# Patient Record
Sex: FEMALE | Race: WHITE | Employment: OTHER | URBAN - METROPOLITAN AREA
[De-identification: names, ages, dates, MRNs, and addresses within clinical notes are randomized per-mention and may not be internally consistent; named-entity substitution may affect disease eponyms.]

---

## 2018-03-05 PROBLEM — R42 VERTIGO: Status: ACTIVE | Noted: 2018-03-05

## 2018-03-05 PROBLEM — H91.93 BILATERAL HEARING LOSS: Status: ACTIVE | Noted: 2018-03-05

## 2018-03-05 PROBLEM — I10 ESSENTIAL HYPERTENSION: Status: ACTIVE | Noted: 2018-03-05

## 2018-03-05 PROBLEM — E11.9 TYPE 2 DIABETES MELLITUS WITHOUT COMPLICATION, WITHOUT LONG-TERM CURRENT USE OF INSULIN (HCC): Status: ACTIVE | Noted: 2018-03-05

## 2018-04-23 ENCOUNTER — HOSPITAL ENCOUNTER (OUTPATIENT)
Dept: CT IMAGING | Age: 83
Discharge: HOME | End: 2018-04-23
Attending: INTERNAL MEDICINE
Payer: MEDICARE

## 2018-04-23 DIAGNOSIS — G44.89 OTHER HEADACHE SYNDROME: ICD-10-CM

## 2018-04-23 DIAGNOSIS — Z91.81 HISTORY OF FALL: ICD-10-CM

## 2018-04-23 DIAGNOSIS — I10 ESSENTIAL HYPERTENSION: ICD-10-CM

## 2018-04-23 PROCEDURE — 70450 CT HEAD/BRAIN W/O DYE: CPT

## 2018-07-09 ENCOUNTER — HOSPITAL ENCOUNTER (OUTPATIENT)
Dept: PHYSICAL THERAPY | Age: 83
Discharge: HOME | End: 2018-07-09
Attending: INTERNAL MEDICINE
Payer: MEDICARE

## 2018-07-09 DIAGNOSIS — R42 VERTIGO: ICD-10-CM

## 2018-07-09 PROCEDURE — 97163 PT EVAL HIGH COMPLEX 45 MIN: CPT

## 2018-07-09 PROCEDURE — G8979 MOBILITY GOAL STATUS: HCPCS

## 2018-07-09 PROCEDURE — 97112 NEUROMUSCULAR REEDUCATION: CPT

## 2018-07-09 PROCEDURE — G8978 MOBILITY CURRENT STATUS: HCPCS

## 2018-07-09 NOTE — THERAPY EVALUATION
Chace Zhao  : 1935  [place]  Secondary: [place] [employer] [address] at [place] [id number] [facility], [address] [place] [address],[address], [facility] [address].  [phone]   [phone]        OUTPATIENT PHYSICAL THERAPY:Initial Assessment 2018   ICD-10: Treatment Diagnosis: Difficulty in walking, not elsewhere classified (R26.2)  Precautions/Allergies:   Review of patient's allergies indicates no known allergies. Fall Risk Score: 2 (? 5 = High Risk)  MD Orders: Evaluate and treat  MEDICAL/REFERRING DIAGNOSIS:  Vertigo [R42]   DATE OF ONSET: May 2017   REFERRING PHYSICIAN: Maxwell Eubanks MD  RETURN PHYSICIAN APPOINTMENT: Unknown      INITIAL ASSESSMENT:  Ms. Bell presents with vertigo, imbalance, and difficulty walking. Patient is at higher fall risk based on scores received on Loyd Balance Scale and Dynamic Gait Index. Unable to determine exact origins of vertigo. Patient is a poor historian. Patient could be using the term vertigo to describe her imbalance. Will continue to assess as treatment progresses. Patient would benefit from skilled physical therapy to address problems and goals. Thank you for this referral.      PROBLEM LIST (Impacting functional limitations):  1. Decreased ADL/Functional Activities  2. Decreased Ambulation Ability/Technique  3. Decreased Balance  4. Decreased Activity Tolerance  5. Decreased Cognition  6. Increased vertigo INTERVENTIONS PLANNED:  1. Balance Exercise  2. Gait Training  3. Home Exercise Program (HEP)  4. Therapeutic Activites  5. Habituation exercises   TREATMENT PLAN:  Effective Dates: 2018 TO 10/7/2018 (90 days). Frequency/Duration: 1 time a week for 90 Days  GOALS: (Goals have been discussed and agreed upon with patient.)  Short-Term Functional Goals: Time Frame: 30 days   1. Patient will be independent with home exercise program to improve balance and decrease fall risk with daily activities. 2. Patient will increase score on Loyd Balance Scale to greater than or equal to 33/56 demonstrating improved balance and decreased fall risk with daily activities. Discharge Goals: Time Frame: 90 days   1. Patient will increase score on Loyd Balance Scale to greater than or equal to 40/56 demonstrating improved balance and decreased fall risk with daily activities. 2. Patient will increase score on Dynamic Gait Index to greater than or equal to 10/24 demonstrating improved balance and decreased fall risk with daily activities. 3. Patient will report improved stability with daily activities. Rehabilitation Potential For Stated Goals: Good  Regarding Lida Zhao's therapy, I certify that the treatment plan above will be carried out by a therapist or under their direction. Thank you for this referral,  Loi Stone PT     Referring Physician Signature: Gissell Viera MD              Date                    The information in this section was collected on 7/9/2018 (except where otherwise noted). HISTORY:   History of Present Injury/Illness (Reason for Referral):  Patient reports vertigo began after she had her cochlear implants in May 2017. Patient unable to describe vertigo. Patient reports vertigo is constant. Patient reports her head feels heavy. Patient rates current dizziness as 0/10; and neck pain as 5/10. Patient uses a rollator walker for ambulation. Patient has had no falls over the past couple of months. Patient did have a fall in January 2018. Patient reports it was raining and she slipped outside and hit her head. Patient reports she cannot walk in the dark due to imbalance. Patient has a history of inner ear infections. Past Medical History/Comorbidities:   Ms. Becky Farrar  has a past medical history of Broken bones; Diabetes ([address] Ear problems; Hearing problem; History of mammogram (2015); History of Papanicolaou smear of cervix (>5 years ago); and Hypertension.   Ms. Becky Farrar has a past surgical history that includes hx orthopaedic (2008); hx colonoscopy (2008); hx cochlear implant (02/21/2017); and hx tubal ligation. Social History/Living Environment:     Lives with her daughter in a two story home. Patient lives on the first floor. Prior Level of Function/Work/Activity:  Patient needs family for transportation. Retired. Dominant Side:         RIGHT  Personal Factors:          Sex:  female        Age:  80 y.o. Current Medications:       Current Outpatient Prescriptions:     amLODIPine (NORVASC) 5 mg tablet, Take 2 Tabs by mouth daily for 90 days. Take 1 tablet by mouth once in the morning and once at night., Disp: 180 Tab, Rfl: 1    lisinopril (PRINIVIL, ZESTRIL) 5 mg tablet, Take 1 Tab by mouth daily for 90 days. , Disp: 90 Tab, Rfl: 1    aspirin delayed-release 81 mg tablet, Take  by mouth daily. , Disp: , Rfl:     multivitamin (ONE A DAY) tablet, Take 1 Tab by mouth daily. , Disp: , Rfl:    Date Last Reviewed:  7/9/2018   Number of Personal Factors/Comorbidities that affect the Plan of Care: 3+: HIGH COMPLEXITY   EXAMINATION:   Observation/Orthostatic Postural Assessment: Forward head/rounded shoulders posture   Functional Mobility:         Gait/Ambulation:  Patient ambulates without assistive device demonstrating decreased gait speed with wider base of support to improve stability. Sensation:         Decreased bilateral feet. Postural Control & Balance:  · Loyd Balance Scale:  29/56.   (A score less than 45/56 indicates high risk of falls)     · Dynamic Gait Index:  6/24.   (A score less than or equal to19/24 is abnormal and predictive of falls)     · Contentful Sensory Organization Test:  Not tested.    Oculomotor Exam:  · Eye Range of Motion:  full range of motion  · Cervical Range of Motion:   full range of motion  · Spontaneous nystagmus:  NO  · Gaze holding nystagmus:  NO  · Smooth Pursuit:      []Smooth Eye Movements    []Delayed Eye Movements [x]Within Normal Limits     []Other(comment): · Voluntary Saccades:      []Smooth Eye Movements    []Delayed Eye Movements     [x]Within Normal Limits     []Other(comment):   Vestibular Ocular Reflex Testing:  · Dynamic Visual Acuity Test:  Not tested. · Head Thrust Test: Attempted to test, but patient unable to keep her eyes open for the test.  Position Tests:  · Hallpike-Crowley testing presented as  indicating that Benign Paroxysmal Positional Vertigo (BPPV) is on the . (NOT TESTED. WILL TEST AT LATER TIME)       Body Structures Involved:  1. Nerves  2. Eyes and Ears  3. Muscles Body Functions Affected:  1. Neuromusculoskeletal Activities and Participation Affected:  1. General Tasks and Demands  2. Communication  3. Interpersonal Interactions and Relationships  4. Community, Social and [place]   Number of elements (examined above) that affect the Plan of Care: 4+: HIGH COMPLEXITY   CLINICAL PRESENTATION:   Presentation: Evolving clinical presentation with unstable and unpredictable characteristics: HIGH COMPLEXITY   CLINICAL DECISION MAKING:   Outcome Measure: Tool Used: Loyd Balance Scale  Score:  Initial: 29/56 Most Recent: X/56 (Date: -- )   Interpretation of Score: Each section is scored on a 0-4 scale, 0 representing the patients inability to perform the task and 4 representing independence. The scores of each section are added together for a total score of 56. The higher the patients score, the more independent the patient is. Any score below 45 indicates increased risk for falls. Score 56 55-45 44-34 33-23 22-12 11-1 0   Modifier CH CI CJ CK CL CM CN     ?  Mobility - Walking and Moving Around:     - CURRENT STATUS: CK - 40%-59% impaired, limited or restricted    - GOAL STATUS: CJ - 20%-39% impaired, limited or restricted    - D/C STATUS:  ---------------To be determined---------------    Medical Necessity:   · Patient is expected to demonstrate progress in balance to improve safety during activities of daily living. Reason for Services/Other Comments:  · Patient continues to require skilled intervention due to higher fall risk with daily activities. Use of outcome tool(s) and clinical judgement create a POC that gives a: Difficult prediction of patient's progress: HIGH COMPLEXITY            TREATMENT:   (In addition to Assessment/Re-Assessment sessions the following treatments were rendered)  Pre-treatment Symptoms/Complaints:  Imbalance, vertigo, and difficulty walking  Pain: Initial:   Pain Intensity 1: 5  Pain Location 1: Neck  Post Session:  5     Initial Evaluation: 45 minutes   NEUROMUSCULAR RE-EDUCATION: (15 minutes):  Exercise/activities per grid below to improve balance. Required moderate visual and verbal cues to promote dynamic balance in standing. Date:  7/9/2018 Date:   Date:     Activity/Exercise Parameters Parameters Parameters   Marching standing in place X 10B     Single side steps X 10B     Feet together Eyes open     Feet apart Eyes closed                       [employer] Portal  Treatment/Session Assessment:    · Response to Treatment:  Tolerated without complaints. · Compliance with Program/Exercises: Will assess as treatment progresses. · Recommendations/Intent for next treatment session: \"Next visit will focus on advancements to more challenging activities\". Will check for BPPV.    Total Treatment Duration:  PT Patient Time In/Time Out  Time In: 0900  Time Out: [address], PT

## 2018-07-09 NOTE — PROGRESS NOTES
Ambulatory/Rehab Services H2 Model Falls Risk Assessment    Risk Factor Pts. ·   Confusion/Disorientation/Impulsivity  []    4 ·   Symptomatic Depression  []   2 ·   Altered Elimination  []   1 ·   Dizziness/Vertigo  [x]   1 ·   Gender (Male)  []   1 ·   Any administered antiepileptics (anticonvulsants):  []   2 ·   Any administered benzodiazepines:  []   1 ·   Visual Impairment (specify):  []   1 ·   Portable Oxygen Use  []   1 ·   Orthostatic ? BP  []   1 ·   History of Recent Falls (within 3 mos.)  []   5     Ability to Rise from Chair (choose one) Pts. ·   Ability to rise in a single movement  []   0 ·   Pushes up, successful in one attempt  [x]   1 ·   Multiple attempts, but successful  []   3 ·   Unable to rise without assistance  []   4   Total: (5 or greater = High Risk) 2     Falls Prevention Plan:   []                Physical Limitations to Exercise (specify):   []                Mobility Assistance Device (type):   []                Exercise/Equipment Adaptation (specify):    ©2010 [facility] [address] Patent #[id number].  Federal Law prohibits the replication, distribution or use without written permission from [facility] [employer]

## 2018-07-16 ENCOUNTER — HOSPITAL ENCOUNTER (OUTPATIENT)
Dept: PHYSICAL THERAPY | Age: 83
Discharge: HOME | End: 2018-07-16
Attending: INTERNAL MEDICINE
Payer: MEDICARE

## 2018-07-16 PROCEDURE — 97112 NEUROMUSCULAR REEDUCATION: CPT

## 2018-07-16 NOTE — PROGRESS NOTES
Manuelito Zhao  : 1935  [place]  Secondary: [place] [employer] [address] at [facility]  [address] [facility], [address] [place] [address] [address], [address] [facility]  [phone]   [phone]        OUTPATIENT PHYSICAL THERAPY:Daily Note 2018   ICD-10: Treatment Diagnosis: Difficulty in walking, not elsewhere classified (R26.2)  Precautions/Allergies:   Review of patient's allergies indicates no known allergies. Fall Risk Score: 2 (? 5 = High Risk)  MD Orders: Evaluate and treat  MEDICAL/REFERRING DIAGNOSIS:  Dizziness and giddiness [R42]   DATE OF ONSET: May 2017   REFERRING PHYSICIAN: Nita Doyle MD  RETURN PHYSICIAN APPOINTMENT: Unknown      INITIAL ASSESSMENT:  Ms. Jake Black presents with vertigo, imbalance, and difficulty walking. Patient is at higher fall risk based on scores received on Loyd Balance Scale and Dynamic Gait Index. Unable to determine exact origins of vertigo. Patient is a poor historian. Patient could be using the term vertigo to describe her imbalance. Will continue to assess as treatment progresses. Patient would benefit from skilled physical therapy to address problems and goals. Thank you for this referral.      PROBLEM LIST (Impacting functional limitations):  1. Decreased ADL/Functional Activities  2. Decreased Ambulation Ability/Technique  3. Decreased Balance  4. Decreased Activity Tolerance  5. Decreased Cognition  6. Increased vertigo INTERVENTIONS PLANNED:  1. Balance Exercise  2. Gait Training  3. Home Exercise Program (HEP)  4. Therapeutic Activites  5. Habituation exercises   TREATMENT PLAN:  Effective Dates: 2018 TO 10/7/2018 (90 days). Frequency/Duration: 1 time a week for 90 Days  GOALS: (Goals have been discussed and agreed upon with patient.)  Short-Term Functional Goals: Time Frame: 30 days   1.  Patient will be independent with home exercise program to improve balance and decrease fall risk with daily activities. 2. Patient will increase score on Loyd Balance Scale to greater than or equal to 33/56 demonstrating improved balance and decreased fall risk with daily activities. Discharge Goals: Time Frame: 90 days   1. Patient will increase score on Loyd Balance Scale to greater than or equal to 40/56 demonstrating improved balance and decreased fall risk with daily activities. 2. Patient will increase score on Dynamic Gait Index to greater than or equal to 10/24 demonstrating improved balance and decreased fall risk with daily activities. 3. Patient will report improved stability with daily activities. Rehabilitation Potential For Stated Goals: Good  Regarding Ingrid Zhao's therapy, I certify that the treatment plan above will be carried out by a therapist or under their direction. Thank you for this referral,  Arthur Mendez PT     Referring Physician Signature: Cleveland Keane MD              Date                    The information in this section was collected on 7/9/2018 (except where otherwise noted). HISTORY:   History of Present Injury/Illness (Reason for Referral):  Patient reports vertigo began after she had her cochlear implants in May 2017. Patient unable to describe vertigo. Patient reports vertigo is constant. Patient reports her head feels heavy. Patient rates current dizziness as 0/10; and neck pain as 5/10. Patient uses a rollator walker for ambulation. Patient has had no falls over the past couple of months. Patient did have a fall in January 2018. Patient reports it was raining and she slipped outside and hit her head. Patient reports she cannot walk in the dark due to imbalance. Patient has a history of inner ear infections. Past Medical History/Comorbidities:   Ms. Constantin Clay  has a past medical history of Broken bones; Diabetes ([address] Ear problems; Hearing problem; History of mammogram (2015); History of Papanicolaou smear of cervix (>5 years ago); and Hypertension. Ms. Little Verdugo  has a past surgical history that includes hx orthopaedic (2008); hx colonoscopy (2008); hx cochlear implant (02/21/2017); and hx tubal ligation. Social History/Living Environment:     Lives with her daughter in a two story home. Patient lives on the first floor. Prior Level of Function/Work/Activity:  Patient needs family for transportation. Retired. Dominant Side:         RIGHT  Personal Factors:          Sex:  female        Age:  80 y.o. Current Medications:       Current Outpatient Prescriptions:     amLODIPine (NORVASC) 5 mg tablet, Take 2 Tabs by mouth daily for 90 days. Take 1 tablet by mouth once in the morning and once at night., Disp: 180 Tab, Rfl: 1    lisinopril (PRINIVIL, ZESTRIL) 5 mg tablet, Take 1 Tab by mouth daily for 90 days. , Disp: 90 Tab, Rfl: 1    aspirin delayed-release 81 mg tablet, Take  by mouth daily. , Disp: , Rfl:     multivitamin (ONE A DAY) tablet, Take 1 Tab by mouth daily. , Disp: , Rfl:    Date Last Reviewed:  7/16/2018   Number of Personal Factors/Comorbidities that affect the Plan of Care: 3+: HIGH COMPLEXITY   EXAMINATION:   Observation/Orthostatic Postural Assessment: Forward head/rounded shoulders posture   Functional Mobility:         Gait/Ambulation:  Patient ambulates without assistive device demonstrating decreased gait speed with wider base of support to improve stability. Sensation:         Decreased bilateral feet. Postural Control & Balance:  · Loyd Balance Scale:  29/56.   (A score less than 45/56 indicates high risk of falls)     · Dynamic Gait Index:  6/24.   (A score less than or equal to19/24 is abnormal and predictive of falls)     · [employer]pt Sensory Organization Test:  Not tested.    Oculomotor Exam:  · Eye Range of Motion:  full range of motion  · Cervical Range of Motion:   full range of motion  · Spontaneous nystagmus:  NO  · Gaze holding nystagmus:  NO  · Smooth Pursuit:      []Smooth Eye Movements    []Delayed Eye Movements     [x]Within Normal Limits     []Other(comment): · Voluntary Saccades:      []Smooth Eye Movements    []Delayed Eye Movements     [x]Within Normal Limits     []Other(comment):   Vestibular Ocular Reflex Testing:  · Dynamic Visual Acuity Test:  Not tested. · Head Thrust Test: Attempted to test, but patient unable to keep her eyes open for the test.  Position Tests:  · Hallpike-Trail testing presented as  indicating that Benign Paroxysmal Positional Vertigo (BPPV) is on the . (NOT TESTED. WILL TEST AT LATER TIME)       Body Structures Involved:  1. Nerves  2. Eyes and Ears  3. Muscles Body Functions Affected:  1. Neuromusculoskeletal Activities and Participation Affected:  1. General Tasks and Demands  2. Communication  3. Interpersonal Interactions and Relationships  4. Community, Social and [place]   Number of elements (examined above) that affect the Plan of Care: 4+: HIGH COMPLEXITY   CLINICAL PRESENTATION:   Presentation: Evolving clinical presentation with unstable and unpredictable characteristics: HIGH COMPLEXITY   CLINICAL DECISION MAKING:   Outcome Measure: Tool Used: Loyd Balance Scale  Score:  Initial: 29/56 Most Recent: X/56 (Date: -- )   Interpretation of Score: Each section is scored on a 0-4 scale, 0 representing the patients inability to perform the task and 4 representing independence. The scores of each section are added together for a total score of 56. The higher the patients score, the more independent the patient is. Any score below 45 indicates increased risk for falls. Score 56 55-45 44-34 33-23 22-12 11-1 0   Modifier CH CI CJ CK CL CM CN     ?  Mobility - Walking and Moving Around:     - CURRENT STATUS: CK - 40%-59% impaired, limited or restricted    - GOAL STATUS: CJ - 20%-39% impaired, limited or restricted    - D/C STATUS:  ---------------To be determined---------------    Medical Necessity:   · Patient is expected to demonstrate progress in balance to improve safety during activities of daily living. Reason for Services/Other Comments:  · Patient continues to require skilled intervention due to higher fall risk with daily activities. Use of outcome tool(s) and clinical judgement create a POC that gives a: Difficult prediction of patient's progress: HIGH COMPLEXITY            TREATMENT:   (In addition to Assessment/Re-Assessment sessions the following treatments were rendered)  Pre-treatment Symptoms/Complaints: \"Always dizzy. The dizziness does not change. No falls\". Pain: Initial:   Pain Intensity 1: 0  Post Session:  0     NEUROMUSCULAR RE-EDUCATION: (45 minutes):  Exercise/activities per grid below to improve balance. Required moderate visual and verbal cues to promote dynamic balance in standing. Balance/Vestibular Treatment:   Activity   Date  7/16/18 Date Date Date Date Date   Activity/Exercise   Sets/reps/equipment Sets/reps/  equipment Sets/reps/  equipment Sets/reps/  equipment Sets/reps/  equipment Sets/reps/  equipment   Walking with head turns     4 laps        Walking with head up & down     4 laps        Step ups     6 inch  2x10        Step taps     6 inch  2x10B        Marching   4 laps        Sidestepping           Crossovers           [place]           Walking  backwards     2 laps        Tandem walking           Weaving in/out of cones     4 laps        Picking up cones             Stepping over half foam     2x10        Ball toss           Kick ball           Figure 8s            Circles right/left           Walking with 360 degree turns           Spirals           Weight shifting:    Left & Right             Weight shifting:   Forward & Backward              Static Standing Balance             Standing with feet apart             Standing with feet together             Standing with feet semitandem           Standing with feet tandem           Single leg stance           X1/X2 Viewing exercises             Hallpike-Quentin testing for BPPV (Benign Paroxysmal Positional Vertigo)     Negative bilateral        Hassan-Daroff exercises           Canalith Repositioning treatment/Epley Maneuver  for BPPV (Benign Paroxysmal Positional Vertigo)           Smart Equitest Training: See scanned report. [place]  Treatment/Session Assessment:    · Response to Treatment:  No change in dizziness during treatment. Patient negative for BPPV. Continue to progress to tolerance. · Compliance with Program/Exercises: Will assess as treatment progresses. · Recommendations/Intent for next treatment session: \"Next visit will focus on advancements to more challenging activities\".    Total Treatment Duration:  PT Patient Time In/Time Out  Time In: 0930  Time Out: [address], PT

## 2018-07-23 ENCOUNTER — HOSPITAL ENCOUNTER (OUTPATIENT)
Dept: PHYSICAL THERAPY | Age: 83
Discharge: HOME | End: 2018-07-23
Attending: INTERNAL MEDICINE
Payer: MEDICARE

## 2018-07-23 PROCEDURE — 97112 NEUROMUSCULAR REEDUCATION: CPT

## 2018-07-23 NOTE — PROGRESS NOTES
Dimitri Zhao  : 1935  [place]  Secondary: [place] [employer] [address] at [facility]  [address] [facility], [address] [place] [address],[address], [address] [facility]  [phone]   [phone]        OUTPATIENT PHYSICAL THERAPY:Daily Note 2018   ICD-10: Treatment Diagnosis: Difficulty in walking, not elsewhere classified (R26.2)  Precautions/Allergies:   Review of patient's allergies indicates no known allergies. Fall Risk Score: 2 (? 5 = High Risk)  MD Orders: Evaluate and treat  MEDICAL/REFERRING DIAGNOSIS:  Dizziness and giddiness [R42]   DATE OF ONSET: May 2017   REFERRING PHYSICIAN: Angle Del Real MD  RETURN PHYSICIAN APPOINTMENT: Unknown      INITIAL ASSESSMENT:  Ms. Parks Severance presents with vertigo, imbalance, and difficulty walking. Patient is at higher fall risk based on scores received on Loyd Balance Scale and Dynamic Gait Index. Unable to determine exact origins of vertigo. Patient is a poor historian. Patient could be using the term vertigo to describe her imbalance. Will continue to assess as treatment progresses. Patient would benefit from skilled physical therapy to address problems and goals. Thank you for this referral.      PROBLEM LIST (Impacting functional limitations):  1. Decreased ADL/Functional Activities  2. Decreased Ambulation Ability/Technique  3. Decreased Balance  4. Decreased Activity Tolerance  5. Decreased Cognition  6. Increased vertigo INTERVENTIONS PLANNED:  1. Balance Exercise  2. Gait Training  3. Home Exercise Program (HEP)  4. Therapeutic Activites  5. Habituation exercises   TREATMENT PLAN:  Effective Dates: 2018 TO 10/7/2018 (90 days). Frequency/Duration: 1 time a week for 90 Days  GOALS: (Goals have been discussed and agreed upon with patient.)  Short-Term Functional Goals: Time Frame: 30 days   1.  Patient will be independent with home exercise program to improve balance and decrease fall risk with daily activities. 2. Patient will increase score on Loyd Balance Scale to greater than or equal to 33/56 demonstrating improved balance and decreased fall risk with daily activities. Discharge Goals: Time Frame: 90 days   1. Patient will increase score on Loyd Balance Scale to greater than or equal to 40/56 demonstrating improved balance and decreased fall risk with daily activities. 2. Patient will increase score on Dynamic Gait Index to greater than or equal to 10/24 demonstrating improved balance and decreased fall risk with daily activities. 3. Patient will report improved stability with daily activities. Rehabilitation Potential For Stated Goals: Good  Regarding Samy Zhao's therapy, I certify that the treatment plan above will be carried out by a therapist or under their direction. Thank you for this referral,  Angus Alford PT     Referring Physician Signature: Demetrius Caballero MD              Date                    The information in this section was collected on 7/9/2018 (except where otherwise noted). HISTORY:   History of Present Injury/Illness (Reason for Referral):  Patient reports vertigo began after she had her cochlear implants in May 2017. Patient unable to describe vertigo. Patient reports vertigo is constant. Patient reports her head feels heavy. Patient rates current dizziness as 0/10; and neck pain as 5/10. Patient uses a rollator walker for ambulation. Patient has had no falls over the past couple of months. Patient did have a fall in January 2018. Patient reports it was raining and she slipped outside and hit her head. Patient reports she cannot walk in the dark due to imbalance. Patient has a history of inner ear infections. Past Medical History/Comorbidities:   Ms. Dominick Valdes  has a past medical history of Broken bones; Diabetes ([address] Ear problems; Hearing problem; History of mammogram (2015); History of Papanicolaou smear of cervix (>5 years ago); and Hypertension. Ms. Jayson Yañez  has a past surgical history that includes hx orthopaedic (2008); hx colonoscopy (2008); hx cochlear implant (02/21/2017); and hx tubal ligation. Social History/Living Environment:     Lives with her daughter in a two story home. Patient lives on the first floor. Prior Level of Function/Work/Activity:  Patient needs family for transportation. Retired. Dominant Side:         RIGHT  Personal Factors:          Sex:  female        Age:  80 y.o. Current Medications:       Current Outpatient Prescriptions:     amLODIPine (NORVASC) 5 mg tablet, Take 2 Tabs by mouth daily for 90 days. Take 1 tablet by mouth once in the morning and once at night., Disp: 180 Tab, Rfl: 1    lisinopril (PRINIVIL, ZESTRIL) 5 mg tablet, Take 1 Tab by mouth daily for 90 days. , Disp: 90 Tab, Rfl: 1    aspirin delayed-release 81 mg tablet, Take  by mouth daily. , Disp: , Rfl:     multivitamin (ONE A DAY) tablet, Take 1 Tab by mouth daily. , Disp: , Rfl:    Date Last Reviewed:  7/23/2018   Number of Personal Factors/Comorbidities that affect the Plan of Care: 3+: HIGH COMPLEXITY   EXAMINATION:   Observation/Orthostatic Postural Assessment: Forward head/rounded shoulders posture   Functional Mobility:         Gait/Ambulation:  Patient ambulates without assistive device demonstrating decreased gait speed with wider base of support to improve stability. Sensation:         Decreased bilateral feet. Postural Control & Balance:  · Loyd Balance Scale:  29/56.   (A score less than 45/56 indicates high risk of falls)     · Dynamic Gait Index:  6/24.   (A score less than or equal to19/24 is abnormal and predictive of falls)     · [facility] Sensory Organization Test:  Not tested.    Oculomotor Exam:  · Eye Range of Motion:  full range of motion  · Cervical Range of Motion:   full range of motion  · Spontaneous nystagmus:  NO  · Gaze holding nystagmus:  NO  · Smooth Pursuit:      []Smooth Eye Movements    []Delayed Eye Movements     [x]Within Normal Limits     []Other(comment): · Voluntary Saccades:      []Smooth Eye Movements    []Delayed Eye Movements     [x]Within Normal Limits     []Other(comment):   Vestibular Ocular Reflex Testing:  · Dynamic Visual Acuity Test:  Not tested. · Head Thrust Test: Attempted to test, but patient unable to keep her eyes open for the test.  Position Tests:  · Hallpike-San[place] testing presented as  indicating that Benign Paroxysmal Positional Vertigo (BPPV) is on the . (NOT TESTED. WILL TEST AT LATER TIME)       Body Structures Involved:  1. Nerves  2. Eyes and Ears  3. Muscles Body Functions Affected:  1. Neuromusculoskeletal Activities and Participation Affected:  1. General Tasks and Demands  2. Communication  3. Interpersonal Interactions and Relationships  4. Community, Social and [place]   Number of elements (examined above) that affect the Plan of Care: 4+: HIGH COMPLEXITY   CLINICAL PRESENTATION:   Presentation: Evolving clinical presentation with unstable and unpredictable characteristics: HIGH COMPLEXITY   CLINICAL DECISION MAKING:   Outcome Measure: Tool Used: Loyd Balance Scale  Score:  Initial: 29/56 Most Recent: X/56 (Date: -- )   Interpretation of Score: Each section is scored on a 0-4 scale, 0 representing the patients inability to perform the task and 4 representing independence. The scores of each section are added together for a total score of 56. The higher the patients score, the more independent the patient is. Any score below 45 indicates increased risk for falls. Score 56 55-45 44-34 33-23 22-12 11-1 0   Modifier CH CI CJ CK CL CM CN     ?  Mobility - Walking and Moving Around:     - CURRENT STATUS: CK - 40%-59% impaired, limited or restricted    - GOAL STATUS: CJ - 20%-39% impaired, limited or restricted    - D/C STATUS:  ---------------To be determined---------------    Medical Necessity:   · Patient is expected to demonstrate progress in balance to improve safety during activities of daily living. Reason for Services/Other Comments:  · Patient continues to require skilled intervention due to higher fall risk with daily activities. Use of outcome tool(s) and clinical judgement create a POC that gives a: Difficult prediction of patient's progress: HIGH COMPLEXITY            TREATMENT:   (In addition to Assessment/Re-Assessment sessions the following treatments were rendered)  Pre-treatment Symptoms/Complaints:  \"Doing okay. No falls\". Pain: Initial:   Pain Intensity 1: 0  Post Session:  0     NEUROMUSCULAR RE-EDUCATION: (45 minutes):  Exercise/activities per grid below to improve balance. Required moderate visual and verbal cues to promote dynamic balance in standing. Balance/Vestibular Treatment:   Activity   Date  7/16/18 Date  7/23/18 Date Date Date Date   Activity/Exercise   Sets/reps/equipment Sets/reps/  equipment Sets/reps/  equipment Sets/reps/  equipment Sets/reps/  equipment Sets/reps/  equipment   Walking with head turns     4 laps 4 laps       Walking with head up & down     4 laps 4 laps       Step ups     6 inch  2x10 6 inch  2x10       Step taps     6 inch  2x10B 6 inch  2x10B       Marching   4 laps 4 laps       Sidestepping    4 laps       Crossovers           [place]           Walking  backwards     2 laps 2 laps       Tandem walking           Weaving in/out of cones     4 laps 4 laps       Picking up cones             Stepping over half foam     2x10 2x10       Ball toss           Kick ball           Figure 8s            Circles right/left           Walking with 360 degree turns           Spirals           Weight shifting:    Left & Right             Weight shifting:   Forward & Backward              Static Standing Balance      Sandduneyes open/ eyes closed       Standing with feet apart             Standing with feet together             Standing with feet semitandem           Standing with feet tandem           Single leg stance           X1/X2 Viewing exercises             Hallpike-Quentin testing for BPPV (Benign Paroxysmal Positional Vertigo)     Negative bilateral        Hasasn-Daroff exercises           Canalith Repositioning treatment/Epley Maneuver  for BPPV (Benign Paroxysmal Positional Vertigo)           Smart [employer] Training: See scanned report. [employer] Portal  Treatment/Session Assessment:    · Response to Treatment:  Patient needed several rest breaks due to shortness of breath. Patient demonstrates imbalance walking with head turns. Continue to progress to tolerance. · Compliance with Program/Exercises: Will assess as treatment progresses. · Recommendations/Intent for next treatment session: \"Next visit will focus on advancements to more challenging activities\".    Total Treatment Duration:  PT Patient Time In/Time Out  Time In: 0940  Time Out: [address] , PT

## 2018-07-30 ENCOUNTER — HOSPITAL ENCOUNTER (OUTPATIENT)
Dept: PHYSICAL THERAPY | Age: 83
Discharge: HOME | End: 2018-07-30
Attending: INTERNAL MEDICINE
Payer: MEDICARE

## 2018-07-30 PROCEDURE — G8980 MOBILITY D/C STATUS: HCPCS

## 2018-07-30 PROCEDURE — G8979 MOBILITY GOAL STATUS: HCPCS

## 2018-07-30 PROCEDURE — 97112 NEUROMUSCULAR REEDUCATION: CPT

## 2018-07-30 NOTE — THERAPY DISCHARGE
Devi Zhao  : 1935  [place]  Secondary: [place] [employer] [address] at [facility]  27[address] [facility], [address] [place] [address],[address], [address] [facility]  [phone]   [phone]        OUTPATIENT PHYSICAL [address] Note and Discharge 2018   ICD-10: Treatment Diagnosis: Difficulty in walking, not elsewhere classified (R26.2)  Precautions/Allergies:   Review of patient's allergies indicates no known allergies. Fall Risk Score: 2 (? 5 = High Risk)  MD Orders: Evaluate and treat  MEDICAL/REFERRING DIAGNOSIS:  Dizziness and giddiness [R42]   DATE OF ONSET: May 2017   REFERRING PHYSICIAN: Linda Faust MD  RETURN PHYSICIAN APPOINTMENT: Unknown      INITIAL ASSESSMENT:  Ms. Deshaun Hendrickson presents with vertigo, imbalance, and difficulty walking. Patient is at higher fall risk based on scores received on Loyd Balance Scale and Dynamic Gait Index. Unable to determine exact origins of vertigo. Patient is a poor historian. Patient could be using the term vertigo to describe her imbalance. Will continue to assess as treatment progresses. Patient would benefit from skilled physical therapy to address problems and goals. Thank you for this referral.     Discharge note on 2018:  Patient has attended four scheduled physical therapy appointments from 2018 to 2018. Patient reports vertigo and balance seems to have remained the same since beginning therapy. Patient discharged from physical therapy due to her wishes. Thank you for this referral.    PROBLEM LIST (Impacting functional limitations):  1. Decreased ADL/Functional Activities  2. Decreased Ambulation Ability/Technique  3. Decreased Balance  4. Decreased Activity Tolerance  5. Decreased Cognition  6. Increased vertigo INTERVENTIONS PLANNED:  1. Balance Exercise  2. Gait Training  3. Home Exercise Program (HEP)  4. Therapeutic Activites  5.  Habituation exercises   TREATMENT PLAN:  Effective Dates: 7/9/2018 TO 10/7/2018 (90 days). Frequency/Duration: 1 time a week for 90 Days. Patient discharged from physical therapy due to her wishes. GOALS: (Goals have been discussed and agreed upon with patient.)  Short-Term Functional Goals: Time Frame: 30 days   1. Patient will be independent with home exercise program to improve balance and decrease fall risk with daily activities. Goal met. 2. Patient will increase score on Loyd Balance Scale to greater than or equal to 33/56 demonstrating improved balance and decreased fall risk with daily activities. Goal met. Discharge Goals: Time Frame: 90 days   1. Patient will increase score on Loyd Balance Scale to greater than or equal to 40/56 demonstrating improved balance and decreased fall risk with daily activities. Goal not met. 2. Patient will increase score on Dynamic Gait Index to greater than or equal to 10/24 demonstrating improved balance and decreased fall risk with daily activities. Goal not met. 3. Patient will report improved stability with daily activities. Goal not met. Rehabilitation Potential For Stated Goals: Good              The information in this section was collected on 7/9/2018 (except where otherwise noted). HISTORY:   History of Present Injury/Illness (Reason for Referral):  Patient reports vertigo began after she had her cochlear implants in May 2017. Patient unable to describe vertigo. Patient reports vertigo is constant. Patient reports her head feels heavy. Patient rates current dizziness as 0/10; and neck pain as 5/10. Patient uses a rollator walker for ambulation. Patient has had no falls over the past couple of months. Patient did have a fall in January 2018. Patient reports it was raining and she slipped outside and hit her head. Patient reports she cannot walk in the dark due to imbalance. Patient has a history of inner ear infections.     Past Medical History/Comorbidities:   Ms. Ani Oliver has a past medical history of Broken bones; Diabetes ([address] Ear problems; Hearing problem; History of mammogram (2015); History of Papanicolaou smear of cervix (>5 years ago); and Hypertension. Ms. Murphy   has a past surgical history that includes hx orthopaedic (2008); hx colonoscopy (2008); hx cochlear implant (02/21/2017); and hx tubal ligation. Social History/Living Environment:     Lives with her daughter in a two story home. Patient lives on the first floor. Prior Level of Function/Work/Activity:  Patient needs family for transportation. Retired. Dominant Side:         RIGHT  Personal Factors:          Sex:  female        Age:  80 y.o. Current Medications:       Current Outpatient Prescriptions:     amLODIPine (NORVASC) 5 mg tablet, Take 2 Tabs by mouth daily for 90 days. Take 1 tablet by mouth once in the morning and once at night., Disp: 180 Tab, Rfl: 1    lisinopril (PRINIVIL, ZESTRIL) 5 mg tablet, Take 1 Tab by mouth daily for 90 days. , Disp: 90 Tab, Rfl: 1    aspirin delayed-release 81 mg tablet, Take  by mouth daily. , Disp: , Rfl:     multivitamin (ONE A DAY) tablet, Take 1 Tab by mouth daily. , Disp: , Rfl:    Date Last Reviewed:  7/30/2018   Number of Personal Factors/Comorbidities that affect the Plan of Care: 3+: HIGH COMPLEXITY   EXAMINATION:   Observation/Orthostatic Postural Assessment: Forward head/rounded shoulders posture   Functional Mobility:         Gait/Ambulation:  Patient ambulates without assistive device demonstrating decreased gait speed with wider base of support to improve stability. Sensation:         Decreased bilateral feet. Postural Control & Balance:  · Loyd Balance Scale:  34/56.   (A score less than 45/56 indicates high risk of falls)   Score improved from 29/56 on initial evaluation. · Dynamic Gait Index:  6/24.   (A score less than or equal to19/24 is abnormal and predictive of falls)     · [place] Sensory Organization Test:  Not tested. Oculomotor Exam:  · Eye Range of Motion:  full range of motion  · Cervical Range of Motion:   full range of motion  · Spontaneous nystagmus:  NO  · Gaze holding nystagmus:  NO  · Smooth Pursuit:      []Smooth Eye Movements    []Delayed Eye Movements     [x]Within Normal Limits     []Other(comment): · Voluntary Saccades:      []Smooth Eye Movements    []Delayed Eye Movements     [x]Within Normal Limits     []Other(comment):   Vestibular Ocular Reflex Testing:  · Dynamic Visual Acuity Test:  Not tested. · Head Thrust Test: Attempted to test, but patient unable to keep her eyes open for the test.  Position Tests:  · Hallpike-[place] testing presented as  indicating that Benign Paroxysmal Positional Vertigo (BPPV) is on the . Body Structures Involved:  1. Nerves  2. Eyes and Ears  3. Muscles Body Functions Affected:  1. Neuromusculoskeletal Activities and Participation Affected:  1. General Tasks and Demands  2. Communication  3. Interpersonal Interactions and Relationships  4. Community, Social and [place]   Number of elements (examined above) that affect the Plan of Care: 4+: HIGH COMPLEXITY   CLINICAL PRESENTATION:   Presentation: Evolving clinical presentation with unstable and unpredictable characteristics: HIGH COMPLEXITY   CLINICAL DECISION MAKING:   Outcome Measure: Tool Used: Loyd Balance Scale  Score:  Initial: 29/56 Most Recent: 34/56 (Date: 7- )   Interpretation of Score: Each section is scored on a 0-4 scale, 0 representing the patients inability to perform the task and 4 representing independence. The scores of each section are added together for a total score of 56. The higher the patients score, the more independent the patient is. Any score below 45 indicates increased risk for falls. Score 56 55-45 44-34 33-23 22-12 11-1 0   Modifier CH CI CJ CK CL CM CN     ?  Mobility - Walking and Moving Around:     - CURRENT STATUS: CJ - 20%-39% impaired, limited or restricted    - GOAL STATUS: CJ - 20%-39% impaired, limited or restricted    - D/C STATUS:  CJ - 20%-39% impaired, limited or restricted       Use of outcome tool(s) and clinical judgement create a POC that gives a: Difficult prediction of patient's progress: HIGH COMPLEXITY            TREATMENT:   (In addition to Assessment/Re-Assessment sessions the following treatments were rendered)  Pre-treatment Symptoms/Complaints: \"No complaints. No falls\". Pain: Initial:   Pain Intensity 1: 0  Post Session:  0     NEUROMUSCULAR RE-EDUCATION: (45 minutes):  Exercise/activities per grid below to improve balance. Required moderate visual and verbal cues to promote dynamic balance in standing. Balance/Vestibular Treatment:   Activity   Date  7/16/18 Date  7/23/18 Date  7/30/18 Date Date Date   Activity/Exercise   Sets/reps/equipment Sets/reps/  equipment Sets/reps/  equipment Sets/reps/  equipment Sets/reps/  equipment Sets/reps/  equipment   Walking with head turns     4 laps 4 laps 4 laps      Walking with head up & down     4 laps 4 laps 4 laps      Step ups     6 inch  2x10 6 inch  2x10 6 inch  2x10      Step taps     6 inch  2x10B 6 inch  2x10B 6 inch  2x10B      Marching   4 laps 4 laps 4 laps      Sidestepping    4 laps 4 laps      Crossovers           [place]           Walking  backwards     2 laps 2 laps 4 laps      Tandem walking           Weaving in/out of cones     4 laps 4 laps 4 laps      Picking up cones             Stepping over half foam     2x10 2x10 2x10      Ball toss           Kick ball           Figure 8s            Circles right/left           Walking with 360 degree turns           Spirals           Weight shifting:    Left & Right       Tiltboard      Weight shifting:   Forward & Backward        Tiltboard      Static Standing Balance      Sanddune eyes open/ eyes closed [place] eyes open/ eyes closed      Standing with feet apart             Standing with feet together Standing with feet semitandem           Standing with feet tandem           Single leg stance           X1/X2 Viewing exercises             Hallpike-Quentin testing for BPPV (Benign Paroxysmal Positional Vertigo)     Negative bilateral        Hassan-Daroff exercises           Canalith Repositioning treatment/Epley Maneuver  for BPPV (Benign Paroxysmal Positional Vertigo)           Smart [employer] Training: See scanned report. [employer] Portal  Treatment/Session Assessment:    · Response to Treatment:  Patient tolerated exercises without complaints. · Compliance with Program/Exercises: Questionable. · Recommendations/Intent for next treatment session: Patient discharged from physical therapy.    Total Treatment Duration:  PT Patient Time In/Time Out  Time In: 1030  Time Out: 1400 [facility],

## 2020-09-21 PROBLEM — J30.1 SEASONAL ALLERGIC RHINITIS DUE TO POLLEN: Status: ACTIVE | Noted: 2020-09-21

## 2020-11-02 PROBLEM — R41.3 MEMORY DIFFICULTIES: Status: ACTIVE | Noted: 2020-11-02

## 2020-12-07 PROBLEM — F41.9 ANXIETY: Status: ACTIVE | Noted: 2020-12-07

## 2021-03-29 ENCOUNTER — HOSPITAL ENCOUNTER (OUTPATIENT)
Dept: PHYSICAL THERAPY | Age: 86
Discharge: HOME | End: 2021-03-29
Payer: MEDICARE

## 2021-03-29 DIAGNOSIS — G89.29 CHRONIC RIGHT SHOULDER PAIN: ICD-10-CM

## 2021-03-29 DIAGNOSIS — S42.201S CLOSED FRACTURE OF PROXIMAL END OF RIGHT HUMERUS, UNSPECIFIED FRACTURE MORPHOLOGY, SEQUELA: ICD-10-CM

## 2021-03-29 DIAGNOSIS — M25.511 CHRONIC RIGHT SHOULDER PAIN: ICD-10-CM

## 2021-03-29 PROCEDURE — 97161 PT EVAL LOW COMPLEX 20 MIN: CPT

## 2021-03-29 NOTE — THERAPY EVALUATION
Ama Ribeiro Brace  : 1935  Payor: Jacquie Mauro / Plan: [address] HMO / Product Type: Managed Care Medicare /  [facility] [place]  at [facility] Therapy  [address], [facility], [address]  [phone]   [phone]         OUTPATIENT PHYSICAL THERAPY:Initial Assessment 3/29/2021   ICD-10: Treatment Diagnosis: Chronic right shoulder pain [M25.511, G89.29]  Closed fracture of proximal end of right humerus, unspecified fracture morphology, sequela [S42.201S]  Precautions/Allergies:   Aricept [donepezil]   TREATMENT PLAN:  Effective Dates: 3/29/2021 TO 2021 (90 days). Frequency/Duration: 1 time a week for 90 Day(s) and upon reassessment, will adjust frequency and duration as progress indicates. MEDICAL/REFERRING DIAGNOSIS:  Chronic right shoulder pain [M25.511, G89.29]  Closed fracture of proximal end of right humerus, unspecified fracture morphology, sequela [S42.201S]   DATE OF ONSET: fall/ztpoctif55/12/2020  REFERRING PHYSICIAN: Frida Guan MD MD Orders: Eval and treat  Return MD Appointment:  As needed     INITIAL ASSESSMENT:  Ms. Lisseth Wills presents with decreased ROM and strength in R UE due to fall leading to proximal humerus fracture. She is accompanied to therapy by her daughter who is her primary caregiver. Pt has dementia and has increased difficulty remembering things. She can not recall her fall. She has mild pain with available endrange of motion. She will benefit from therapy to address her ROM and strength losses in her dominant  UE. PROBLEM LIST (Impacting functional limitations):  1. Decreased Strength  2. Decreased ADL/Functional Activities  3. Increased Pain  4. Decreased Activity Tolerance INTERVENTIONS PLANNED: (Treatment may consist of any combination of the following)  1. Home Exercise Program (HEP)  2. Manual Therapy  3. Range of Motion (ROM)  4.  Therapeutic Exercise/Strengthening     GOALS: (Goals have been discussed and agreed upon with patient.)  Short-Term Functional Goals: Time Frame: 6 weeks  1. Establish independent HEP with no cuing. 2. Pt will be able to gain 5-10 degrees of shoulder motion for overhead reaching. 3. Pt will be able to increase strength by 1/2 grade for lifting purposes and sustained overhead tasks like with bathing and grooming. Discharge Goals: Time Frame: 12 weeks (90 days)  1. Pt will be able to improve score on Hitchcock index by at least 5 points for advanced ADL's.  2. Pt will be able to perform her normal ADL's without assistance. 3. Pt will be able to lift 2-5 pound objects without dfficulty. OUTCOME MEASURE:    OUTCOME: ANDREW Shoulder Score  Initial Score: 49/93 Most Recent: X/100 (Date: XX/XX)   Interpretation of Score: 20 questions each scored on a 3 point scale with 0 representing \"extreme difficulty or unable to perform\" and 3 representing \"no difficulty\", 3 questions each scored from 0-10 about pain, and 1 question scored 0-10 about function of the shoulder  The lower the score, the greater the functional disability. 100/100 represents no disability, pain or loss of function. Minimal clinically important difference is 11.4. Minimal detectable change is 12.1. MEDICAL NECESSITY:   · Patient is expected to demonstrate progress in strength and range of motion to increase independence with ADL's.  REASON FOR SERVICES/OTHER COMMENTS:  · Patient continues to require skilled intervention due to lack of full shoulder motion and strength. Total Duration:  PT Patient Time In/Time Out  Time In: 1100  Time Out: 1145    Rehabilitation Potential For Stated Goals: Jeremiah Zhao's therapy, I certify that the treatment plan above will be carried out by a therapist or under their direction.   Thank you for this referral,  Dipti Roldan, PT     Referring Physician Signature: Patito Woodson MD _______________________________ Date _____________                        PAIN/SUBJECTIVE:   Initial: Pain Intensity 1: 3  Post Session:  3/10   HISTORY:   History of Injury/Illness (Reason for Referral):  Pt fell in her room at her daughter's house on 12/12/2020 and suffered right proximal humerus fracture. She has had increased pain in the shoulder with loss of ROM and strength. Daughter assists her at times. Pt does have dementia and can not remember the fall. She has pain at the available endrange of her motion. Past Medical History/Comorbidities:   Ms. Cesar Shin  has a past medical history of Broken bones, Diabetes ([address] Ear problems, Hearing problem, History of mammogram (2015), History of Papanicolaou smear of cervix (>5 years ago), and Hypertension. Ms. Cesar Shin  has a past surgical history that includes hx orthopaedic (2008); hx colonoscopy (2008); hx cochlear implant (02/21/2017); and hx tubal ligation. Social History/Living Environment:     pt lives with daughter and family  Prior Level of Function/Work/Activity:  Pt is not working  Dominant Side:         RIGHT   Ambulatory/Rehab Services H2 Model Falls Risk Assessment   Risk Factors:       (4)  Confusion/Disorientation/Impulsivity       (5)  History of Recent Falls [w/in 3 months] Ability to Rise from Chair:       (1)  Pushes up, successful in one attempt   Falls Prevention Plan: Mobility Assistance Device (specify):  continue use of rollator   Total: (5 or greater = High Risk): 10   ©2010 [facility] [address] [place] [address] States Patent #[id number]. Federal Law prohibits the replication, distribution or use without written permission from [facility] [employer]   Current Medications:       Current Outpatient Medications:     memantine (Namenda) 5 mg tablet, Take 1 Tab by mouth daily for 30 days. , Disp: 30 Tab, Rfl: 5    hydrOXYzine HCL (ATARAX) 50 mg tablet, Take 1 Tab by mouth three (3) times daily as needed for Itching or Anxiety for up to 10 days. , Disp: 30 Tab, Rfl: 2    triamcinolone acetonide (KENALOG) 0.1 % topical cream, Apply  to affected area two (2) times a day. use thin layer, Disp: 80 g, Rfl: 5    lancets (One Touch Delica) 33 gauge misc, One Touch Ultra 2 lancets, check fs every day, DM2, E11.9, Disp: 100 Lancet, Rfl: 1   Date Last Reviewed:  3/29/2021   Number of Personal Factors/Comorbidities that affect the Plan of Care: 1-2: MODERATE COMPLEXITY        EXAMINATION:   Palpation:          No obvious/palpable edema  ROM:        AROM:  Flex 101 °  abd 95 °  ER 30 °  IR 65 °                      L UE   Flex 129 °  abd 131 °  ER/IR -did not assess, but WFL with gross observation        Strength:     R UE flex 3/5, abd 3-/5, ER 3-/5, IR 3+/5, biceps 3/5         L UE flex 4-/5, abd 3+/5, ER 3+/5, IR 4/5, biceps 3+/5            Special Tests: na  Neurological Screen: na  Balance:  poor -uses rollator and daughter assists her up the stairs to bathe      Body Structures Involved:  1. Bones  2. Joints  3. Muscles  4. Ligaments Body Functions Affected:  1. Sensory/Pain  2. Neuromusculoskeletal  3. Movement Related Activities and Participation Affected:  1. General Tasks and Demands  2. Self Care  3. Domestic Life  4.  Interpersonal Interactions and Relationships   Number of elements (examined above) that affect the Plan of Care: 4+: HIGH COMPLEXITY   CLINICAL PRESENTATION:   Presentation: Stable and uncomplicated: LOW COMPLEXITY   CLINICAL DECISION MAKING:   Use of outcome tool(s) and clinical judgement create a POC that gives a: Clear prediction of patient's progress: LOW COMPLEXITY

## 2021-03-29 NOTE — PROGRESS NOTES
Ronen Flight Brace  : 1935  Payor: Sreekanth Yudy / Plan: [address] HMO / Product Type: Managed Care Medicare /  [facility] [place]  at [facility] Therapy  [address] [facility], [facility], [address]  [phone]   [phone]      OUTPATIENT PHYSICAL THERAPY: Daily Treatment Note 3/29/2021  Visit Count:  1    ICD-10: Treatment Diagnosis: Chronic right shoulder pain [M25.511, G89.29]  Closed fracture of proximal end of right humerus, unspecified fracture morphology, sequela [S42.201S]  Precautions/Allergies:   Aricept [donepezil]   TREATMENT PLAN:  Effective Dates: 3/29/2021 TO 2021 (90 days). Frequency/Duration: 1 time a week for 90 Day(s) MEDICAL/REFERRING DIAGNOSIS:  Chronic right shoulder pain [M25.511, G89.29]  Closed fracture of proximal end of right humerus, unspecified fracture morphology, sequela [S42.201S]   DATE OF ONSET: 2020  REFERRING PHYSICIAN: Roberta Chavira MD MD Orders: Eval and treat  Return MD Appointment: as needed     Pre-treatment Symptoms/Complaints:  Pt reports feeling okay. Pain: Initial: Pain Intensity 1: 3  Post Session:  3/10   Medications Last Reviewed:  3/29/2021  Updated Objective Findings:  See evaluation note from today   TREATMENT:   THERAPEUTIC EXERCISE: (20 minutes):  Exercises per grid below to improve mobility and strength. Required minimal verbal cues to promote proper body mechanics. Progressed resistance, range and repetitions as indicated. Pulleys x 15  Supine t-bar flexion x 15  Supine t-bar presses x 15  Biceps curls 2# x 12  Rows red TB x 12  PROM x 10  Manual Therapy (  na    ): na    Therapeutic Modalities (  na   ):na    Evaluation (x)    HEP: As above; handouts given to patient for all exercises. Treatment/Session Summary:    · Response to Treatment:  Pt having some pain with shoulder passive motion and with pulleys.   Seems okay and comfortable at rest.  Daughter instructed in HEP as well.  · Communication/Consultation:  None today  · Equipment provided today:  pulleys, red TB  · Recommendations/Intent for next treatment session: Next visit will focus on ROM, strengthening.   Total Treatment Billable Duration:  eval  PT Patient Time In/Time Out  Time In: 1100  Time Out: Rah 35, PT    Future Appointments   Date Time Provider Diane Calderon   4/5/2021 10:15 AM Prasanth Phillips PT [facility]   4/12/2021  3:15 PM Lieutenant Yanez [facility]   5/17/2021 10:20 AM MAT LAB [facility]   5/21/2021  3:00 PM Víctor Simmons MD [facility]

## 2021-04-05 ENCOUNTER — HOSPITAL ENCOUNTER (OUTPATIENT)
Dept: PHYSICAL THERAPY | Age: 86
Discharge: HOME | End: 2021-04-05
Payer: MEDICARE

## 2021-04-05 PROCEDURE — 97140 MANUAL THERAPY 1/> REGIONS: CPT

## 2021-04-05 PROCEDURE — 97110 THERAPEUTIC EXERCISES: CPT

## 2021-04-05 NOTE — PROGRESS NOTES
Randy Zhao  : 1935  Payor: Rosi Baeza / Plan: [address] HMO / Product Type: Managed Care Medicare /  [address] [place] [address]  at [place] [address] Therapy  [address], [address]  [phone]   [phone]      OUTPATIENT PHYSICAL THERAPY: Daily Treatment Note 2021  Visit Count:  2    ICD-10: Treatment Diagnosis: Chronic right shoulder pain [M25.511, G89.29]  Closed fracture of proximal end of right humerus, unspecified fracture morphology, sequela [S42.201S]  Precautions/Allergies:   Aricept [donepezil]   TREATMENT PLAN:  Effective Dates: 3/29/2021 TO 2021 (90 days). Frequency/Duration: 1 time a week for 90 Day(s) MEDICAL/REFERRING DIAGNOSIS:  Pain in right shoulder [M25.511]  Other chronic pain [G89.29]   DATE OF ONSET: 2020  REFERRING PHYSICIAN: Blanche Mcmahon MD MD Orders: Eval and treat  Return MD Appointment: as needed     Pre-treatment Symptoms/Complaints:  Pt reports feeling okay. Pain: Initial: Pain Intensity 1: 3  Post Session:  3/10   Medications Last Reviewed:  2021  Updated Objective Findings:  None Today   TREATMENT:   THERAPEUTIC EXERCISE: (25 minutes):  Exercises per grid below to improve mobility and strength. Required minimal verbal cues to promote proper body mechanics. Progressed resistance, range and repetitions as indicated. Pulleys x 15  Supine t-bar flexion x 15  Supine t-bar presses x 15  Biceps curls yellow TB x 12  Rows yellow  TB x 12  PROM x 10  Shoulder flex (supine) with yellow TB  Shoulder ext (supine ) with yellow TB  Manual Therapy ( 20:min  PROM stretching and soft tissue massage to help decrease tightness in bicep and pec area. Therapeutic Modalities (  na   ):na        HEP: As directed    Treatment/Session Summary:    · Response to Treatment:  Patient tolerated treatment well. Very complaint with all exercises.    · Communication/Consultation:  None today  · Equipment provided today:  pulleys, red TB  · Recommendations/Intent for next treatment session: Next visit will focus on ROM, strengthening.   Total Treatment Billable Duration:    PT Patient Time In/Time Out  Time In: 1015  Time Out: 3351 Hubert Mcconnell    Future Appointments   Date Time Provider Diane Calderon   4/12/2021  3:15 PM [facility]   5/17/2021 10:20 AM MAT LAB [facility]   5/21/2021  3:00 PM Kristal Martinez MD [facility]

## 2021-04-12 ENCOUNTER — HOSPITAL ENCOUNTER (OUTPATIENT)
Dept: PHYSICAL THERAPY | Age: 86
Discharge: HOME | End: 2021-04-12
Payer: MEDICARE

## 2021-04-12 PROCEDURE — 97110 THERAPEUTIC EXERCISES: CPT

## 2021-04-12 PROCEDURE — 97140 MANUAL THERAPY 1/> REGIONS: CPT

## 2021-04-12 NOTE — PROGRESS NOTES
Shanda Zhao  : 1935  Payor: Dave Gamez / Plan: [address] HMO / Product Type: Managed Care Medicare /  [address] [place] [address]  at [facility] Therapy  [address], [facility], [address]  [phone]   [phone]      OUTPATIENT PHYSICAL THERAPY: Daily Treatment Note 2021  Visit Count:  3    ICD-10: Treatment Diagnosis: Chronic right shoulder pain [M25.511, G89.29]  Closed fracture of proximal end of right humerus, unspecified fracture morphology, sequela [S42.201S]  Precautions/Allergies:   Aricept [donepezil]   TREATMENT PLAN:  Effective Dates: 3/29/2021 TO 2021 (90 days). Frequency/Duration: 1 time a week for 90 Day(s) MEDICAL/REFERRING DIAGNOSIS:  Pain in right shoulder [M25.511]  Other chronic pain [G89.29]   DATE OF ONSET: 2020  REFERRING PHYSICIAN: Lolita Fernandes MD MD Orders: Eval and treat  Return MD Appointment: as needed     Pre-treatment Symptoms/Complaints:  Pt reports feeling okay. Pain: Initial: Pain Intensity 1: 1  Post Session:  1/10   Medications Last Reviewed:  2021  Updated Objective Findings:  None Today   TREATMENT:   THERAPEUTIC EXERCISE: (35 minutes):  Exercises per grid below to improve mobility and strength. Required minimal verbal cues to promote proper body mechanics. Progressed resistance, range and repetitions as indicated. Pulleys x 15  Supine t-bar flexion x 15  Supine t-bar presses x 15  Biceps curls yellow TB x 12  Rows yellow  TB x 12  PROM x 10  Shoulder flex (supine) with yellow TB  Shoulder ext (supine ) with yellow TB  Punches in supine with yellow TB  IR/ER exercises in supine with yellow TB  UBE x 5 min  Manual Therapy ( 10:min  PROM stretching and soft tissue massage to help decrease tightness in bicep and pec area. Therapeutic Modalities (  na   ):na        HEP: As directed    Treatment/Session Summary:    · Response to Treatment:  Patient tolerated treatment well.   Good improvement in ROM, but still needs to work on strength. · Communication/Consultation:  None today  · Equipment provided today:  pulleys, red TB  · Recommendations/Intent for next treatment session: Next visit will focus on ROM, strengthening.   Total Treatment Billable Duration:    PT Patient Time In/Time Out  Time In: 0315  Time Out: 200 Mark Starkey    Future Appointments   Date Time Provider Diane Calderon   4/19/2021 10:15 AM Travon [facility]   5/17/2021 10:20 AM MAT LAB [facility] MAT [facility]   5/21/2021  3:00 PM Clair Holm MD [facility] MAT [facility]

## 2021-04-19 ENCOUNTER — HOSPITAL ENCOUNTER (OUTPATIENT)
Dept: PHYSICAL THERAPY | Age: 86
Discharge: HOME | End: 2021-04-19
Payer: MEDICARE

## 2021-04-19 PROCEDURE — 97110 THERAPEUTIC EXERCISES: CPT

## 2021-04-19 PROCEDURE — 97140 MANUAL THERAPY 1/> REGIONS: CPT

## 2021-04-19 NOTE — PROGRESS NOTES
Magdalena Zhao  : 1935  Payor: Haley Bach / Plan: [address] HMO / Product Type: Managed Care Medicare /  [facility] [place]  at [facility] Therapy  [address] [facility], [facility], [address]  [phone]   [phone]      OUTPATIENT PHYSICAL THERAPY: Daily Treatment Note 2021  Visit Count:  4    ICD-10: Treatment Diagnosis: Chronic right shoulder pain [M25.511, G89.29]  Closed fracture of proximal end of right humerus, unspecified fracture morphology, sequela [S42.201S]  Precautions/Allergies:   Aricept [donepezil]   TREATMENT PLAN:  Effective Dates: 3/29/2021 TO 2021 (90 days). Frequency/Duration: 1 time a week for 90 Day(s) MEDICAL/REFERRING DIAGNOSIS:  Pain in right shoulder [M25.511]  Other chronic pain [G89.29]   DATE OF ONSET: 2020  REFERRING PHYSICIAN: Soy Cooper MD MD Orders: Eval and treat  Return MD Appointment: as needed     Pre-treatment Symptoms/Complaints:  Pt reports arm hurts today. Pain: Initial: Pain Intensity 1: 4  Post Session:  2/10   Medications Last Reviewed:  2021  Updated Objective Findings:  None Today   TREATMENT:   THERAPEUTIC EXERCISE: (35 minutes):  Exercises per grid below to improve mobility and strength. Required minimal verbal cues to promote proper body mechanics. Progressed resistance, range and repetitions as indicated. Pulleys x 15  Supine t-bar flexion x 15  Supine t-bar presses x 15  Biceps curls yellow TB x 12  Rows yellow  TB x 12  PROM x 10  Shoulder flex (supine) with yellow TB  Shoulder ext (supine ) with yellow TB  Punches in supine with yellow TB  IR/ER exercises in supine with yellow TB  UBE x 5 min  Manual Therapy ( 10:min  PROM stretching and soft tissue massage to help decrease tightness in bicep and pec area. Therapeutic Modalities (  na   ):na        HEP: As directed    Treatment/Session Summary:    · Response to Treatment:  Patient tolerated treatment with mild discomfort.  Less discomfort following treatment session. · Communication/Consultation:  None today  · Equipment provided today:  pulleys, red TB  · Recommendations/Intent for next treatment session: Next visit will focus on ROM, strengthening.   Total Treatment Billable Duration:    PT Patient Time In/Time Out  Time In: 1015  Time Out: [address] [place], [place]    Future Appointments   Date Time Provider Diane Calderon   4/26/2021  2:30 PM [facility]   5/17/2021 10:20 AM MAT LAB [facility] MAT [facility]   5/21/2021  3:00 PM Celine Cohen MD [facility]

## 2021-04-26 ENCOUNTER — HOSPITAL ENCOUNTER (OUTPATIENT)
Dept: PHYSICAL THERAPY | Age: 86
Discharge: HOME | End: 2021-04-26
Payer: MEDICARE

## 2021-04-26 PROCEDURE — 97110 THERAPEUTIC EXERCISES: CPT

## 2021-04-26 PROCEDURE — 97140 MANUAL THERAPY 1/> REGIONS: CPT

## 2021-04-26 NOTE — PROGRESS NOTES
Dalila Zhao  : 1935  Payor: Stuart Pizano / Plan: [address] HMO / Product Type: Managed Care Medicare /  [facility] [place]  at [facility] Therapy  [address], [facility], [address]  [phone]   [phone]      OUTPATIENT PHYSICAL THERAPY: Daily Treatment Note 2021  Visit Count:  5    ICD-10: Treatment Diagnosis: Chronic right shoulder pain [M25.511, G89.29]  Closed fracture of proximal end of right humerus, unspecified fracture morphology, sequela [S42.201S]  Precautions/Allergies:   Aricept [donepezil]   TREATMENT PLAN:  Effective Dates: 3/29/2021 TO 2021 (90 days). Frequency/Duration: 1 time a week for 90 Day(s) MEDICAL/REFERRING DIAGNOSIS:  Pain in right shoulder [M25.511]  Other chronic pain [G89.29]   DATE OF ONSET: 2020  REFERRING PHYSICIAN: Jacquie Plaza MD MD Orders: Eval and treat  Return MD Appointment: as needed     Pre-treatment Symptoms/Complaints:  Pt reports arm feels pretty good. Pain: Initial: Pain Intensity 1: 2  Post Session:  2/10   Medications Last Reviewed:  2021  Updated Objective Findings:  None Today   TREATMENT:   THERAPEUTIC EXERCISE: (35 minutes):  Exercises per grid below to improve mobility and strength. Required minimal verbal cues to promote proper body mechanics. Progressed resistance, range and repetitions as indicated. Nu step x 10 min level 1  Seated bicep curls # 2 3 x 10  Seated shoulder press with # 1 3 x 10  Y's seated red TB 3 x 10  Triceps ext with red TB seated 3 x 10  Standing shoulder ext with red TB 2 x 10  Standing rows with red TB 2 x 10   Seated punches with red TB x 20  Manual Therapy ( 10:min  PROM stretching and soft tissue massage to help decrease tightness in bicep and pec area. Therapeutic Modalities (  na   ):na        HEP: As directed    Treatment/Session Summary:    · Response to Treatment:  Patient tolerated treatment well today.  Good improvement in ROM.  · Communication/Consultation:  None today  · Equipment provided today:  pulleys, red TB  · Recommendations/Intent for next treatment session: Next visit will focus on ROM, strengthening.   Total Treatment Billable Duration:    PT Patient Time In/Time Out  Time In: 0230  Time Out: 1306 Nicky Oviedo E, PTA    Future Appointments   Date Time Provider Diane Calderon   5/17/2021 10:20 AM MAT LAB [facility]   5/21/2021  3:00 PM Gerhard Duarte MD [facility]

## 2021-04-27 ENCOUNTER — APPOINTMENT (OUTPATIENT)
Dept: PHYSICAL THERAPY | Age: 86
End: 2021-04-27
Payer: MEDICARE

## 2021-05-03 ENCOUNTER — HOSPITAL ENCOUNTER (OUTPATIENT)
Dept: PHYSICAL THERAPY | Age: 86
Discharge: HOME | End: 2021-05-03
Payer: MEDICARE

## 2021-05-03 PROCEDURE — 97110 THERAPEUTIC EXERCISES: CPT

## 2021-05-03 NOTE — PROGRESS NOTES
Ronel Draper Brace  : 1935  Payor: Yury Rivera / Plan: [address] HMO / Product Type: Managed Care Medicare /  [facility] [place]  at [facility] Therapy  [address], [facility], [address]  [phone]   [phone]      OUTPATIENT PHYSICAL THERAPY: Daily Treatment Note 5/3/2021  Visit Count:  6    ICD-10: Treatment Diagnosis: Chronic right shoulder pain [M25.511, G89.29]  Closed fracture of proximal end of right humerus, unspecified fracture morphology, sequela [S42.201S]  Precautions/Allergies:   Aricept [donepezil]   TREATMENT PLAN:  Effective Dates: 3/29/2021 TO 2021 (90 days). Frequency/Duration: 1 time a week for 90 Day(s) MEDICAL/REFERRING DIAGNOSIS:  Pain in right shoulder [M25.511]  Other chronic pain [G89.29]   DATE OF ONSET: 2020  REFERRING PHYSICIAN: Clair Holm MD MD Orders: Eval and treat  Return MD Appointment: as needed     Pre-treatment Symptoms/Complaints:  Pt reports arm feels fine today  Pain: Initial:    Post Session:  2/10   Medications Last Reviewed:  5/3/2021  Updated Objective Findings:  None Today   TREATMENT:   THERAPEUTIC EXERCISE: (20 minutes):  Exercises per grid below to improve mobility and strength. Required minimal verbal cues to promote proper body mechanics. Progressed resistance, range and repetitions as indicated. Nu step x 10 min level 1  Seated bicep curls # 3 3 x 10  Seated shoulder press with # 1 3 x 10  Y's seated red TB 3 x 10-- not done  Triceps ext with red TB seated 3 x 10  Standing shoulder ext with red TB 2 x 10  Standing rows with red TB 2 x 10   Seated punches with red TB x 20-- not done  Manual Therapy ( 10:min  PROM stretching and soft tissue massage to help decrease tightness in bicep and pec area. Therapeutic Modalities (  na   ):na        HEP: As directed    Treatment/Session Summary:    · Response to Treatment:  Patient tolerated treatment well today.  Good improvement in ROM.  · Communication/Consultation:  None today  · Equipment provided today:  pulleys, red TB  · Recommendations/Intent for next treatment session: Next visit will focus on ROM, strengthening.   Total Treatment Billable Duration:    PT Patient Time In/Time Out  Time In: 1440  Time Out: 3291 [facility], PT,   Only 20min supervised, only billing 1 TE    Future Appointments   Date Time Provider Diane Yumiko   5/17/2021 10:20 AM MAT LAB [facility]   5/21/2021  3:00 PM Blanche Mcmahon MD [facility]

## 2021-10-01 ENCOUNTER — APPOINTMENT (OUTPATIENT)
Dept: GENERAL RADIOLOGY | Age: 86
End: 2021-10-01
Attending: EMERGENCY MEDICINE
Payer: MEDICARE

## 2021-10-01 ENCOUNTER — HOSPITAL ENCOUNTER (EMERGENCY)
Age: 86
Discharge: HOME | End: 2021-10-01
Attending: EMERGENCY MEDICINE
Payer: MEDICARE

## 2021-10-01 ENCOUNTER — APPOINTMENT (OUTPATIENT)
Dept: CT IMAGING | Age: 86
End: 2021-10-01
Attending: PHYSICIAN ASSISTANT
Payer: MEDICARE

## 2021-10-01 VITALS
WEIGHT: 140 LBS | HEIGHT: 65 IN | SYSTOLIC BLOOD PRESSURE: 147 MMHG | HEART RATE: 81 BPM | RESPIRATION RATE: 15 BRPM | DIASTOLIC BLOOD PRESSURE: 66 MMHG | TEMPERATURE: 98.2 F | BODY MASS INDEX: 23.32 KG/M2 | OXYGEN SATURATION: 95 %

## 2021-10-01 DIAGNOSIS — T14.8XXA OPEN WOUND OF SKIN: ICD-10-CM

## 2021-10-01 DIAGNOSIS — S42.295A OTHER CLOSED NONDISPLACED FRACTURE OF PROXIMAL END OF LEFT HUMERUS, INITIAL ENCOUNTER: ICD-10-CM
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
## 2021-10-01 LAB
ALBUMIN SERPL-MCNC: 3.2 G/DL (ref 3.2–4.6)
ALBUMIN/GLOB SERPL: 0.7 {RATIO} (ref 1.2–3.5)
ALP SERPL-CCNC: 92 U/L (ref 50–130)
ALT SERPL-CCNC: 17 U/L (ref 12–65)
ANION GAP SERPL CALC-SCNC: 7 MMOL/L (ref 7–16)
AST SERPL-CCNC: 16 U/L (ref 15–37)
ATRIAL RATE: 75 BPM
BASOPHILS # BLD: 0 K/UL (ref 0–0.2)
BASOPHILS NFR BLD: 0 % (ref 0–2)
BILIRUB SERPL-MCNC: 0.3 MG/DL (ref 0.2–1.1)
BUN SERPL-MCNC: 14 MG/DL (ref 8–23)
CALCIUM SERPL-MCNC: 9.3 MG/DL (ref 8.3–10.4)
CALCULATED P AXIS, ECG09: 74 DEGREES
CALCULATED R AXIS, ECG10: 49 DEGREES
CALCULATED T AXIS, ECG11: 71 DEGREES
CHLORIDE SERPL-SCNC: 107 MMOL/L (ref 98–107)
CO2 SERPL-SCNC: 29 MMOL/L (ref 21–32)
CREAT SERPL-MCNC: 0.73 MG/DL (ref 0.6–1)
DIAGNOSIS, 93000: NORMAL
DIFFERENTIAL METHOD BLD: ABNORMAL
EOSINOPHIL # BLD: 0 K/UL (ref 0–0.8)
EOSINOPHIL NFR BLD: 0 % (ref 0.5–7.8)
ERYTHROCYTE [DISTWIDTH] IN BLOOD BY AUTOMATED COUNT: 13.2 % (ref 11.9–14.6)
GLOBULIN SER CALC-MCNC: 4.6 G/DL (ref 2.3–3.5)
GLUCOSE SERPL-MCNC: 173 MG/DL (ref 65–100)
HCT VFR BLD AUTO: 36.9 % (ref 35.8–46.3)
HGB BLD-MCNC: 12.1 G/DL (ref 11.7–15.4)
IMM GRANULOCYTES # BLD AUTO: 0.1 K/UL (ref 0–0.5)
IMM GRANULOCYTES NFR BLD AUTO: 1 % (ref 0–5)
LYMPHOCYTES # BLD: 0.7 K/UL (ref 0.5–4.6)
LYMPHOCYTES NFR BLD: 7 % (ref 13–44)
MAGNESIUM SERPL-MCNC: 2.4 MG/DL (ref 1.8–2.4)
MCH RBC QN AUTO: 30.2 PG (ref 26.1–32.9)
MCHC RBC AUTO-ENTMCNC: 32.8 G/DL (ref 31.4–35)
MCV RBC AUTO: 92 FL (ref 79.6–97.8)
MONOCYTES # BLD: 0.7 K/UL (ref 0.1–1.3)
MONOCYTES NFR BLD: 7 % (ref 4–12)
NEUTS SEG # BLD: 8.3 K/UL (ref 1.7–8.2)
NEUTS SEG NFR BLD: 85 % (ref 43–78)
NRBC # BLD: 0 K/UL (ref 0–0.2)
P-R INTERVAL, ECG05: 178 MS
PLATELET # BLD AUTO: 340 K/UL (ref 150–450)
PMV BLD AUTO: 9 FL (ref 9.4–12.3)
POTASSIUM SERPL-SCNC: 3.1 MMOL/L (ref 3.5–5.1)
PROT SERPL-MCNC: 7.8 G/DL (ref 6.3–8.2)
Q-T INTERVAL, ECG07: 396 MS
QRS DURATION, ECG06: 74 MS
QTC CALCULATION (BEZET), ECG08: 442 MS
RBC # BLD AUTO: 4.01 M/UL (ref 4.05–5.2)
SODIUM SERPL-SCNC: 143 MMOL/L (ref 136–145)
TROPONIN-HIGH SENSITIVITY: 12 PG/ML (ref 0–14)
TROPONIN-HIGH SENSITIVITY: 13.3 PG/ML (ref 0–14)
VENTRICULAR RATE, ECG03: 75 BPM
WBC # BLD AUTO: 9.8 K/UL (ref 4.3–11.1)

## 2021-10-01 PROCEDURE — 74011250636 HC RX REV CODE- 250/636: Performed by: PHYSICIAN ASSISTANT

## 2021-10-01 PROCEDURE — 71046 X-RAY EXAM CHEST 2 VIEWS: CPT

## 2021-10-01 PROCEDURE — 93005 ELECTROCARDIOGRAM TRACING: CPT | Performed by: PHYSICIAN ASSISTANT

## 2021-10-01 PROCEDURE — 85025 COMPLETE CBC W/AUTO DIFF WBC: CPT

## 2021-10-01 PROCEDURE — 70450 CT HEAD/BRAIN W/O DYE: CPT

## 2021-10-01 PROCEDURE — 96374 THER/PROPH/DIAG INJ IV PUSH: CPT

## 2021-10-01 PROCEDURE — 96375 TX/PRO/DX INJ NEW DRUG ADDON: CPT

## 2021-10-01 PROCEDURE — 83735 ASSAY OF MAGNESIUM: CPT

## 2021-10-01 PROCEDURE — 73110 X-RAY EXAM OF WRIST: CPT

## 2021-10-01 PROCEDURE — 84484 ASSAY OF TROPONIN QUANT: CPT

## 2021-10-01 PROCEDURE — 73060 X-RAY EXAM OF HUMERUS: CPT

## 2021-10-01 PROCEDURE — 80053 COMPREHEN METABOLIC PANEL: CPT

## 2021-10-01 PROCEDURE — 99285 EMERGENCY DEPT VISIT HI MDM: CPT

## 2021-10-01 RX ORDER — CEPHALEXIN 500 MG/1
500 CAPSULE ORAL 4 TIMES DAILY
Qty: 28 CAPSULE | Refills: 0 | Status: SHIPPED | OUTPATIENT
Start: 2021-10-01 | End: 2021-10-08

## 2021-10-01 RX ORDER — ONDANSETRON 2 MG/ML
4 INJECTION INTRAMUSCULAR; INTRAVENOUS
Status: COMPLETED | OUTPATIENT
Start: 2021-10-01 | End: 2021-10-01

## 2021-10-01 RX ORDER — HYDROMORPHONE HYDROCHLORIDE 1 MG/ML
0.2 INJECTION, SOLUTION INTRAMUSCULAR; INTRAVENOUS; SUBCUTANEOUS
Status: COMPLETED | OUTPATIENT
Start: 2021-10-01 | End: 2021-10-01

## 2021-10-01 RX ORDER — HYDROCODONE BITARTRATE AND ACETAMINOPHEN 5; 325 MG/1; MG/1
1 TABLET ORAL
Qty: 18 TABLET | Refills: 0 | Status: SHIPPED | OUTPATIENT
Start: 2021-10-01 | End: 2021-10-04

## 2021-10-01 RX ADMIN — ONDANSETRON 4 MG: 2 INJECTION INTRAMUSCULAR; INTRAVENOUS at 10:38

## 2021-10-01 RX ADMIN — HYDROMORPHONE HYDROCHLORIDE 0.2 MG: 1 INJECTION, SOLUTION INTRAMUSCULAR; INTRAVENOUS; SUBCUTANEOUS at 10:37

## 2021-10-01 NOTE — ED TRIAGE NOTES
Per EMS patient fel from bed at facility and landed on her left arm. Pt has 10/10 pain on left arm and also hit her head on the left side. Pt has knot and controlled bleeding on forehead.

## 2021-10-01 NOTE — ED PROVIDER NOTES
Patient states that she fell out of bed this morning subsequently landing on her left arm and forehead. She did not have any loss of consciousness. She states she does not remember getting caught up in any blankets, her legs were free but she literally just fell out of bed and does not know why. She has no visual changes, nausea, vomiting, chest pain, shortness of breath, open wounds, abdominal pain, swelling/tingling or weakness to her arms or legs or other new symptoms. According to her chart, she does have a history of dementia. The history is provided by the patient. Fall  The accident occurred 1 to 2 hours ago. She fell from a height of 3 - 5 ft. The pain is present in the left shoulder and head. The pain is at a severity of 8/10. The pain is moderate. She was ambulatory at the scene. There was no entrapment after the fall. There was no drug use involved in the accident. There was no alcohol use involved in the accident. Pertinent negatives include no visual change, no fever, no numbness, no abdominal pain, no bowel incontinence, no nausea, no vomiting, no hematuria, no headaches, no extremity weakness, no hearing loss, no loss of consciousness, no tingling and no laceration. The risk factors include dementia and being elderly. The symptoms are aggravated by activity and standing. She has tried nothing for the symptoms. Past Medical History:   Diagnosis Date    Broken bones     Diabetes ([address]     Ear problems     Hearing problem     History of mammogram 2015    History of Papanicolaou smear of cervix >5 years ago    Hypertension        Past Surgical History:   Procedure Laterality Date    [id number] [facility] COCHLEAR IMPLANT  02/21/2017    [place], [place] HX COLONOSCOPY  2008   Marleni Kenney ORTHOPAEDIC  2008    lumbar laminectomy;  [place], [place]    HX TUBAL LIGATION           Family History:   Problem Relation Age of Onset    Stroke Mother     Colon Cancer Sister     Breast Cancer Sister    Devorah Morales Heart Disease Maternal Grandmother        Social History     Socioeconomic History    Marital status:      Spouse name: Not on file    Number of children: 11    Years of education: Not on file    Highest education level: Not on file   Occupational History    Occupation: retired, manufacturing, fork    Tobacco Use    Smoking status: Never Smoker    Smokeless tobacco: Never Used   Vaping Use    Vaping Use: Never used   Substance and Sexual Activity    Alcohol use: No    Drug use: No    Sexual activity: Not on file   Other Topics Concern    Not on file   Social History Narrative    Not on file     Social Determinants of Health     Financial Resource Strain:     Difficulty of Paying Living Expenses:    Food Insecurity:     Worried About [employer] in the Last Year:     [employer] in the Last Year:    Transportation Needs:     Lack of Transportation (Medical):  Lack of Transportation (Non-Medical):    Physical Activity:     Days of Exercise per Week:     Minutes of Exercise per Session:    Stress:     Feeling of Stress :    Social Connections:     Frequency of Communication with Friends and Family:     Frequency of Social Gatherings with Friends and Family:     Attends Spiritism Services:     Active Member of Clubs or Organizations:     Attends Club or Organization Meetings:     Marital Status:    Intimate Partner Violence:     Fear of Current or Ex-Partner:     Emotionally Abused:     Physically Abused:     Sexually Abused: ALLERGIES: Aricept [donepezil]    Review of Systems   Constitutional: Negative. Negative for fever. HENT: Negative. Eyes: Negative. Respiratory: Negative. Cardiovascular: Negative. Gastrointestinal: Negative. Negative for abdominal pain, bowel incontinence, nausea and vomiting. Genitourinary: Negative. Negative for hematuria. Musculoskeletal: Negative. Negative for extremity weakness.         Left shoulder pain   Skin: Positive for wound. Neurological: Negative. Negative for tingling, loss of consciousness, numbness and headaches. Psychiatric/Behavioral: Negative. All other systems reviewed and are negative. Vitals:    10/01/21 0853 10/01/21 0854 10/01/21 0900 10/01/21 0924   BP: (!) 133/58 (!) 133/58     Pulse:       Resp:       Temp:       SpO2: 100%  98% 98%   Weight:       Height:                Physical Exam  Vitals and nursing note reviewed. Constitutional:       Appearance: She is well-developed. HENT:      Head: Normocephalic and atraumatic. Jaw: There is normal jaw occlusion. Right Ear: External ear normal.      Left Ear: External ear normal.      Nose: Nose normal.      Mouth/Throat:      Mouth: Mucous membranes are moist.   Eyes:      Conjunctiva/sclera: Conjunctivae normal.      Pupils: Pupils are equal, round, and reactive to light. Cardiovascular:      Rate and Rhythm: Normal rate and regular rhythm. Heart sounds: Normal heart sounds. Pulmonary:      Effort: Pulmonary effort is normal.      Breath sounds: Normal breath sounds. Abdominal:      General: Bowel sounds are normal.      Palpations: Abdomen is soft. Musculoskeletal:         General: Tenderness present. Left shoulder: Tenderness present. Decreased range of motion. Arms:       Cervical back: Normal range of motion and neck supple. Skin:     General: Skin is warm and dry. Capillary Refill: Capillary refill takes less than 2 seconds. Findings: No laceration. Neurological:      General: No focal deficit present. Mental Status: She is alert and oriented to person, place, and time. GCS: GCS eye subscore is 4. GCS verbal subscore is 5. GCS motor subscore is 6. Cranial Nerves: Cranial nerves are intact. Sensory: Sensation is intact. Motor: Motor function is intact. Coordination: Coordination is intact. Gait: Gait is intact.       Deep Tendon Reflexes: Reflexes are normal and symmetric. Reflex Scores:       Tricep reflexes are 2+ on the right side and 2+ on the left side. Bicep reflexes are 2+ on the right side and 2+ on the left side. Brachioradialis reflexes are 2+ on the right side and 2+ on the left side. Patellar reflexes are 2+ on the right side and 2+ on the left side. Achilles reflexes are 2+ on the right side and 2+ on the left side. Psychiatric:         Behavior: Behavior normal.         Thought Content: Thought content normal.         Judgment: Judgment normal.          MDM  Number of Diagnoses or Management Options     Amount and/or Complexity of Data Reviewed  Clinical lab tests: ordered  Tests in the radiology section of CPT®: ordered  Discuss the patient with other providers: yes (Dr. Watson Pinto)    Risk of Complications, Morbidity, and/or Mortality  Presenting problems: moderate  Diagnostic procedures: moderate  Management options: moderate           Procedures          9:40 AM Spoke with Dr. Watson Pinto regarding patient. We discussed the history, physical exam and work up. 10:42 AM Spoke again with Dr. Watson Pinto regarding patient. He reviewed the EKG. He states no prior EKG needed. Patient is moving neck just fine, no CT neck needed per Dr. Watson Pinto. 12:34 PM Perfect served Jenniffer LEAHY with Ortho regarding patient. We discussed the history, physical exam, work-up and disposition. Patient is distally neurovascularly intact. He states she can go home and follow-up with Dr. Natalia Santiago in the office. Spoke also with patient's daughter regarding patient's care upon discharge. She is in assisted living and they have care that can be provided around-the-clock now that she is in a sling. Her daughter states this happened in December on the other arm. Patient does have dementia. She should rest, ice, elevate and avoid painful activities.   I have sent pain medicine as well as some Keflex for the skin tear on her forehead which was cleaned and Steri-Strips placed by the nurse. Nothing to close at this time. Patient is stable for discharge and able to transfer to the wheelchair. Return to the ED if worsening in any way. The patient was observed in the ED. Results Reviewed:      Recent Results (from the past 24 hour(s))   EKG, 12 LEAD, INITIAL    Collection Time: 10/01/21 10:35 AM   Result Value Ref Range    Ventricular Rate 75 BPM    Atrial Rate 75 BPM    P-R Interval 178 ms    QRS Duration 74 ms    Q-T Interval 396 ms    QTC Calculation (Bezet) 442 ms    Calculated P Axis 74 degrees    Calculated R Axis 49 degrees    Calculated T Axis 71 degrees    Diagnosis       Normal sinus rhythm  Nonspecific ST abnormality  Abnormal ECG  No previous ECGs available  Confirmed by Susanna Dykes MD (), ZAFAR ([id number]) on 10/1/2021 1:03:49 PM     CBC WITH AUTOMATED DIFF    Collection Time: 10/01/21 10:39 AM   Result Value Ref Range    WBC 9.8 4.3 - 11.1 K/uL    RBC 4.01 (L) 4.05 - 5.2 M/uL    HGB 12.1 11.7 - 15.4 g/dL    HCT 36.9 35.8 - 46.3 %    MCV 92.0 79.6 - 97.8 FL    MCH 30.2 26.1 - 32.9 PG    MCHC 32.8 31.4 - 35.0 g/dL    RDW 13.2 11.9 - 14.6 %    PLATELET 831 517 - 155 K/uL    MPV 9.0 (L) 9.4 - 12.3 FL    ABSOLUTE NRBC 0.00 0.0 - 0.2 K/uL    DF AUTOMATED      NEUTROPHILS 85 (H) 43 - 78 %    LYMPHOCYTES 7 (L) 13 - 44 %    MONOCYTES 7 4.0 - 12.0 %    EOSINOPHILS 0 (L) 0.5 - 7.8 %    BASOPHILS 0 0.0 - 2.0 %    IMMATURE GRANULOCYTES 1 0.0 - 5.0 %    ABS. NEUTROPHILS 8.3 (H) 1.7 - 8.2 K/UL    ABS. LYMPHOCYTES 0.7 0.5 - 4.6 K/UL    ABS. MONOCYTES 0.7 0.1 - 1.3 K/UL    ABS. EOSINOPHILS 0.0 0.0 - 0.8 K/UL    ABS. BASOPHILS 0.0 0.0 - 0.2 K/UL    ABS. IMM.  GRANS. 0.1 0.0 - 0.5 K/UL   TROPONIN-HIGH SENSITIVITY    Collection Time: 10/01/21 10:39 AM   Result Value Ref Range    Troponin-High Sensitivity 13.3 0 - 14 pg/mL   MAGNESIUM    Collection Time: 10/01/21 10:39 AM   Result Value Ref Range    Magnesium 2.4 1.8 - 2.4 mg/dL   METABOLIC PANEL, COMPREHENSIVE    Collection Time: 10/01/21 10:39 AM   Result Value Ref Range    Sodium 143 136 - 145 mmol/L    Potassium 3.1 (L) 3.5 - 5.1 mmol/L    Chloride 107 98 - 107 mmol/L    CO2 29 21 - 32 mmol/L    Anion gap 7 7 - 16 mmol/L    Glucose 173 (H) 65 - 100 mg/dL    BUN 14 8 - 23 MG/DL    Creatinine 0.73 0.6 - 1.0 MG/DL    GFR est AA >60 >60 ml/min/1.73m2    GFR est non-AA >60 >60 ml/min/1.73m2    Calcium 9.3 8.3 - 10.4 MG/DL    Bilirubin, total 0.3 0.2 - 1.1 MG/DL    ALT (SGPT) 17 12 - 65 U/L    AST (SGOT) 16 15 - 37 U/L    Alk. phosphatase 92 50 - 130 U/L    Protein, total 7.8 6.3 - 8.2 g/dL    Albumin 3.2 3.2 - 4.6 g/dL    Globulin 4.6 (H) 2.3 - 3.5 g/dL    A-G Ratio 0.7 (L) 1.2 - 3.5     TROPONIN-HIGH SENSITIVITY    Collection Time: 10/01/21 12:16 PM   Result Value Ref Range    Troponin-High Sensitivity 12.0 0 - 14 pg/mL     XR HUMERUS LT   Final Result   1. Minimally displaced three-part fracture of the left proximal humerus. 2. No acute left wrist fracture      XR WRIST LT AP/LAT/OBL MIN 3V   Final Result   1. Minimally displaced three-part fracture of the left proximal humerus. 2. No acute left wrist fracture      XR CHEST PA LAT   Final Result   1. No acute cardiopulmonary abnormality. 2. Fracture of the left proximal humerus. CT HEAD WO CONT   Final Result   1. No acute intracranial abnormality. 2. Moderate cerebral atrophy with moderate bilateral white matter   hypoattenuation most in keeping with chronic microangiopathic disease. 3. Small left frontal scalp hematoma without underlying skull fracture. I discussed the results of all labs, procedures, radiographs, and treatments with the patient and available family. Treatment plan is agreed upon and the patient is ready for discharge. All voiced understanding of the discharge plan and medication instructions or changes as appropriate. Questions about treatment in the ED were answered.   All were encouraged to return should symptoms worsen or new problems develop.

## 2021-10-01 NOTE — DISCHARGE INSTRUCTIONS
Wash two-three times daily with soap and water, blot dry, apply neosporin and a clean dressing. Watch for redness, swelling, pus, increasing pain, fever and return if any of those symptoms begin. Finish all of the antibiotics. Return to the ED if worse. Rest, ice, elevate arm and use sling.

## 2021-10-01 NOTE — ED NOTES
Cleaned pts wound on forehead, applied steristrips. I have reviewed discharge instructions with the caregiver. The caregiver verbalized understanding. Patient left ED via Discharge Method: ambulatory to Home with family. Opportunity for questions and clarification provided. Patient given 2 scripts. To continue your aftercare when you leave the hospital, you may receive an automated call from our care team to check in on how you are doing. This is a free service and part of our promise to provide the best care and service to meet your aftercare needs.  If you have questions, or wish to unsubscribe from this service please call [phone]. Thank you for Choosing our [facility] Emergency Department.

## 2021-10-01 NOTE — ED NOTES
Pts daughter states pt was seen at  lat night for congested cough, pt was given prednisone and a zpack.

## 2021-11-08 ENCOUNTER — HOSPITAL ENCOUNTER (OUTPATIENT)
Dept: PHYSICAL THERAPY | Age: 86
Discharge: HOME | End: 2021-11-08
Attending: ORTHOPAEDIC SURGERY
Payer: MEDICARE

## 2021-11-08 DIAGNOSIS — S42.292A OTHER CLOSED DISPLACED FRACTURE OF PROXIMAL END OF LEFT HUMERUS, INITIAL ENCOUNTER: ICD-10-CM

## 2021-11-08 PROCEDURE — 97161 PT EVAL LOW COMPLEX 20 MIN: CPT

## 2021-11-08 NOTE — PROGRESS NOTES
Jae Hathaway [employer]  : 1935  Payor: Nemesio Garcia / Plan: [address] HMO / Product Type: Managed Care Medicare /  [facility] [place]  at [facility]  [address], [facility], [address]  [phone]   [phone]                                                            Jose Antonio Kyle MD      OUTPATIENT PHYSICAL THERAPY: Daily Treatment Note 2021 Visit Count:  Visit count could not be calculated. Make sure you are using a visit which is associated with an episode. 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 Pain: Initial:0/10  Medications Last Reviewed:  2021     Post Session: 0/10   Updated Objective Findings: See Initial Eval for more details. limited arom flexion/scaption/abduction --needs PROM and strengthening        TREATMENT:   THERAPEUTIC EXERCISE: (unbillable minutes):  Exercises per grid below to improve mobility, strength and balance. Required minimal visual, verbal and manual cues to promote proper body alignment and promote proper body posture. Progressed resistance and complexity of movement as indicated. Note: per MD Note: Full active and full passive range of motion of the left shoulder and light strengthening with up to 15 pounds resistance.      Date:  2021 Date:   Date:     Activity/Exercise Parameters Parameters Parameters   Education HEP, POC, PT goals, anatomy/pathology     Wall slides      Seated AAROM (flexion, abduction) 10\"x5 ea     Pulleys                              MANUAL THERAPY: (unbillable minutes): Joint mobilization, Soft tissue mobilization was utilized and necessary because of the patient's restricted joint motion and restricted motion of soft tissue mobility. Date  11/8/2021    Technique Used Grade  Level # Time(s) Effect while being performed   PROM  L GH  Improve ROM                                                          HEP Log Date 1.    11/8/2021   2.  11/8/2021   3. 11/8/2021   4.    5.           [employer] [facility]  Treatment/Session Summary:    Response to Treatment: Pt demonstrated understanding of POC and initial HEP. No increase in pain or adverse reactions. Communication/Consultation:  POC, HEP, PT goals, Faxed initial evaluation to MD.   Equipment provided today: HEP Handout   Recommendations/Intent for next treatment session:   Next visit will focus on Manual Therapy Core Stability Pain Science Education RTC strengthening soft tissue mobilization. Treatment Plan of Care Effective Dates: 11/8/2021 TO 2/6/2022 (90 days).   Frequency/Duration: 2-3 times a week for 90 Days             Total Treatment Billable Duration:   25  Rx plus Migel Dyson DPT    Future Appointments   Date Time Provider Diane Topetei   11/8/2021  9:30 AM Ruben Quevedo DPT [facility]   12/2/2021 10:30 AM Igor Mg MD [facility]

## 2021-11-08 NOTE — THERAPY EVALUATION
Jersey Cushing Brace  : 1935      Payor: Nasima Santos / Plan: [address] HMO / Product Type: Managed Care Medicare /    [facility] [place]  at [facility]  [address], [facility], [address]  [phone]   [phone]                OUTPATIENT PHYSICAL THERAPY:Initial Assessment: 2021    ICD-10: Treatment Diagnosis:   Pain in Left Shoulder (M25.512)  Stiffness of Left Shoulder not elsewhere specified (M25.612)  Primary Osteoarthritis of Left Shoulder (M19.012)  Other closed displaced fracture of proximal end of left humerus, initial encounter [S42.292A]             Precautions/Allergies:   Aricept [donepezil]   Fall Risk Score: 5 (? 5 = High Risk)  MD Orders: Eval and Treat  MEDICAL/REFERRING DIAGNOSIS:  Other closed displaced fracture of proximal end of left humerus, initial encounter [S42.292A]   DATE OF ONSET: *5 weeks post proximal L humerus fx healed non operatively  REFERRING PHYSICIAN: Kelsey Jane MD  RETURN PHYSICIAN APPOINTMENT: TBD by patient      INITIAL ASSESSMENT:   Ms. Kellen Ortiz presents to physical therapy with decreased strength, ROM, joint mobility, functional mobility. These S/S are consistent with 5 weeks post L proximal humerus fx healed non operatively. She is pleasantly confused overall (unable to provide hx or living arrangements), [address] (cochlear implants), and uses rolling walker for ambulation. Patient will benefit from skilled physical therapy for manual therapeutic techniques (as appropriate), therapeutic exercises and activities, neuromuscular re-education, and comprehensive home exercises program to address current impairments and functional limitations. Note: per MD Note: Full active and full passive range of motion of the left shoulder and light strengthening with up to 15 pounds resistance.     Cheko Tinsley will benefit from skilled PT (medically necessary) in order to address above deficits affecting participation in basic ADLs and overall functional tolerance. PROBLEM LIST (Impacting functional limitations):  · Decreased Strength  · Decreased ADL/Functional Activities  · Increased Pain  · Decreased Activity Tolerance  · Increased Shortness of Breath  · Decreased Flexibility/Joint Mobility  · Decreased Real with Home Exercise Program INTERVENTIONS PLANNED:  1. Cold  2. Family Education  3. Home Exercise Program (HEP)  4. Manual Therapy  5. Neuromuscular Re-education/Strengthening  6. Range of Motion (ROM)  7. Therapeutic Activites  8. Therapeutic Exercise/Strengthening  9. Transfer Training  10. Ultrasound   TREATMENT PLAN:  Effective Dates: 11/8/2021 TO 2/6/2022 (90 days). Frequency/Duration: 2-3 times a week for 90 Days  GOALS: (Goals have been discussed and agreed upon with patient.)     Short-Term Goals~4 weeks  Goal Met   1. Daisha Kem Brace will report <=2/10 pain with don/doffing clothing as well as minimal/no difficulty. 1.  [] Date:   2. Daisha Kem Brace will demonstrate improvement in active shoulder scaption to >100 degrees to increase UE function and participation in ADLs. 2.  [] Date:   3. Daisha Kem Brace will demonstrate demonstrate improvement in active shoulder abduction to >100 degrees to increase UE function and participation in ADLs. 3.  [] Date:   4. Daisha Kem Brace will show a greater than 8 point improvement on the ACES in order to show an increase in upper extremity function. 4.  [] Date:   5.  5.  [] Date:   6.  6.  [] Date:         Long Term Goals~8 weeks Goal Met   1. Daisha Kem Brace will show full ROM of the UE in order to return to full functional mobility  1. [] Date:   2. Daisha Kem Brace will show a greater than 10 point improvement on the ACES in order to show an increase in upper extremity function 2. [] Date:   3.  Sondra Mangfernando will report doing hair/bathing without difficulty and <=2/10 pain in order to be independent with ADL's 3.  [] Date:   4.  4.  [] Date:            Outcome Measure: Tool Used: ACES Score   Score:  Initial: 40/55  Most Recent: X/55 (Date: -- )   Interpretation of Score: The DASH is designed to measure the activities of daily living in person's with upper extremity dysfunction or pain. Each section is scored on a 1-5 scale, 5 representing the greatest disability. The scores of each section are added together for a total score of 55. Medical Necessity:   · Skilled intervention continues to be required due to deficits and impairments seen upon initial evaluation affecting patient's participation in ADLs and functional tasks. Reason for Services/Other Comments:  · Patient continues to require skilled intervention due to deficits and impairments seen upon initial evaluation affecting patient's participation in ADLs and functional tasks. Total Treatment Duration:       Rehabilitation Potential For Stated Goals: good  Regarding Javier Zhao's therapy, I certify that the treatment plan above will be carried out by a therapist or under their direction. Thank you for this referral,  Willam Matos DPT     Referring Physician Signature: Camilla Santos MD _______________________________ Date _____________            HISTORY:   History of Present Injury/Illness (Reason for Referral):  *She has no recollection of how she hurt her arm and doesn't know when it happened (poor historian). I was able to review chart and talk with daughter to get hx. Patient is confused and unable to provide where she lives.   \"It's somewhere in [place]"  -Present symptoms/complaints (on day of evaluation)  Pain Scale:  · Current: 0/10 with sitting  · Best: 0/10  · Worst: 7/10    · Aggravating factors: Lifting, Carrying, Overhead activities and washing hair  · Relieving factors: rest by side  · Irritability: Medium (Onset of pain is equal to alleviation)    Dominant Side:  right  Past Medical History/Comorbidities:   Ms. Savanna Cohen  has a past medical history of Broken bones, Diabetes ([facility] [address] Ear problems, Hearing problem, History of mammogram (2015), History of Papanicolaou smear of cervix (>5 years ago), and Hypertension. Ms. Alvaro Gray  has a past surgical history that includes hx orthopaedic (2008); hx colonoscopy (2008); hx cochlear implant (02/21/2017); and hx tubal ligation. Social History/Living Environment:      Prior Level of Function/Work/Activity:  Normal  Other Clinical Tests:         X-RAY Positive for fx  Current Medications:    Current Outpatient Medications:     hydrOXYzine HCL (ATARAX) 50 mg tablet, Take 0.5 Tablets by mouth daily as needed for Itching., Disp: 30 Tablet, Rfl: 1    amLODIPine (NORVASC) 10 mg tablet, Take 1 Tablet by mouth daily. , Disp: , Rfl:     cetirizine (ZYRTEC) 10 mg tablet, Take 10 mg by mouth daily. , Disp: , Rfl:     D-MANNOSE PO, Take 6,060 mg by mouth daily. , Disp: , Rfl:     Flaxseed Oil oil, 1,400 mg by Does Not Apply route daily. , Disp: , Rfl:     memantine (Namenda) 5 mg tablet, Take 1 Tablet by mouth daily. , Disp: 90 Tablet, Rfl: 1    triamcinolone acetonide (KENALOG) 0.1 % topical cream, Apply  to affected area two (2) times a day. use thin layer, Disp: 80 g, Rfl: 5    lancets (One Touch Delica) 33 gauge misc, One Touch Ultra 2 lancets, check fs every day, DM2, E11.9, Disp: 100 Lancet, Rfl: 1        Ambulatory/Rehab Services H2 Model Falls Risk Assessment    Risk Factors:       (5)  History of Recent Falls [w/in 3 months] Ability to Rise from Chair:       (0)  Ability to rise in a single movement    Falls Prevention Plan: Mobility Assistance Device (specify):  rolling walker and supervision   Total: (5 or greater = High Risk): 5    ©2010 [facility] [address] [place] [address] Patent #[id number].  Federal Law prohibits the replication, distribution or use without written permission from [facility] [place]       Date Last Reviewed:  11/8/2021   Number of Personal Factors/Comorbidities that affect the Plan of Care: 3+: HIGH COMPLEXITY   EXAMINATION:   Observation/Orthostatic Postural Assessment: Forward Head and Rounded Shoulders  Palpation:          Minimal to proximal humerus and shaft of humerus (more proximal)*  ROM:    AROM/PROM         Joint: Eval Date: 11/8/2021  Re-Assess Date:  Re-Assess Date:    ACTIVE ROM (standing) RIGHT  Poor posture/kyphotic LEFT RIGHT LEFT RIGHT LEFT   Shoulder Flexion 110 deg sitting on mat 80 deg           Shoulder Abduction 105 deg sitting on mat 75 deg            Shoulder Internal Rotation (Apley's) T9 Buttock - painful           Shoulder External Rotation (Apley's) T1 C7           Elbow ROM             PASSIVE ROM (supine)             Shoulder Flexion  Painful 82 deg           Shoulder Abduction  Painful 77 deg           Shoulder Internal Rotation  Limited-to belly           Shoulder External Rotation  Limited 45 deg                                                    Strength:    Joint: Eval Date: 11/8/2021  Re-Assess Date:  Re-Assess Date:     RIGHT LEFT RIGHT LEFT RIGHT LEFT   Shoulder Flexion 5/5 3/5           Shoulder Abduction  (C5) 5/5 3/5           Shoulder Internal Rotation 5/5 3/5           Shoulder External Rotation 5/5 4-/5           Elbow Flexion  (C6) 5/5 4-/5           Elbow Extension (C7) 5/5 4-/5           Wrist Flexion (C7) 5/5 5/5            Strength  Decreased                                                         Neurological Screen: Assessed @ Initial Visit    Radiating symptoms? Yes/No=poorly localized, glenohumeral region, between neck and shoulder  Functional Mobility:  Assessed @ Initial Visit Limited [place] movement secondary to fx. Body Structures Involved:  1. Bones  2. Joints  3. Muscles  4. Ligaments Body Functions Affected:  1. Sensory/Pain  2. Neuromusculoskeletal  3. Movement Related Activities and Participation Affected:  1. Mobility  2.  Self Care   Number of elements that affect the Plan of Care: 4+: HIGH COMPLEXITY   CLINICAL PRESENTATION:   Presentation: Stable and uncomplicated: LOW COMPLEXITY   CLINICAL DECISION MAKING:      Use of outcome tool(s) and clinical judgement create a POC that gives a: Clear prediction of patient's progress: LOW COMPLEXITY     See associated treatment note for treatment provided today.     Future Appointments   Date Time Provider Diane Yumiko   11/8/2021  9:30 AM Bud Sherman DPT [facility]   12/2/2021 10:30 AM Willie Pate MD [facility] [address], DPT

## 2021-11-15 ENCOUNTER — HOSPITAL ENCOUNTER (OUTPATIENT)
Dept: PHYSICAL THERAPY | Age: 86
Discharge: HOME | End: 2021-11-15
Attending: ORTHOPAEDIC SURGERY
Payer: MEDICARE

## 2021-11-15 PROCEDURE — 97110 THERAPEUTIC EXERCISES: CPT

## 2021-11-15 PROCEDURE — 97140 MANUAL THERAPY 1/> REGIONS: CPT

## 2021-11-15 NOTE — PROGRESS NOTES
Ruslan Orozco Brace  : 1935  Payor: Fran Mccann / Plan: [address] HMO / Product Type: Managed Care Medicare /  [address]  at [facility] Therapy  [address], [facility], [address]  [phone]   [phone]                                                            Willie Pate MD      OUTPATIENT PHYSICAL THERAPY: Daily Treatment Note 11/15/2021 Visit Count:  2    Tx Diagnosis:  Pain in Left Shoulder (M25.512)  Stiffness of Left Shoulder not elsewhere specified (M25.612)  Primary Osteoarthritis of Left Shoulder (M19.012)  Other closed displaced fracture of proximal end of left humerus, initial encounter [S42.292A]       Pre-treatment Symptoms/Complaints: See Initial Eval Dated 21 for more details. Patient reports doing well--she is very confused and wondering why she is here. Pain: Initial:0/10  Medications Last Reviewed:  11/15/2021     Post Session: 0/10   Updated Objective Findings: See Initial Eval for more details. limited arom flexion/scaption/abduction --needs PROM and strengthening        TREATMENT:   THERAPEUTIC EXERCISE: (25 minutes):  Exercises per grid below to improve mobility, strength and balance. Required minimal visual, verbal and manual cues to promote proper body alignment and promote proper body posture. Progressed resistance and complexity of movement as indicated. Note: per MD Note: Full active and full passive range of motion of the left shoulder and light strengthening with up to 15 pounds resistance.      Date:  Eval Date:  11/15 Date:     Activity/Exercise Parameters Parameters Parameters   Education HEP, POC, PT goals, anatomy/pathology     Wall slides      Seated AAROM (flexion, abduction) 10\"x5 ea 10x2 a different angles    Pulleys  5 minutes     AROM seated  10X flexion    Rows  Yellow 10x2 seated    Shrugs  20x          MANUAL THERAPY: (30 minutes): Joint mobilization, Soft tissue mobilization was utilized and necessary because of the patient's restricted joint motion and restricted motion of soft tissue mobility. Date  11/15/2021    Technique Used Grade  Level # Time(s) Effect while being performed   PROM  L GH  Improve ROM                                                          HEP Log Date 1.    11/15/2021   2.  11/15/2021   3. 11/15/2021   4.    5.           [employer] Portal  Treatment/Session Summary:    Response to Treatment: Pt demonstrated understanding of POC and initial HEP. No increase in pain or adverse reactions. Continued ROM and strengthening as seen above. Communication/Consultation:  POC, HEP, PT goals, Faxed initial evaluation to MD.   Equipment provided today: HEP Handout   Recommendations/Intent for next treatment session:   Next visit will focus on Manual Therapy Core Stability Pain Science Education RTC strengthening soft tissue mobilization. Treatment Plan of Care Effective Dates: 11/15/2021 TO 2/6/2022 (90 days).   Frequency/Duration: 2-3 times a week for 90 Days             Total Treatment Billable Duration:  55 minutes   PT Patient Time In/Time Out  Time In: 0902  Time Out: 2215 [facility] Rosamaria Mcginnis DPT    Future Appointments   Date Time Provider Diane Calderon   11/15/2021 10:15 AM Amelia Vargas DPT [facility]   11/22/2021 10:15 [facility]   11/29/2021 10:15 AM Colette Moreland PT [facility]   12/2/2021 10:30 AM Mercedes France MD [facility]   12/6/2021  9:30 AM Margert Dakin [facility]

## 2021-11-17 ENCOUNTER — APPOINTMENT (OUTPATIENT)
Dept: GENERAL RADIOLOGY | Age: 86
DRG: 481 | End: 2021-11-17
Attending: STUDENT IN AN ORGANIZED HEALTH CARE EDUCATION/TRAINING PROGRAM
Payer: MEDICARE

## 2021-11-17 ENCOUNTER — APPOINTMENT (OUTPATIENT)
Dept: GENERAL RADIOLOGY | Age: 86
DRG: 481 | End: 2021-11-17
Attending: ORTHOPAEDIC SURGERY
Payer: MEDICARE

## 2021-11-17 ENCOUNTER — ANESTHESIA EVENT (OUTPATIENT)
Dept: SURGERY | Age: 86
DRG: 481 | End: 2021-11-17
Payer: MEDICARE

## 2021-11-17 ENCOUNTER — ANESTHESIA (OUTPATIENT)
Dept: SURGERY | Age: 86
DRG: 481 | End: 2021-11-17
Payer: MEDICARE

## 2021-11-17 ENCOUNTER — HOSPITAL ENCOUNTER (INPATIENT)
Age: 86
LOS: 6 days | Discharge: NURSING, REHAB OR LONG-TERM CARE | DRG: 481 | End: 2021-11-23
Attending: STUDENT IN AN ORGANIZED HEALTH CARE EDUCATION/TRAINING PROGRAM | Admitting: FAMILY MEDICINE
Payer: MEDICARE

## 2021-11-17 DIAGNOSIS — S72.002A CLOSED FRACTURE OF LEFT HIP, INITIAL ENCOUNTER (HCC): Primary | ICD-10-CM

## 2021-11-17 PROBLEM — E87.6 HYPOKALEMIA: Status: ACTIVE | Noted: 2021-11-17

## 2021-11-17 PROBLEM — S72.142A INTERTROCHANTERIC FRACTURE OF LEFT FEMUR (HCC): Status: ACTIVE | Noted: 2021-11-17

## 2021-11-17 LAB
ABO + RH BLD: NORMAL
ALBUMIN SERPL-MCNC: 3.4 G/DL (ref 3.2–4.6)
ALBUMIN/GLOB SERPL: 0.8 {RATIO} (ref 1.2–3.5)
ALP SERPL-CCNC: 86 U/L (ref 50–136)
ALT SERPL-CCNC: 36 U/L (ref 12–65)
ANION GAP SERPL CALC-SCNC: 3 MMOL/L (ref 7–16)
AST SERPL-CCNC: 91 U/L (ref 15–37)
ATRIAL RATE: 84 BPM
BASOPHILS # BLD: 0 K/UL (ref 0–0.2)
BASOPHILS NFR BLD: 0 % (ref 0–2)
BILIRUB SERPL-MCNC: 0.4 MG/DL (ref 0.2–1.1)
BLOOD GROUP ANTIBODIES SERPL: NORMAL
BUN SERPL-MCNC: 18 MG/DL (ref 8–23)
CALCIUM SERPL-MCNC: 9 MG/DL (ref 8.3–10.4)
CALCULATED P AXIS, ECG09: 83 DEGREES
CALCULATED R AXIS, ECG10: 51 DEGREES
CALCULATED T AXIS, ECG11: 114 DEGREES
CHLORIDE SERPL-SCNC: 109 MMOL/L (ref 98–107)
CO2 SERPL-SCNC: 28 MMOL/L (ref 21–32)
COVID-19 RAPID TEST, COVR: NOT DETECTED
CREAT SERPL-MCNC: 0.86 MG/DL (ref 0.6–1)
DIAGNOSIS, 93000: NORMAL
DIFFERENTIAL METHOD BLD: ABNORMAL
EOSINOPHIL # BLD: 0.1 K/UL (ref 0–0.8)
EOSINOPHIL NFR BLD: 1 % (ref 0.5–7.8)
ERYTHROCYTE [DISTWIDTH] IN BLOOD BY AUTOMATED COUNT: 13.7 % (ref 11.9–14.6)
GLOBULIN SER CALC-MCNC: 4.4 G/DL (ref 2.3–3.5)
GLUCOSE SERPL-MCNC: 166 MG/DL (ref 65–100)
HCT VFR BLD AUTO: 37.8 % (ref 35.8–46.3)
HGB BLD-MCNC: 12.1 G/DL (ref 11.7–15.4)
IMM GRANULOCYTES # BLD AUTO: 0.1 K/UL (ref 0–0.5)
IMM GRANULOCYTES NFR BLD AUTO: 1 % (ref 0–5)
INR PPP: 1
LYMPHOCYTES # BLD: 0.8 K/UL (ref 0.5–4.6)
LYMPHOCYTES NFR BLD: 7 % (ref 13–44)
MAGNESIUM SERPL-MCNC: 1.9 MG/DL (ref 1.8–2.4)
MCH RBC QN AUTO: 30.5 PG (ref 26.1–32.9)
MCHC RBC AUTO-ENTMCNC: 32 G/DL (ref 31.4–35)
MCV RBC AUTO: 95.2 FL (ref 79.6–97.8)
MONOCYTES # BLD: 0.6 K/UL (ref 0.1–1.3)
MONOCYTES NFR BLD: 5 % (ref 4–12)
NEUTS SEG # BLD: 9.9 K/UL (ref 1.7–8.2)
NEUTS SEG NFR BLD: 87 % (ref 43–78)
NRBC # BLD: 0 K/UL (ref 0–0.2)
P-R INTERVAL, ECG05: 152 MS
PLATELET # BLD AUTO: 235 K/UL (ref 150–450)
PMV BLD AUTO: 10.1 FL (ref 9.4–12.3)
POTASSIUM SERPL-SCNC: 2.7 MMOL/L (ref 3.5–5.1)
POTASSIUM SERPL-SCNC: 5.4 MMOL/L (ref 3.5–5.1)
PROT SERPL-MCNC: 7.8 G/DL (ref 6.3–8.2)
PROTHROMBIN TIME: 13 SEC (ref 12.6–14.5)
Q-T INTERVAL, ECG07: 424 MS
QRS DURATION, ECG06: 90 MS
QTC CALCULATION (BEZET), ECG08: 501 MS
RBC # BLD AUTO: 3.97 M/UL (ref 4.05–5.2)
SODIUM SERPL-SCNC: 140 MMOL/L (ref 136–145)
SOURCE, COVRS: NORMAL
SPECIMEN EXP DATE BLD: NORMAL
TROPONIN-HIGH SENSITIVITY: 53.7 PG/ML (ref 0–14)
TROPONIN-HIGH SENSITIVITY: 60.4 PG/ML (ref 0–14)
VENTRICULAR RATE, ECG03: 84 BPM
WBC # BLD AUTO: 11.4 K/UL (ref 4.3–11.1)

## 2021-11-17 PROCEDURE — 85025 COMPLETE CBC W/AUTO DIFF WBC: CPT

## 2021-11-17 PROCEDURE — 65270000029 HC RM PRIVATE

## 2021-11-17 PROCEDURE — 83735 ASSAY OF MAGNESIUM: CPT

## 2021-11-17 PROCEDURE — 96374 THER/PROPH/DIAG INJ IV PUSH: CPT

## 2021-11-17 PROCEDURE — 76010000160 HC OR TIME 0.5 TO 1 HR INTENSV-TIER 1: Performed by: ORTHOPAEDIC SURGERY

## 2021-11-17 PROCEDURE — 74011250636 HC RX REV CODE- 250/636: Performed by: STUDENT IN AN ORGANIZED HEALTH CARE EDUCATION/TRAINING PROGRAM

## 2021-11-17 PROCEDURE — 74011000250 HC RX REV CODE- 250: Performed by: NURSE ANESTHETIST, CERTIFIED REGISTERED

## 2021-11-17 PROCEDURE — 36415 COLL VENOUS BLD VENIPUNCTURE: CPT

## 2021-11-17 PROCEDURE — 84132 ASSAY OF SERUM POTASSIUM: CPT

## 2021-11-17 PROCEDURE — C1713 ANCHOR/SCREW BN/BN,TIS/BN: HCPCS | Performed by: ORTHOPAEDIC SURGERY

## 2021-11-17 PROCEDURE — 84484 ASSAY OF TROPONIN QUANT: CPT

## 2021-11-17 PROCEDURE — 71045 X-RAY EXAM CHEST 1 VIEW: CPT

## 2021-11-17 PROCEDURE — C1769 GUIDE WIRE: HCPCS | Performed by: ORTHOPAEDIC SURGERY

## 2021-11-17 PROCEDURE — 74011000250 HC RX REV CODE- 250: Performed by: FAMILY MEDICINE

## 2021-11-17 PROCEDURE — 77030017016 HC DSG ANTIMIC BARR2 S&N -B: Performed by: ORTHOPAEDIC SURGERY

## 2021-11-17 PROCEDURE — 77030018673: Performed by: ORTHOPAEDIC SURGERY

## 2021-11-17 PROCEDURE — 76060000033 HC ANESTHESIA 1 TO 1.5 HR: Performed by: ORTHOPAEDIC SURGERY

## 2021-11-17 PROCEDURE — 86580 TB INTRADERMAL TEST: CPT | Performed by: FAMILY MEDICINE

## 2021-11-17 PROCEDURE — 77030008846 HC WRE K STRY -C: Performed by: ORTHOPAEDIC SURGERY

## 2021-11-17 PROCEDURE — 77030002933 HC SUT MCRYL J&J -A: Performed by: ORTHOPAEDIC SURGERY

## 2021-11-17 PROCEDURE — 80053 COMPREHEN METABOLIC PANEL: CPT

## 2021-11-17 PROCEDURE — 74011250636 HC RX REV CODE- 250/636: Performed by: ORTHOPAEDIC SURGERY

## 2021-11-17 PROCEDURE — 73552 X-RAY EXAM OF FEMUR 2/>: CPT

## 2021-11-17 PROCEDURE — 27245 TREAT THIGH FRACTURE: CPT | Performed by: ORTHOPAEDIC SURGERY

## 2021-11-17 PROCEDURE — 86901 BLOOD TYPING SEROLOGIC RH(D): CPT

## 2021-11-17 PROCEDURE — 87635 SARS-COV-2 COVID-19 AMP PRB: CPT

## 2021-11-17 PROCEDURE — 74011000250 HC RX REV CODE- 250: Performed by: STUDENT IN AN ORGANIZED HEALTH CARE EDUCATION/TRAINING PROGRAM

## 2021-11-17 PROCEDURE — 74011000272 HC RX REV CODE- 272: Performed by: ORTHOPAEDIC SURGERY

## 2021-11-17 PROCEDURE — 2709999900 HC NON-CHARGEABLE SUPPLY

## 2021-11-17 PROCEDURE — 74011250636 HC RX REV CODE- 250/636: Performed by: NURSE ANESTHETIST, CERTIFIED REGISTERED

## 2021-11-17 PROCEDURE — 76210000006 HC OR PH I REC 0.5 TO 1 HR: Performed by: ORTHOPAEDIC SURGERY

## 2021-11-17 PROCEDURE — 74011250636 HC RX REV CODE- 250/636: Performed by: FAMILY MEDICINE

## 2021-11-17 PROCEDURE — 77030016449 HC BIT DRL AO1 STRY -C: Performed by: ORTHOPAEDIC SURGERY

## 2021-11-17 PROCEDURE — 73502 X-RAY EXAM HIP UNI 2-3 VIEWS: CPT

## 2021-11-17 PROCEDURE — 0QS706Z REPOSITION LEFT UPPER FEMUR WITH INTRAMEDULLARY INTERNAL FIXATION DEVICE, OPEN APPROACH: ICD-10-PCS | Performed by: ORTHOPAEDIC SURGERY

## 2021-11-17 PROCEDURE — 77030021107 HC SHFT RMR MOD DISP STRY -D: Performed by: ORTHOPAEDIC SURGERY

## 2021-11-17 PROCEDURE — 74011250636 HC RX REV CODE- 250/636: Performed by: ANESTHESIOLOGY

## 2021-11-17 PROCEDURE — 85610 PROTHROMBIN TIME: CPT

## 2021-11-17 PROCEDURE — 2709999900 HC NON-CHARGEABLE SUPPLY: Performed by: ORTHOPAEDIC SURGERY

## 2021-11-17 PROCEDURE — 93005 ELECTROCARDIOGRAM TRACING: CPT | Performed by: STUDENT IN AN ORGANIZED HEALTH CARE EDUCATION/TRAINING PROGRAM

## 2021-11-17 PROCEDURE — 77030010509 HC AIRWY LMA MSK TELE -A: Performed by: STUDENT IN AN ORGANIZED HEALTH CARE EDUCATION/TRAINING PROGRAM

## 2021-11-17 PROCEDURE — 74011000258 HC RX REV CODE- 258: Performed by: ORTHOPAEDIC SURGERY

## 2021-11-17 PROCEDURE — 77030040922 HC BLNKT HYPOTHRM STRY -A: Performed by: STUDENT IN AN ORGANIZED HEALTH CARE EDUCATION/TRAINING PROGRAM

## 2021-11-17 PROCEDURE — 99232 SBSQ HOSP IP/OBS MODERATE 35: CPT | Performed by: ORTHOPAEDIC SURGERY

## 2021-11-17 PROCEDURE — 99284 EMERGENCY DEPT VISIT MOD MDM: CPT

## 2021-11-17 DEVICE — LONG NAIL KIT R1.5, TI, LEFT
Type: IMPLANTABLE DEVICE | Site: HIP | Status: FUNCTIONAL
Brand: GAMMA

## 2021-11-17 DEVICE — LOCKING SCREW, FULLY THREADED: Type: IMPLANTABLE DEVICE | Site: HIP | Status: FUNCTIONAL

## 2021-11-17 DEVICE — LAG SCREW, TI
Type: IMPLANTABLE DEVICE | Site: HIP | Status: FUNCTIONAL
Brand: GAMMA

## 2021-11-17 RX ORDER — ASPIRIN 325 MG
325 TABLET, DELAYED RELEASE (ENTERIC COATED) ORAL DAILY
Status: DISCONTINUED | OUTPATIENT
Start: 2021-11-18 | End: 2021-11-23 | Stop reason: HOSPADM

## 2021-11-17 RX ORDER — SODIUM CHLORIDE, SODIUM LACTATE, POTASSIUM CHLORIDE, CALCIUM CHLORIDE 600; 310; 30; 20 MG/100ML; MG/100ML; MG/100ML; MG/100ML
100 INJECTION, SOLUTION INTRAVENOUS CONTINUOUS
Status: DISCONTINUED | OUTPATIENT
Start: 2021-11-17 | End: 2021-11-17 | Stop reason: HOSPADM

## 2021-11-17 RX ORDER — FENTANYL CITRATE 50 UG/ML
50 INJECTION, SOLUTION INTRAMUSCULAR; INTRAVENOUS
Status: COMPLETED | OUTPATIENT
Start: 2021-11-17 | End: 2021-11-17

## 2021-11-17 RX ORDER — SODIUM CHLORIDE 0.9 % (FLUSH) 0.9 %
5-40 SYRINGE (ML) INJECTION AS NEEDED
Status: DISCONTINUED | OUTPATIENT
Start: 2021-11-17 | End: 2021-11-23 | Stop reason: HOSPADM

## 2021-11-17 RX ORDER — SODIUM CHLORIDE, SODIUM LACTATE, POTASSIUM CHLORIDE, CALCIUM CHLORIDE 600; 310; 30; 20 MG/100ML; MG/100ML; MG/100ML; MG/100ML
75 INJECTION, SOLUTION INTRAVENOUS CONTINUOUS
Status: DISCONTINUED | OUTPATIENT
Start: 2021-11-17 | End: 2021-11-19

## 2021-11-17 RX ORDER — CEFAZOLIN SODIUM/WATER 2 G/20 ML
2 SYRINGE (ML) INTRAVENOUS
Status: COMPLETED | OUTPATIENT
Start: 2021-11-17 | End: 2021-11-17

## 2021-11-17 RX ORDER — OXYCODONE HYDROCHLORIDE 5 MG/1
5 TABLET ORAL
Status: DISCONTINUED | OUTPATIENT
Start: 2021-11-17 | End: 2021-11-17 | Stop reason: HOSPADM

## 2021-11-17 RX ORDER — HYDROMORPHONE HYDROCHLORIDE 2 MG/ML
0.5 INJECTION, SOLUTION INTRAMUSCULAR; INTRAVENOUS; SUBCUTANEOUS
Status: DISCONTINUED | OUTPATIENT
Start: 2021-11-17 | End: 2021-11-17 | Stop reason: HOSPADM

## 2021-11-17 RX ORDER — ACETAMINOPHEN 325 MG/1
650 TABLET ORAL
Status: DISCONTINUED | OUTPATIENT
Start: 2021-11-17 | End: 2021-11-23 | Stop reason: HOSPADM

## 2021-11-17 RX ORDER — LABETALOL HYDROCHLORIDE 5 MG/ML
10 INJECTION, SOLUTION INTRAVENOUS
Status: DISCONTINUED | OUTPATIENT
Start: 2021-11-17 | End: 2021-11-19

## 2021-11-17 RX ORDER — MAG HYDROX/ALUMINUM HYD/SIMETH 200-200-20
30 SUSPENSION, ORAL (FINAL DOSE FORM) ORAL
Status: DISCONTINUED | OUTPATIENT
Start: 2021-11-17 | End: 2021-11-23 | Stop reason: HOSPADM

## 2021-11-17 RX ORDER — FLUMAZENIL 0.1 MG/ML
0.2 INJECTION INTRAVENOUS
Status: DISCONTINUED | OUTPATIENT
Start: 2021-11-17 | End: 2021-11-17 | Stop reason: HOSPADM

## 2021-11-17 RX ORDER — SODIUM CHLORIDE 0.9 G/100ML
IRRIGANT IRRIGATION AS NEEDED
Status: DISCONTINUED | OUTPATIENT
Start: 2021-11-17 | End: 2021-11-17 | Stop reason: HOSPADM

## 2021-11-17 RX ORDER — AMLODIPINE BESYLATE 10 MG/1
10 TABLET ORAL DAILY
Status: DISCONTINUED | OUTPATIENT
Start: 2021-11-18 | End: 2021-11-23 | Stop reason: HOSPADM

## 2021-11-17 RX ORDER — POTASSIUM CHLORIDE 14.9 MG/ML
20 INJECTION INTRAVENOUS
Status: COMPLETED | OUTPATIENT
Start: 2021-11-17 | End: 2021-11-17

## 2021-11-17 RX ORDER — SODIUM CHLORIDE 0.9 % (FLUSH) 0.9 %
5-40 SYRINGE (ML) INJECTION EVERY 8 HOURS
Status: DISCONTINUED | OUTPATIENT
Start: 2021-11-17 | End: 2021-11-17 | Stop reason: HOSPADM

## 2021-11-17 RX ORDER — DEXAMETHASONE SODIUM PHOSPHATE 4 MG/ML
INJECTION, SOLUTION INTRA-ARTICULAR; INTRALESIONAL; INTRAMUSCULAR; INTRAVENOUS; SOFT TISSUE AS NEEDED
Status: DISCONTINUED | OUTPATIENT
Start: 2021-11-17 | End: 2021-11-17 | Stop reason: HOSPADM

## 2021-11-17 RX ORDER — ONDANSETRON 4 MG/1
4 TABLET, ORALLY DISINTEGRATING ORAL
Status: DISCONTINUED | OUTPATIENT
Start: 2021-11-17 | End: 2021-11-23 | Stop reason: HOSPADM

## 2021-11-17 RX ORDER — EPHEDRINE SULFATE/0.9% NACL/PF 50 MG/5 ML
SYRINGE (ML) INTRAVENOUS AS NEEDED
Status: DISCONTINUED | OUTPATIENT
Start: 2021-11-17 | End: 2021-11-17 | Stop reason: HOSPADM

## 2021-11-17 RX ORDER — ONDANSETRON 2 MG/ML
INJECTION INTRAMUSCULAR; INTRAVENOUS AS NEEDED
Status: DISCONTINUED | OUTPATIENT
Start: 2021-11-17 | End: 2021-11-17

## 2021-11-17 RX ORDER — NALOXONE HYDROCHLORIDE 0.4 MG/ML
0.1 INJECTION, SOLUTION INTRAMUSCULAR; INTRAVENOUS; SUBCUTANEOUS AS NEEDED
Status: DISCONTINUED | OUTPATIENT
Start: 2021-11-17 | End: 2021-11-17 | Stop reason: HOSPADM

## 2021-11-17 RX ORDER — ONDANSETRON 2 MG/ML
4 INJECTION INTRAMUSCULAR; INTRAVENOUS
Status: DISCONTINUED | OUTPATIENT
Start: 2021-11-17 | End: 2021-11-23 | Stop reason: HOSPADM

## 2021-11-17 RX ORDER — ACETAMINOPHEN 650 MG/1
650 SUPPOSITORY RECTAL
Status: DISCONTINUED | OUTPATIENT
Start: 2021-11-17 | End: 2021-11-23 | Stop reason: HOSPADM

## 2021-11-17 RX ORDER — PROPOFOL 10 MG/ML
INJECTION, EMULSION INTRAVENOUS AS NEEDED
Status: DISCONTINUED | OUTPATIENT
Start: 2021-11-17 | End: 2021-11-17 | Stop reason: HOSPADM

## 2021-11-17 RX ORDER — HYDROCODONE BITARTRATE AND ACETAMINOPHEN 7.5; 325 MG/1; MG/1
1 TABLET ORAL
Status: DISCONTINUED | OUTPATIENT
Start: 2021-11-17 | End: 2021-11-23 | Stop reason: HOSPADM

## 2021-11-17 RX ORDER — HYDROXYZINE 25 MG/1
25 TABLET, FILM COATED ORAL
Status: DISCONTINUED | OUTPATIENT
Start: 2021-11-17 | End: 2021-11-19

## 2021-11-17 RX ORDER — MEMANTINE HYDROCHLORIDE 5 MG/1
5 TABLET ORAL DAILY
Status: DISCONTINUED | OUTPATIENT
Start: 2021-11-18 | End: 2021-11-23 | Stop reason: HOSPADM

## 2021-11-17 RX ORDER — SODIUM CHLORIDE 0.9 % (FLUSH) 0.9 %
5-40 SYRINGE (ML) INJECTION AS NEEDED
Status: DISCONTINUED | OUTPATIENT
Start: 2021-11-17 | End: 2021-11-17 | Stop reason: HOSPADM

## 2021-11-17 RX ORDER — DIPHENHYDRAMINE HYDROCHLORIDE 50 MG/ML
12.5 INJECTION, SOLUTION INTRAMUSCULAR; INTRAVENOUS
Status: DISCONTINUED | OUTPATIENT
Start: 2021-11-17 | End: 2021-11-17 | Stop reason: HOSPADM

## 2021-11-17 RX ORDER — SODIUM CHLORIDE 0.9 % (FLUSH) 0.9 %
5-40 SYRINGE (ML) INJECTION EVERY 8 HOURS
Status: DISCONTINUED | OUTPATIENT
Start: 2021-11-17 | End: 2021-11-23 | Stop reason: HOSPADM

## 2021-11-17 RX ORDER — FENTANYL CITRATE 50 UG/ML
INJECTION, SOLUTION INTRAMUSCULAR; INTRAVENOUS AS NEEDED
Status: DISCONTINUED | OUTPATIENT
Start: 2021-11-17 | End: 2021-11-17 | Stop reason: HOSPADM

## 2021-11-17 RX ORDER — FERROUS SULFATE, DRIED 160(50) MG
1 TABLET, EXTENDED RELEASE ORAL
Status: DISCONTINUED | OUTPATIENT
Start: 2021-11-17 | End: 2021-11-23 | Stop reason: HOSPADM

## 2021-11-17 RX ORDER — LIDOCAINE HYDROCHLORIDE 20 MG/ML
INJECTION, SOLUTION EPIDURAL; INFILTRATION; INTRACAUDAL; PERINEURAL AS NEEDED
Status: DISCONTINUED | OUTPATIENT
Start: 2021-11-17 | End: 2021-11-17 | Stop reason: HOSPADM

## 2021-11-17 RX ORDER — POLYETHYLENE GLYCOL 3350 17 G/17G
17 POWDER, FOR SOLUTION ORAL DAILY PRN
Status: DISCONTINUED | OUTPATIENT
Start: 2021-11-17 | End: 2021-11-23 | Stop reason: HOSPADM

## 2021-11-17 RX ORDER — HYDROMORPHONE HYDROCHLORIDE 1 MG/ML
0.2 INJECTION, SOLUTION INTRAMUSCULAR; INTRAVENOUS; SUBCUTANEOUS
Status: DISCONTINUED | OUTPATIENT
Start: 2021-11-17 | End: 2021-11-19

## 2021-11-17 RX ADMIN — POTASSIUM CHLORIDE 20 MEQ: 14.9 INJECTION, SOLUTION INTRAVENOUS at 13:31

## 2021-11-17 RX ADMIN — FENTANYL CITRATE 50 MCG: 50 INJECTION INTRAMUSCULAR; INTRAVENOUS at 14:14

## 2021-11-17 RX ADMIN — SODIUM CHLORIDE, SODIUM LACTATE, POTASSIUM CHLORIDE, AND CALCIUM CHLORIDE 75 ML/HR: 600; 310; 30; 20 INJECTION, SOLUTION INTRAVENOUS at 12:55

## 2021-11-17 RX ADMIN — Medication 5 ML: at 17:54

## 2021-11-17 RX ADMIN — PROPOFOL 30 MG: 10 INJECTION, EMULSION INTRAVENOUS at 13:59

## 2021-11-17 RX ADMIN — HYDROMORPHONE HYDROCHLORIDE 0.5 MG: 2 INJECTION, SOLUTION INTRAMUSCULAR; INTRAVENOUS; SUBCUTANEOUS at 15:07

## 2021-11-17 RX ADMIN — LIDOCAINE HYDROCHLORIDE 100 MG: 20 INJECTION, SOLUTION EPIDURAL; INFILTRATION; INTRACAUDAL; PERINEURAL at 13:56

## 2021-11-17 RX ADMIN — Medication 10 ML: at 21:59

## 2021-11-17 RX ADMIN — TUBERCULIN PURIFIED PROTEIN DERIVATIVE 5 UNITS: 5 INJECTION, SOLUTION INTRADERMAL at 17:56

## 2021-11-17 RX ADMIN — CEFAZOLIN 2 G: 1 INJECTION, POWDER, FOR SOLUTION INTRAVENOUS at 13:59

## 2021-11-17 RX ADMIN — PROPOFOL 90 MG: 10 INJECTION, EMULSION INTRAVENOUS at 13:56

## 2021-11-17 RX ADMIN — ONDANSETRON 4 MG: 2 INJECTION INTRAMUSCULAR; INTRAVENOUS at 14:15

## 2021-11-17 RX ADMIN — DEXAMETHASONE SODIUM PHOSPHATE 4 MG: 4 INJECTION, SOLUTION INTRAMUSCULAR; INTRAVENOUS at 14:15

## 2021-11-17 RX ADMIN — Medication 15 MG: at 14:26

## 2021-11-17 RX ADMIN — POTASSIUM CHLORIDE 20 MEQ: 14.9 INJECTION, SOLUTION INTRAVENOUS at 14:27

## 2021-11-17 RX ADMIN — HYDROMORPHONE HYDROCHLORIDE 0.5 MG: 2 INJECTION, SOLUTION INTRAMUSCULAR; INTRAVENOUS; SUBCUTANEOUS at 15:15

## 2021-11-17 RX ADMIN — ONDANSETRON 4 MG: 2 INJECTION INTRAMUSCULAR; INTRAVENOUS at 18:02

## 2021-11-17 RX ADMIN — FENTANYL CITRATE 50 MCG: 50 INJECTION, SOLUTION INTRAMUSCULAR; INTRAVENOUS at 09:12

## 2021-11-17 RX ADMIN — FENTANYL CITRATE 50 MCG: 50 INJECTION INTRAMUSCULAR; INTRAVENOUS at 13:58

## 2021-11-17 RX ADMIN — CEFAZOLIN SODIUM 1 G: 1 INJECTION, POWDER, FOR SOLUTION INTRAMUSCULAR; INTRAVENOUS at 21:59

## 2021-11-17 NOTE — CONSULTS
Consult    Patient: Juliana Seth MRN: [id number]  SSN: [id number]    YOB: 1935  Age: 80 y.o. Sex: female      Subjective:      Juliana Seth is a 80 y.o. female who fell and injured her left hip. She was seen in the emergency room and found to have a completely displaced left peritrochanteric proximal femur fracture. She has a history of dementia herself and she is rather hard of hearing so is very difficult to get much history from her. About all that I can ascertain is that she does seem to hurt in her left hip area. She is familiar to me and I have seen her in the past few weeks for a left proximal humerus fracture that was essentially nondisplaced and this seems to be doing well. She was seen most recently just a couple weeks ago. Now this is a new injury after a fall today. After reviewing her x-rays and talking to her and her daughter I have explained to them that I do think this needs to be treated operatively with intramedullary nail fixation of tried explained to them exactly what this would involve. Past Medical History:   Diagnosis Date    Broken bones     Diabetes ([address]     Ear problems     Hearing problem     History of mammogram 2015    History of Papanicolaou smear of cervix >5 years ago    Hypertension      Past Surgical History:   Procedure Laterality Date    [facility] - BATH COCHLEAR IMPLANT  02/21/2017    [place], [place] HX COLONOSCOPY  2008   Marin Leyden ORTHOPAEDIC  2008    lumbar laminectomy;  [address] [place], [place]    HX TUBAL LIGATION        FAM[facility]X -No history of inflammatory arthritis   Social History     Tobacco Use    Smoking status: Never Smoker    Smokeless tobacco: Never Used   Substance Use Topics    Alcohol use: No      Current Facility-Administered Medications   Medication Dose Route Frequency Provider Last Rate Last Admin    tuberculin injection 5 Units  5 Units IntraDERMal Nicol Hooper MD         Current Outpatient Medications   Medication Sig Dispense Refill    hydrOXYzine HCL (ATARAX) 50 mg tablet Take 0.5 Tablets by mouth daily as needed for Itching. 30 Tablet 1    amLODIPine (NORVASC) 10 mg tablet Take 1 Tablet by mouth daily.  cetirizine (ZYRTEC) 10 mg tablet Take 10 mg by mouth daily.  D-MANNOSE PO Take 6,060 mg by mouth daily.  Flaxseed Oil oil 1,400 mg by Does Not Apply route daily.  memantine (Namenda) 5 mg tablet Take 1 Tablet by mouth daily. 90 Tablet 1    triamcinolone acetonide (KENALOG) 0.1 % topical cream Apply  to affected area two (2) times a day. use thin layer 80 g 5    lancets (One Touch Delica) 33 gauge misc One Touch Ultra 2 lancets, check fs every day, DM2, E11.9 100 Lancet 1        Allergies   Allergen Reactions    Aricept [Donepezil] Itching       Review of Systems:  A comprehensive review of systems was negative except for that written in the History of Present Illness. Objective:     Vitals:    11/17/21 0808 11/17/21 1032   BP: (!) 175/83 (!) 196/84   Pulse: 98 96   Resp: 20 23   Temp: 98 °F (36.7 °C)    SpO2: 94% 98%   Weight: 63.5 kg (140 lb)    Height: 5' 5\" (1.651 m)         Physical Exam:  Physical Exam:  General:  Alert, cooperative, no distress, appears stated age. Orientation she is alert and oriented at least to person   Eyes:  Conjunctivae/corneas clear. PERRL, EOMs intact. Fundi benign   Ears:  Normal TMs and external ear canals both ears. Nose: Nares normal. Septum midline. Mucosa normal. No drainage or sinus tenderness. Mouth/Throat: Lips, mucosa, and tongue normal. Teeth and gums normal.   Neck: Supple, symmetrical, trachea midline, no adenopathy, thyroid: no enlargment/tenderness/nodules, no carotid bruit and no JVD. Back:   Symmetric, no curvature. ROM normal. No CVA tenderness. Lungs:   Clear to auscultation bilaterally. Heart:  Regular rate and rhythm, S1, S2 normal, no murmur, click, rub or gallop. Abdomen:   Soft, non-tender.  Bowel sounds normal. No masses,  No organomegaly. No lymphadenopathy in all 4 extremities  Alignment- pt has some obvious deformity of the left hip  Range of motion- with any range of motion leftt hip  Vascular-distal pulses palpable in left lower extremity  Sensory/motor-deep tendon reflexes normal left lower extremity. Motor and sensory function intact. 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 With the degree of overlap she has is very difficult to tell but it appears to involve the subtrochanteric area of the femur as well as the intertrochanteric area of the femur has a significant of comminution. Plan:     The overall plan will be to treat this with operative intervention. I talked with the patient and her daughter both about what the primary plan would be which would be for us to treat this with cephalomedullary nail fixation. I think this would be in her best interest I am hopeful that this will be the case. I have tried explained to them that if we are unable to get a satisfactory reduction we may have to resort to some sort of more open reduction and then internal fixation and the most significant of which would be plate and screw fixation versus adrianne fixation.   I try to make sure that her daughter understands the main issue with this would be that she would just have a little bit larger surgery is a little bit larger risk for infection or wound healing problems and little bit more blood loss. Hopefully this will not be the case and will be able to treat this with intramedullary nail fixation and limit her surgical incisions as well as limit her blood loss and her risk associated with all of this. After talking to her about this she seems to feel comfortable.   The plan will be to proceed with open treatment of left proximal femur fracture with intramedullary nail fixation today in the operating room    Signed By: Lian Cuello MD

## 2021-11-17 NOTE — PROGRESS NOTES
Chart review complete, CM attempted to meet with pt but per nursing pt is confused, CM opted to contact daughter Tracey Sparrow to complete initial assessment. Per daughter pt lives at [facility], states she is normally independent with ADLS and uses a walker when ambulating in facility. Demographics, insurance and PCP confirmed with latisha. Pt is pending surgery today, daughter made aware of pending surgery time at 145 pm  CM made daughter aware CM staff will follow up with pt/family about STR post op to assist with dc planning. Pt will need PPD placement and PT/OT evaluations post op. CM will remain available to assist as needed. Care Management Interventions  PCP Verified by CM:  Yes (Dr Doug Desir-- last visit in office was in August 2021)  Transition of Care Consult (CM Consult): SNF  MyChart Signup: No  Discharge Durable Medical Equipment: No  Physical Therapy Consult: No  Occupational Therapy Consult: No  Speech Therapy Consult: No  Support Systems: Child(justin) (daughter Dejuan Rang [phone])  Confirm Follow Up Transport: Family  Discharge Location  Discharge Placement: Skilled nursing facility

## 2021-11-17 NOTE — PROGRESS NOTES
Consult Received:       Closed left hip intertrochanteric fracture. Patient discussed with Dr. Dwaine Taylor. Will tentatively plan for surgery today. Hospitalist to admit and clear for surgery. NPO. Hold blood thinners.

## 2021-11-17 NOTE — ED TRIAGE NOTES
Pt arrives via EMS from [facility] after a dizzy fall. Pt presents with shortening and rotation to the left hip. Hard of hearing. No LOC or hit head. No blood thinners. EMS gave 100 fent. 166/63. Hr 80s. bgl 286. 18 RR. 95% RA. A/ox4.

## 2021-11-17 NOTE — ACP (ADVANCE CARE PLANNING)
Daughter Galina Marks is primary decision maker for pt, per daughter pt has HCPOA and LW and she is bringing those in for scanning.

## 2021-11-17 NOTE — ANESTHESIA PREPROCEDURE EVALUATION
Relevant Problems   CARDIOVASCULAR   (+) Essential hypertension      ENDOCRINE   (+) Type 2 diabetes mellitus without complication, without long-term current use of insulin (HCC)       Anesthetic History   No history of anesthetic complications            Review of Systems / Medical History  Patient summary reviewed, nursing notes reviewed and pertinent labs reviewed    Pulmonary  Within defined limits                 Neuro/Psych   Within defined limits           Cardiovascular    Hypertension: well controlled              Exercise tolerance: <4 METS: Ambulates with walker     GI/Hepatic/Renal  Within defined limits              Endo/Other    Diabetes: well controlled, type 2         Other Findings   Comments: Hypokalemia - being repleted           Physical Exam    Airway  Mallampati: II  TM Distance: 4 - 6 cm  Neck ROM: normal range of motion   Mouth opening: Normal     Cardiovascular  Regular rate and rhythm,  S1 and S2 normal,  no murmur, click, rub, or gallop             Dental    Dentition: Edentulous     Pulmonary  Breath sounds clear to auscultation               Abdominal         Other Findings            Anesthetic Plan    ASA: 3, emergent  Anesthesia type: general          Induction: Intravenous  Anesthetic plan and risks discussed with: Patient and Family      Discussed suspension of DNR intraop. Patient and family at bedside understand risks/benefits procedure ok to proceed.

## 2021-11-17 NOTE — ED PROVIDER NOTES
59-year-old female presents to the emergency department complaining of a fall at her nursing facility that occurred a few hours prior to arrival.  Complains of left-sided hip pain. Denies hitting her head nor loss of consciousness. Reports normal sensation in all extremities. Denies any other pain or injuries. States she was transferring to use a bedside commode and reports she lost her balance causing her to fall. States she fell directly onto her left hip but again did not strike her head nor lose consciousness. Patient only reports pain to the left hip with unable to range the left lower extremity secondary to pain. Patient did receive fentanyl x2 in route with EMS. Past Medical History:   Diagnosis Date    Broken bones     Diabetes ([address]     Ear problems     Hearing problem     History of mammogram 2015    History of Papanicolaou smear of cervix >5 years ago    Hypertension        Past Surgical History:   Procedure Laterality Date    Robina COCHLEAR IMPLANT  02/21/2017    [place], [place] HX COLONOSCOPY  2008   [facility]  2008    lumbar laminectomy;  [place], [place]    HX TUBAL LIGATION           Family History:   Problem Relation Age of Onset    Stroke Mother     Colon Cancer Sister     Breast Cancer Sister     Heart Disease Maternal Grandmother        Social History     Socioeconomic History    Marital status:      Spouse name: Not on file    Number of children: 11    Years of education: Not on file    Highest education level: Not on file   Occupational History    Occupation: retired, manufacturing, fork    Tobacco Use    Smoking status: Never Smoker    Smokeless tobacco: Never Used   Vaping Use    Vaping Use: Never used   Substance and Sexual Activity    Alcohol use: No    Drug use: No    Sexual activity: Not on file   Other Topics Concern    Not on file   Social History Narrative    Not on file     Social Determinants of Health Financial Resource Strain:     Difficulty of Paying Living Expenses: Not on file   Food Insecurity:     Worried About Running Out of Food in the Last Year: Not on file    Joni of Food in the Last Year: Not on file   Transportation Needs:     Lack of Transportation (Medical): Not on file    Lack of Transportation (Non-Medical): Not on file   Physical Activity:     Days of Exercise per Week: Not on file    Minutes of Exercise per Session: Not on file   Stress:     Feeling of Stress : Not on file   Social Connections:     Frequency of Communication with Friends and Family: Not on file    Frequency of Social Gatherings with Friends and Family: Not on file    Attends Restoration Services: Not on file    Active Member of [address] [place] [address] or Organizations: Not on file    Attends Club or Organization Meetings: Not on file    Marital Status: Not on file   Intimate Partner Violence:     Fear of Current or Ex-Partner: Not on file    Emotionally Abused: Not on file    Physically Abused: Not on file    Sexually Abused: Not on file   Housing Stability:     Unable to Pay for Housing in the Last Year: Not on file    Number of [place] in the Last Year: Not on file    Unstable Housing in the Last Year: Not on file         ALLERGIES: Aricept [donepezil]    Review of Systems   Constitutional: Negative for chills and fever. HENT: Negative for sinus pressure and sore throat. Eyes: Negative for visual disturbance. Respiratory: Negative for cough and shortness of breath. Cardiovascular: Negative for chest pain. Gastrointestinal: Negative for abdominal pain, diarrhea, nausea and vomiting. Endocrine: Negative for polyuria. Genitourinary: Negative for difficulty urinating and dysuria. Musculoskeletal: Positive for arthralgias and joint swelling. Negative for neck pain and neck stiffness. Skin: Negative for rash. Neurological: Negative for weakness and headaches.    Psychiatric/Behavioral: Negative for confusion. All other systems reviewed and are negative. Vitals:    11/17/21 0808   BP: (!) 175/83   Pulse: 98   Resp: 20   Temp: 98 °F (36.7 °C)   SpO2: 94%   Weight: 63.5 kg (140 lb)   Height: 5' 5\" (1.651 m)            Physical Exam  Vitals and nursing note reviewed. Constitutional:       Appearance: Normal appearance. She is not ill-appearing or toxic-appearing. HENT:      Head: Normocephalic and atraumatic. Nose: Nose normal.      Mouth/Throat:      Mouth: Mucous membranes are moist.   Eyes:      Extraocular Movements: Extraocular movements intact. Pupils: Pupils are equal, round, and reactive to light. Cardiovascular:      Rate and Rhythm: Normal rate and regular rhythm. Pulses: Normal pulses. Heart sounds: Normal heart sounds. Pulmonary:      Effort: Pulmonary effort is normal. No respiratory distress. Breath sounds: Normal breath sounds. Abdominal:      General: Abdomen is flat. There is no distension. Palpations: Abdomen is soft. Tenderness: There is no abdominal tenderness. Musculoskeletal:         General: Tenderness present. Cervical back: Normal range of motion. No rigidity. Comments: Left hip: Decreased range of motion to left lower extremity secondary to pain left hip. DP and PT pulses present and equal bilaterally. No pain to the knee or distal lower extremity. Left hip shortened and externally rotated. Skin:     General: Skin is warm and dry. Neurological:      General: No focal deficit present. Mental Status: She is alert. Cranial Nerves: No cranial nerve deficit. Sensory: No sensory deficit. Motor: No weakness. Psychiatric:         Mood and Affect: Mood normal.          MDM  Number of Diagnoses or Management Options  Closed fracture of left hip, initial encounter [facility])  Diagnosis management comments: 80-year-old female presents to the emergency department after a fall. Patient denies LOC nor head injury. Complains of pain left hip. Will obtain labs and imaging. Will give fentanyl for pain. Labs show no emergent findings, normal white count, stable H&H, Potassium is 5.4, normal kidney function, will repeat potassium level. INR normal.  X-ray shows intertrochanteric left hip fracture. Orthopedics made aware. Hospitalist consulted for admission, hospitalist and patient voiced understanding and agreement. EKG interpretation, normal sinus rhythm, rate 84, , QRS 90, QTc 501, prolonged QTC, nonspecific ST segment changes in V4 through V6, no significant ST elevation.        Amount and/or Complexity of Data Reviewed  Clinical lab tests: ordered and reviewed  Tests in the radiology section of CPT®: ordered  Discussion of test results with the performing providers: yes  Discuss the patient with other providers: yes  Independent visualization of images, tracings, or specimens: yes    Risk of Complications, Morbidity, and/or Mortality  Presenting problems: moderate  Diagnostic procedures: moderate  Management options: moderate           Procedures

## 2021-11-17 NOTE — OP NOTES
Operative Report    Patient: Wilhelmina Libman MRN: [id number]  SSN: [id number]    YOB: 1935  Age: 80 y.o. Sex: female       Date of Surgery: 11/17/2021     History:  Wilhelmina Libman is a 80 y.o. female who fell and injured her left hip. She is not able to really give us a lot of history herself. We believe she fell from a standing height. She was seen emergency room and found to have a very comminuted peritrochanteric left proximal femur fracture. I did have a chance to talk to the patient herself and to her daughter regarding the nature of her injury as well as the nature of the surgical intervention. I use illustrations to help him understand exactly what the intramedullary nail device would be like and exactly what the procedure would be to place this in the left femur. After talking to both of them the daughter seem to feel comfortable consenting. I talked to the patient and/or their representative and explained the exact nature the procedure. I also went through a detailed list of the material risks associated with  the procedure which included risk of bleeding, infection, injury to nearby structures, worsening the situation, as well as the risks associate with anesthesia and finally death. Also talked with him regarding the benefits and alternatives to the procedure.     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 I then prepped and draped the left hip and thigh area and made a small incision just proximal to the area the greater trochanter and placed the cannulated awl into the tip the greater trochanter on both the AP and lateral projection and once it was in appropriate position placed a guidewire down the shaft of the femur and then reamed sequentially up to 13 mm for the shaft of the femur and to 15.5 mm proximally. I then measured for a 380 mm nail and placed the actual nail. Once the nail was in appropriate position I placed a guidewire in the center of the femoral head on both the AP and lateral projection. Once this was in appropriate position I drilled for and placed a 90 mm lag screw proximally. Once this was in position I then placed a set screw in the proximal portion of the nail and tightened this all the way down and backed off a half of a turn to allow for dynamic compression. I then placed a single interlock bolt distally using freehand technique. I then removed the insertion handle and checked the final reduction as well as the placement of the hardware. I was very pleased with this I then closed incisions with 2.0 Monocryl for the subcutaneous tissue and staples for the skin. Dressings were applied. The patient was awakened and taken recovery in stable condition there were no apparent complications      Estimated Blood Loss: 90 cc    Tourniquet Time: * No tourniquets in log *      Implants:   Implant Name Type Inv. Item Serial No.  Lot No. LRB No. Used Action   NAIL KT LNG [id number] 125D LT -- IM KIT STRL GAMMA 3 - [id number]  NAIL KT LNG [id number] 125D LT -- IM KIT STRL GAMMA 3  [employer] [facility] [id number] Left 1 Implanted   SCR BNE LAG GAMMA 3 10.5X90MM -- TI STRL - [id number]  SCR BNE LAG GAMMA 3 10.5X90MM -- TI STRL  [employer] [facility] [id number] Left 1 Implanted   SCR BNE LCK T2 FT 5X57. 5MM TI -- STRL - [id number]  SCR BNE LCK T2 FT 5X57. 5MM TI -- STRL [employer] HOW_WD [id number] Left 1 Implanted               Specimens: * No specimens in log *        Drains: None                Complications: None    Counts: Sponge and needle counts were correct times two.     Signed By:  Satnam Vieyra MD     November 17, 2021

## 2021-11-17 NOTE — PERIOP NOTES
TRANSFER - OUT REPORT:    Verbal report given to Maria Esther Arreola RN on [employer]  being transferred to [phone] for routine post - op       Report consisted of patients Situation, Background, Assessment and   Recommendations(SBAR). Information from the following report(s) SBAR, OR Summary, MAR and Cardiac Rhythm nsr was reviewed with the receiving nurse. Lines:   Peripheral IV 11/17/21 Right Wrist (Active)   Site Assessment Clean, dry, & intact 11/17/21 1442   Phlebitis Assessment 0 11/17/21 1442   Infiltration Assessment 0 11/17/21 1442   Dressing Status Clean, dry, & intact 11/17/21 1442   Dressing Type Transparent; Tape 11/17/21 1442   Hub Color/Line Status Patent 11/17/21 1442   Alcohol Cap Used No 11/17/21 1442        Opportunity for questions and clarification was provided. Patient transported with:   O2 @ 3 liters  Tech    VTE prophylaxis orders have been written for [employer]. Patient and family given floor number and nurses name. Family updated re: pt status after security code verified.

## 2021-11-17 NOTE — H&P
Hospitalist History and Physical   Admit Date:  2021  8:03 AM   Name:  Chidi Perales   Age:  80 y.o. Sex:  female  :  1935   MRN:  [id number]   Room:  /    Presenting Complaint: Hip Pain    Reason(s) for Admission: Intertrochanteric fracture of left femur ([facility] [address] [S72.142A]     History of Present Illness:   Chidi Perales is a 80 y.o. female with medical history of dementia, hypertension, anxiety and hard hearing. Presently at assisted living facility/memory care. Patient says she remembers going to restroom, had a fall, does not remember how that happened. Called daughter on phone, daughter says she was told by the facility that patient went to restroom sometime last night, around of 5:50 AM she received a call saying that patient was found on the floor and hollering with pain, felt patient might have had a fracture and they are sending the patient to emergency room for further evaluation. Patient otherwise does not complain of any headache or dizziness or any chest pain or any nausea vomiting or abdominal pain. Does complain of pain over the left hip region on any small movement. Initial potassium 5.4, hemolyzed sample repeat potassium of 2.7. EKG reporting changes in lateral leads. Covid test negative. Left hip-Intertrochanteric fracture. Mid and distal femoral shaft  intact. Osteopenia.     Patient would be admitted for further management of left hip intertrochanteric fracture. Review of Systems:  10 systems reviewed and negative except as noted in HPI. Assessment & Plan: Active Problems:    Intertrochanteric fracture of left femur ([facility] [address] (2021)  N.p.o. As needed pain medication  Patient cleared for surgery-read stratification mild. Hypokalemia  Replace with 40 mEq of potassium chloride IV    Hypertension  Mild elevated, probably pain related.   As needed labetalol    Dementia  Bilateral hearing problems      Discussed patient CODE STATUS with the daughter, patient as per daughter is DNR. Diet: DIET NPO  VTE ppx: scd  Code status: DNR    Hospital Problems as of 11/17/2021 Date Reviewed: 11/4/2021          Codes Class Noted - Resolved POA    Intertrochanteric fracture of left femur ([facility] [address] ICD-10-CM: G34.228C  ICD-9-CM: 820.21  11/17/2021 - Present Unknown        Hypokalemia ICD-10-CM: E87.6  ICD-9-CM: 276.8  11/17/2021 - Present Unknown        Anxiety ICD-10-CM: F41.9  ICD-9-CM: 300.00  12/7/2020 - Present Yes        Memory difficulties ICD-10-CM: R41.3  ICD-9-CM: 780.93  11/2/2020 - Present Yes        Essential hypertension ICD-10-CM: I10  ICD-9-CM: 401.9  3/5/2018 - Present Yes        Bilateral hearing loss ICD-10-CM: H91.93  ICD-9-CM: 389.9  3/5/2018 - Present Yes    Overview Signed 3/5/2018 10:20 AM by Glenroy Hendrickson MD     S/p cochlear implant 2017                   Past History:  Past Medical History:   Diagnosis Date    Broken bones     Diabetes ([facility] [address]     Ear problems     Hearing problem     History of mammogram 2015    History of Papanicolaou smear of cervix >5 years ago    Hypertension      Past Surgical History:   Procedure Laterality Date    [facility] - [facility]ATH COCHLEAR IMPLANT  02/21/2017    [place], [place] HX COLONOSCOPY  2008    HX ORTHOPAEDIC  2008    lumbar laminectomy; [place], [place]    HX TUBAL LIGATION        Allergies   Allergen Reactions    Aricept [Donepezil] Itching      Social History     Tobacco Use    Smoking status: Never Smoker    Smokeless tobacco: Never Used   Substance Use Topics    Alcohol use: No      Family History   Problem Relation Age of Onset    Stroke Mother     Colon Cancer Sister     Breast Cancer Sister     Heart Disease Maternal Grandmother       Family history reviewed and negative except as otherwise noted.     Immunization History   Administered Date(s) Administered    Influenza High Dose Vaccine PF 11/14/2017, 09/25/2018    Influenza, Quadrivalent, Adjuvanted (>65 Yrs FLUAD QUAD [id number]) 09/21/2020    Pneumococcal Conjugate (PCV-13) 03/05/2018    Pneumococcal Polysaccharide (PPSV-23) 09/15/2016    Tdap 11/02/2020    Zoster Recombinant 11/02/2020, 01/13/2021     Prior to Admit Medications:  Current Outpatient Medications   Medication Instructions    amLODIPine (NORVASC) 10 mg tablet 1 Tablet, Oral, DAILY    cetirizine (ZYRTEC) 10 mg, Oral, DAILY    D-MANNOSE PO 6,060 mg, Oral, DAILY    Flaxseed Oil oil 1,400 mg, Does Not Apply, DAILY    hydrOXYzine HCL (ATARAX) 25 mg, Oral, DAILY AS NEEDED    lancets (One Touch Delica) 33 gauge misc One Touch Ultra 2 lancets, check fs every day, DM2, E11.9    memantine (NAMENDA) 5 mg, Oral, DAILY    triamcinolone acetonide (KENALOG) 0.1 % topical cream Topical, 2 TIMES DAILY, use thin layer       Objective:     Patient Vitals for the past 24 hrs:   Temp Pulse Resp BP SpO2   11/17/21 1243 98 °F (36.7 °C) 91 20 -- 94 %   11/17/21 1032 -- 96 23 (!) 196/84 98 %   11/17/21 0808 98 °F (36.7 °C) 98 20 (!) 175/83 94 %     Oxygen Therapy  O2 Sat (%): 94 % (11/17/21 1243)  Pulse via Oximetry: 97 beats per minute (11/17/21 1032)  O2 Device: None (Room air) (11/17/21 1243)    Estimated body mass index is 23.3 kg/m² as calculated from the following:    Height as of this encounter: 5' 5\" (1.651 m). Weight as of this encounter: 63.5 kg (140 lb). No intake or output data in the 24 hours ending 11/17/21 1257      Physical Exam:    Blood pressure (!) 196/84, pulse 91, temperature 98 °F (36.7 °C), resp. rate 20, height 5' 5\" (1.651 m), weight 63.5 kg (140 lb), SpO2 94 %. General:    Well nourished. No overt distress. Mild hard at hearing, can understand on talking louder. Head:  Normocephalic, atraumatic  Eyes:  Sclerae appear normal.  Pupils equally round. ENT:  Nares appear normal, no drainage. Moist oral mucosa  Neck:  No restricted ROM. Trachea midline   CV:   RRR. No m/r/g. No jugular venous distension. Lungs:   CTAB. No wheezing, rhonchi, or rales.   Respirations even, unlabored  Abdomen: Bowel sounds present. Soft, nontender, nondistended. Extremities: Left lower extremity abduction at hip, flexion at knee, eversion at ankle. Mild tenderness on palpation over the left hip region. Skin:     No rashes and normal coloration. Warm and dry. Neuro:  CN II-XII grossly intact. Sensation intact. A&Ox3  Psych:  Normal mood and affect. I have reviewed ordered lab tests and independently visualized imaging below:    Last 24hr Labs:  Recent Results (from the past 24 hour(s))   EKG, 12 LEAD, INITIAL    Collection Time: 11/17/21  8:10 AM   Result Value Ref Range    Ventricular Rate 84 BPM    Atrial Rate 84 BPM    P-R Interval 152 ms    QRS Duration 90 ms    Q-T Interval 424 ms    QTC Calculation (Bezet) 501 ms    Calculated P Axis 83 degrees    Calculated R Axis 51 degrees    Calculated T Axis 114 degrees    Diagnosis       !! AGE AND GENDER SPECIFIC ECG ANALYSIS !! Normal sinus rhythm  ST & T wave abnormality, consider lateral ischemia  Prolonged QT  Abnormal ECG  When compared with ECG of 01-OCT-2021 10:35,  T wave inversion now evident in Lateral leads  QT has lengthened     CBC WITH AUTOMATED DIFF    Collection Time: 11/17/21  8:14 AM   Result Value Ref Range    WBC 11.4 (H) 4.3 - 11.1 K/uL    RBC 3.97 (L) 4.05 - 5.2 M/uL    HGB 12.1 11.7 - 15.4 g/dL    HCT 37.8 35.8 - 46.3 %    MCV 95.2 79.6 - 97.8 FL    MCH 30.5 26.1 - 32.9 PG    MCHC 32.0 31.4 - 35.0 g/dL    RDW 13.7 11.9 - 14.6 %    PLATELET 412 514 - 781 K/uL    MPV 10.1 9.4 - 12.3 FL    ABSOLUTE NRBC 0.00 0.0 - 0.2 K/uL    DF AUTOMATED      NEUTROPHILS 87 (H) 43 - 78 %    LYMPHOCYTES 7 (L) 13 - 44 %    MONOCYTES 5 4.0 - 12.0 %    EOSINOPHILS 1 0.5 - 7.8 %    BASOPHILS 0 0.0 - 2.0 %    IMMATURE GRANULOCYTES 1 0.0 - 5.0 %    ABS. NEUTROPHILS 9.9 (H) 1.7 - 8.2 K/UL    ABS. LYMPHOCYTES 0.8 0.5 - 4.6 K/UL    ABS. MONOCYTES 0.6 0.1 - 1.3 K/UL    ABS. EOSINOPHILS 0.1 0.0 - 0.8 K/UL    ABS.  BASOPHILS 0.0 0.0 - 0.2 K/UL ABS. IMM. GRANS. 0.1 0.0 - 0.5 K/UL   METABOLIC PANEL, COMPREHENSIVE    Collection Time: 11/17/21  8:14 AM   Result Value Ref Range    Sodium 140 136 - 145 mmol/L    Potassium 5.4 (H) 3.5 - 5.1 mmol/L    Chloride 109 (H) 98 - 107 mmol/L    CO2 28 21 - 32 mmol/L    Anion gap 3 (L) 7 - 16 mmol/L    Glucose 166 (H) 65 - 100 mg/dL    BUN 18 8 - 23 MG/DL    Creatinine 0.86 0.6 - 1.0 MG/DL    GFR est AA >60 >60 ml/min/1.73m2    GFR est non-AA >60 >60 ml/min/1.73m2    Calcium 9.0 8.3 - 10.4 MG/DL    Bilirubin, total 0.4 0.2 - 1.1 MG/DL    ALT (SGPT) 36 12 - 65 U/L    AST (SGOT) 91 (H) 15 - 37 U/L    Alk. phosphatase 86 50 - 136 U/L    Protein, total 7.8 6.3 - 8.2 g/dL    Albumin 3.4 3.2 - 4.6 g/dL    Globulin 4.4 (H) 2.3 - 3.5 g/dL    A-G Ratio 0.8 (L) 1.2 - 3.5     PROTHROMBIN TIME + INR    Collection Time: 11/17/21  8:14 AM   Result Value Ref Range    Prothrombin time 13.0 12.6 - 14.5 sec    INR 1.0     TROPONIN-HIGH SENSITIVITY    Collection Time: 11/17/21  8:14 AM   Result Value Ref Range    Troponin-High Sensitivity 60.4 (H) 0 - 14 pg/mL   TYPE & SCREEN    Collection Time: 11/17/21  8:15 AM   Result Value Ref Range    Crossmatch Expiration 11/20/2021,2359     ABO/Rh(D) AB POSITIVE     Antibody screen NEG    POTASSIUM    Collection Time: 11/17/21  9:54 AM   Result Value Ref Range    Potassium 2.7 (L) 3.5 - 5.1 mmol/L   COVID-19 RAPID TEST    Collection Time: 11/17/21 10:12 AM   Result Value Ref Range    Specimen source NASAL      COVID-19 rapid test Not detected NOTD         All Micro Results     Procedure Component Value Units Date/Time    COVID-19 RAPID TEST [[id number]] Collected: 11/17/21 1012    Order Status: Completed Specimen: Nasopharyngeal Updated: 11/17/21 1042     Specimen source NASAL        Comment: RAPID ONLY        COVID-19 rapid test Not detected        Comment:      The specimen is NEGATIVE for SARS-CoV-2, the novel coronavirus associated with COVID-19. A negative result does not rule out COVID-19. This test has been authorized by the FDA under an Emergency Use Authorization (EUA) for use by authorized laboratories. Fact sheet for Healthcare Providers: ConventionUpdate.co.nz  Fact sheet for Patients: ConventionUpdate.co.nz       Methodology: Isothermal Nucleic Acid Amplification               Other Studies:  XR CHEST SNGL V    Result Date: 11/17/2021  CHEST X-RAY, one view. INDICATION:  Dizziness and fall. Preoperative evaluation for hip fracture surgical repair. TECHNIQUE:  AP supine view. COMPARISON: One October 2021 FINDINGS: Lungs: are clear. Costophrenic angles: are sharp. Heart size: is normal. Pulmonary vasculature: is unremarkable. Aorta: Mildly tortuous. Included portion of the upper abdomen: is unremarkable. Bones: Mild angulation of the proximal left humerus fracture Other: None. Negative for acute change. Mild angulation proximal left humerus fracture. PELVIS AND LEFT HIP, 3 views. INDICATION: Hip pain following fall. TECHNIQUE: AP view of the pelvis and coned down AP and frogleg lateral of the hip. FINDINGS: Pelvic bony ring: Appears intact. SI joints: Appear symmetric. Pubic rami: Appear intact. Lower lumbar spine: Mild DJD Femoral head:  Has a round smooth contour. Joint space: Mildly narrowed. Femoral neck and proximal femoral shaft:  Intertrochanteric fracture with mild varus deformity IMPRESSION:  Intertrochanteric fracture with mild varus deformity. LEFT FEMUR 3 view(s). INDICATION: Hip pain following fall today. TECHNIQUE: AP and lateral views. COMPARISON: None. FINDINGS / IMPRESSION: Intertrochanteric fracture. Mid and distal femoral shaft intact. Osteopenia. XR HIP LT W OR WO PELV 2-3 VWS    Result Date: 11/17/2021  CHEST X-RAY, one view. INDICATION:  Dizziness and fall. Preoperative evaluation for hip fracture surgical repair. TECHNIQUE:  AP supine view. COMPARISON: One October 2021 FINDINGS: Lungs: are clear.  Costophrenic angles: are sharp. Heart size: is normal. Pulmonary vasculature: is unremarkable. Aorta: Mildly tortuous. Included portion of the upper abdomen: is unremarkable. Bones: Mild angulation of the proximal left humerus fracture Other: None. Negative for acute change. Mild angulation proximal left humerus fracture. PELVIS AND LEFT HIP, 3 views. INDICATION: Hip pain following fall. TECHNIQUE: AP view of the pelvis and coned down AP and frogleg lateral of the hip. FINDINGS: Pelvic bony ring: Appears intact. SI joints: Appear symmetric. Pubic rami: Appear intact. Lower lumbar spine: Mild DJD Femoral head:  Has a round smooth contour. Joint space: Mildly narrowed. Femoral neck and proximal femoral shaft:  Intertrochanteric fracture with mild varus deformity IMPRESSION:  Intertrochanteric fracture with mild varus deformity. LEFT FEMUR 3 view(s). INDICATION: Hip pain following fall today. TECHNIQUE: AP and lateral views. COMPARISON: None. FINDINGS / IMPRESSION: Intertrochanteric fracture. Mid and distal femoral shaft intact. Osteopenia. XR FEMUR LT 2 V    Result Date: 11/17/2021  CHEST X-RAY, one view. INDICATION:  Dizziness and fall. Preoperative evaluation for hip fracture surgical repair. TECHNIQUE:  AP supine view. COMPARISON: One October 2021 FINDINGS: Lungs: are clear. Costophrenic angles: are sharp. Heart size: is normal. Pulmonary vasculature: is unremarkable. Aorta: Mildly tortuous. Included portion of the upper abdomen: is unremarkable. Bones: Mild angulation of the proximal left humerus fracture Other: None. Negative for acute change. Mild angulation proximal left humerus fracture. PELVIS AND LEFT HIP, 3 views. INDICATION: Hip pain following fall. TECHNIQUE: AP view of the pelvis and coned down AP and frogleg lateral of the hip. FINDINGS: Pelvic bony ring: Appears intact. SI joints: Appear symmetric. Pubic rami: Appear intact. Lower lumbar spine: Mild DJD Femoral head:  Has a round smooth contour.  Joint space: Mildly narrowed. Femoral neck and proximal femoral shaft:  Intertrochanteric fracture with mild varus deformity IMPRESSION:  Intertrochanteric fracture with mild varus deformity. LEFT FEMUR 3 view(s). INDICATION: Hip pain following fall today. TECHNIQUE: AP and lateral views. COMPARISON: None. FINDINGS / IMPRESSION: Intertrochanteric fracture. Mid and distal femoral shaft intact. Osteopenia.        Medications Administered     fentaNYL citrate (PF) injection 50 mcg     Admin Date  11/17/2021 Action  Given Dose  50 mcg Route  IntraVENous Administered By  Jerry Horner RN                Signed:  Jayshree Melo MD

## 2021-11-18 LAB
ANION GAP SERPL CALC-SCNC: 6 MMOL/L (ref 7–16)
BASOPHILS # BLD: 0 K/UL (ref 0–0.2)
BASOPHILS NFR BLD: 0 % (ref 0–2)
BUN SERPL-MCNC: 13 MG/DL (ref 8–23)
CALCIUM SERPL-MCNC: 8.3 MG/DL (ref 8.3–10.4)
CHLORIDE SERPL-SCNC: 108 MMOL/L (ref 98–107)
CO2 SERPL-SCNC: 29 MMOL/L (ref 21–32)
CREAT SERPL-MCNC: 0.8 MG/DL (ref 0.6–1)
DIFFERENTIAL METHOD BLD: ABNORMAL
EOSINOPHIL # BLD: 0 K/UL (ref 0–0.8)
EOSINOPHIL NFR BLD: 0 % (ref 0.5–7.8)
ERYTHROCYTE [DISTWIDTH] IN BLOOD BY AUTOMATED COUNT: 13.8 % (ref 11.9–14.6)
GLUCOSE SERPL-MCNC: 172 MG/DL (ref 65–100)
HCT VFR BLD AUTO: 27.2 % (ref 35.8–46.3)
HGB BLD-MCNC: 8.8 G/DL (ref 11.7–15.4)
IMM GRANULOCYTES # BLD AUTO: 0 K/UL (ref 0–0.5)
IMM GRANULOCYTES NFR BLD AUTO: 0 % (ref 0–5)
LYMPHOCYTES # BLD: 0.7 K/UL (ref 0.5–4.6)
LYMPHOCYTES NFR BLD: 11 % (ref 13–44)
MCH RBC QN AUTO: 30.8 PG (ref 26.1–32.9)
MCHC RBC AUTO-ENTMCNC: 32.4 G/DL (ref 31.4–35)
MCV RBC AUTO: 95.1 FL (ref 79.6–97.8)
MM INDURATION POC: 0 MM (ref 0–5)
MONOCYTES # BLD: 0.7 K/UL (ref 0.1–1.3)
MONOCYTES NFR BLD: 10 % (ref 4–12)
NEUTS SEG # BLD: 5.4 K/UL (ref 1.7–8.2)
NEUTS SEG NFR BLD: 79 % (ref 43–78)
NRBC # BLD: 0 K/UL (ref 0–0.2)
PLATELET # BLD AUTO: 176 K/UL (ref 150–450)
PMV BLD AUTO: 10.1 FL (ref 9.4–12.3)
POTASSIUM SERPL-SCNC: 3.4 MMOL/L (ref 3.5–5.1)
PPD POC: NEGATIVE NEGATIVE
RBC # BLD AUTO: 2.86 M/UL (ref 4.05–5.2)
SODIUM SERPL-SCNC: 143 MMOL/L (ref 136–145)
WBC # BLD AUTO: 6.8 K/UL (ref 4.3–11.1)

## 2021-11-18 PROCEDURE — 74011250637 HC RX REV CODE- 250/637: Performed by: FAMILY MEDICINE

## 2021-11-18 PROCEDURE — 74011250636 HC RX REV CODE- 250/636: Performed by: ORTHOPAEDIC SURGERY

## 2021-11-18 PROCEDURE — 80048 BASIC METABOLIC PNL TOTAL CA: CPT

## 2021-11-18 PROCEDURE — 65270000029 HC RM PRIVATE

## 2021-11-18 PROCEDURE — 74011250637 HC RX REV CODE- 250/637: Performed by: ORTHOPAEDIC SURGERY

## 2021-11-18 PROCEDURE — 97162 PT EVAL MOD COMPLEX 30 MIN: CPT

## 2021-11-18 PROCEDURE — 74011250636 HC RX REV CODE- 250/636: Performed by: INTERNAL MEDICINE

## 2021-11-18 PROCEDURE — 74011250636 HC RX REV CODE- 250/636: Performed by: FAMILY MEDICINE

## 2021-11-18 PROCEDURE — 97530 THERAPEUTIC ACTIVITIES: CPT

## 2021-11-18 PROCEDURE — 36415 COLL VENOUS BLD VENIPUNCTURE: CPT

## 2021-11-18 PROCEDURE — 85025 COMPLETE CBC W/AUTO DIFF WBC: CPT

## 2021-11-18 PROCEDURE — 74011000258 HC RX REV CODE- 258: Performed by: ORTHOPAEDIC SURGERY

## 2021-11-18 PROCEDURE — 97166 OT EVAL MOD COMPLEX 45 MIN: CPT

## 2021-11-18 PROCEDURE — 97535 SELF CARE MNGMENT TRAINING: CPT

## 2021-11-18 RX ORDER — POTASSIUM CHLORIDE 14.9 MG/ML
20 INJECTION INTRAVENOUS ONCE
Status: COMPLETED | OUTPATIENT
Start: 2021-11-18 | End: 2021-11-18

## 2021-11-18 RX ADMIN — SODIUM CHLORIDE, SODIUM LACTATE, POTASSIUM CHLORIDE, AND CALCIUM CHLORIDE 75 ML/HR: 600; 310; 30; 20 INJECTION, SOLUTION INTRAVENOUS at 08:31

## 2021-11-18 RX ADMIN — AMLODIPINE BESYLATE 10 MG: 10 TABLET ORAL at 08:36

## 2021-11-18 RX ADMIN — POTASSIUM CHLORIDE 20 MEQ: 14.9 INJECTION, SOLUTION INTRAVENOUS at 13:33

## 2021-11-18 RX ADMIN — Medication 1 TABLET: at 08:36

## 2021-11-18 RX ADMIN — ONDANSETRON 4 MG: 2 INJECTION INTRAMUSCULAR; INTRAVENOUS at 10:54

## 2021-11-18 RX ADMIN — Medication 10 ML: at 06:05

## 2021-11-18 RX ADMIN — Medication 5 ML: at 14:36

## 2021-11-18 RX ADMIN — Medication 10 ML: at 22:00

## 2021-11-18 RX ADMIN — ONDANSETRON 4 MG: 2 INJECTION INTRAMUSCULAR; INTRAVENOUS at 16:18

## 2021-11-18 RX ADMIN — HYDROCODONE BITARTRATE AND ACETAMINOPHEN 1 TABLET: 7.5; 325 TABLET ORAL at 08:42

## 2021-11-18 RX ADMIN — CEFAZOLIN SODIUM 1 G: 1 INJECTION, POWDER, FOR SOLUTION INTRAMUSCULAR; INTRAVENOUS at 06:04

## 2021-11-18 RX ADMIN — MEMANTINE 5 MG: 5 TABLET ORAL at 08:42

## 2021-11-18 RX ADMIN — ASPIRIN 325 MG: 325 TABLET, COATED ORAL at 08:36

## 2021-11-18 RX ADMIN — HYDROCODONE BITARTRATE AND ACETAMINOPHEN 1 TABLET: 7.5; 325 TABLET ORAL at 14:45

## 2021-11-18 NOTE — ANESTHESIA POSTPROCEDURE EVALUATION
Procedure(s):  OPEN TREATMENT OF LEFT PROXIMAL FEMUR FRACTURE WITH GAMMA INTRAMEDULLARY NAIL. general    Anesthesia Post Evaluation      Multimodal analgesia: multimodal analgesia used between 6 hours prior to anesthesia start to PACU discharge  Patient location during evaluation: bedside  Patient participation: complete - patient participated  Level of consciousness: awake and alert  Pain management: adequate  Airway patency: patent  Anesthetic complications: no  Cardiovascular status: acceptable  Respiratory status: acceptable  Hydration status: acceptable  Post anesthesia nausea and vomiting:  controlled  Final Post Anesthesia Temperature Assessment:  Normothermia (36.0-37.5 degrees C)      INITIAL Post-op Vital signs:   Vitals Value Taken Time   /62 11/17/21 1622   Temp 37.2 °C (99 °F) 11/17/21 1542   Pulse 90 11/17/21 1627   Resp 16 11/17/21 1601   SpO2 98 % 11/17/21 1627   Vitals shown include unvalidated device data.

## 2021-11-18 NOTE — PROGRESS NOTES
Progress Note    Patient: Lyla Manuel MRN: [id number]  SSN: [id number]    YOB: 1935  Age: 80 y.o. Sex: female      Admit Date: 11/17/2021    LOS: 1 day     Assessment and Plan:   80 y.o. female with medical history of dementia, hypertension, anxiety that presented after a fall    1. Intertrochanteric left femoral fracture  -S/p ORIF  -Pain management   -Ortho recs  -PT eval    2. Hypokalemia   -Replete  -Monitor K    3. HTN  -Continue amlodipine    DVT ppx      Subjective:   80 y.o. female with medical history of dementia, hypertension, anxiety that presented after a fall. Patient seen and examined at bedside. This morning having some hip pain. No chest pain, no SOB, no nausea or vomiting. Objective:     Vitals:    11/18/21 0051 11/18/21 0422 11/18/21 0728 11/18/21 1114   BP: (!) 110/59 110/70 136/69 (!) 137/56   Pulse: 93 91 85 78   Resp: 18 18 18 18   Temp: 98.6 °F (37 °C) 98 °F (36.7 °C) 97.5 °F (36.4 °C) 97.3 °F (36.3 °C)   SpO2: 96% 97% 96% 96%   Weight:       Height:            Intake and Output:  Current Shift: No intake/output data recorded.   Last three shifts: 11/16 1901 - 11/18 0700  In: 350 [I.V.:350]  Out: 1400 [Urine:1300]    ROS  10 ROS negative except from stated on subjective    Physical Exam:   General: Alert, NAD  HEENT: NC/AT, EOM are intact  Neck: supple, no JVD  Cardiovascular: RRR, S1, S2, no murmurs  Respiratory: Lungs are clear, no wheezes or rales  Abdomen: Soft, NT, ND  Back: No CVA tenderness, no paraspinal tenderness  Extremities: LE without pedal edema, no erythema  Neuro: CN are intact, no focal deficits  Skin: no rash or ulcers  Psych: good mood and affect    Lab/Data Review:  I have personally reviewed patients laboratory data showing  Recent Results (from the past 24 hour(s))   MAGNESIUM    Collection Time: 11/17/21  6:24 PM   Result Value Ref Range    Magnesium 1.9 1.8 - 2.4 mg/dL   TROPONIN-HIGH SENSITIVITY    Collection Time: 11/17/21  6:24 PM   Result Value Ref Range    Troponin-High Sensitivity 53.7 (H) 0 - 14 pg/mL   METABOLIC PANEL, BASIC    Collection Time: 11/18/21  5:20 AM   Result Value Ref Range    Sodium 143 136 - 145 mmol/L    Potassium 3.4 (L) 3.5 - 5.1 mmol/L    Chloride 108 (H) 98 - 107 mmol/L    CO2 29 21 - 32 mmol/L    Anion gap 6 (L) 7 - 16 mmol/L    Glucose 172 (H) 65 - 100 mg/dL    BUN 13 8 - 23 MG/DL    Creatinine 0.80 0.6 - 1.0 MG/DL    GFR est AA >60 >60 ml/min/1.73m2    GFR est non-AA >60 >60 ml/min/1.73m2    Calcium 8.3 8.3 - 10.4 MG/DL   CBC WITH AUTOMATED DIFF    Collection Time: 11/18/21  5:20 AM   Result Value Ref Range    WBC 6.8 4.3 - 11.1 K/uL    RBC 2.86 (L) 4.05 - 5.2 M/uL    HGB 8.8 (L) 11.7 - 15.4 g/dL    HCT 27.2 (L) 35.8 - 46.3 %    MCV 95.1 79.6 - 97.8 FL    MCH 30.8 26.1 - 32.9 PG    MCHC 32.4 31.4 - 35.0 g/dL    RDW 13.8 11.9 - 14.6 %    PLATELET 859 686 - 545 K/uL    MPV 10.1 9.4 - 12.3 FL    ABSOLUTE NRBC 0.00 0.0 - 0.2 K/uL    DF AUTOMATED      NEUTROPHILS 79 (H) 43 - 78 %    LYMPHOCYTES 11 (L) 13 - 44 %    MONOCYTES 10 4.0 - 12.0 %    EOSINOPHILS 0 (L) 0.5 - 7.8 %    BASOPHILS 0 0.0 - 2.0 %    IMMATURE GRANULOCYTES 0 0.0 - 5.0 %    ABS. NEUTROPHILS 5.4 1.7 - 8.2 K/UL    ABS. LYMPHOCYTES 0.7 0.5 - 4.6 K/UL    ABS. MONOCYTES 0.7 0.1 - 1.3 K/UL    ABS. EOSINOPHILS 0.0 0.0 - 0.8 K/UL    ABS. BASOPHILS 0.0 0.0 - 0.2 K/UL    ABS. IMM. GRANS. 0.0 0.0 - 0.5 K/UL      All Micro Results     Procedure Component Value Units Date/Time    COVID-19 RAPID TEST [[id number]] Collected: 11/17/21 1012    Order Status: Completed Specimen: Nasopharyngeal Updated: 11/17/21 1042     Specimen source NASAL        Comment: RAPID ONLY        COVID-19 rapid test Not detected        Comment:      The specimen is NEGATIVE for SARS-CoV-2, the novel coronavirus associated with COVID-19. A negative result does not rule out COVID-19.        This test has been authorized by the FDA under an Emergency Use Authorization (EUA) for use by authorized laboratories. Fact sheet for Healthcare Providers: ConventionUpdate.co.nz  Fact sheet for Patients: ConventionUpdate.co.nz       Methodology: Isothermal Nucleic Acid Amplification                Image:  I have personally reviewed patients imaging showing  XR HIP LT W OR WO PELV 2-3 VWS   Final Result   Post internal fixation of a left hip fracture         NC XR TECHNOLOGIST SERVICE   Final Result      A Radiologist has not reviewed images associated with this exam.      XR FEMUR LT 2 V   Final Result   4 intraoperative C-arm views during placement of dynamic left hip   screw and intramedullary adrianne are noted reducing a proximal femoral fracture. XR HIP LT W OR WO PELV 2-3 VWS   Final Result   Negative for acute change. Mild angulation proximal left humerus   fracture. PELVIS AND LEFT HIP, 3 views. INDICATION: Hip pain following fall. TECHNIQUE: AP view of the pelvis and coned down AP and frogleg lateral of the   hip. FINDINGS:    Pelvic bony ring: Appears intact. SI joints: Appear symmetric. Pubic rami: Appear intact. Lower lumbar spine: Mild DJD      Femoral head:  Has a round smooth contour. Joint space: Mildly narrowed. Femoral neck and proximal femoral shaft:  Intertrochanteric fracture with mild   varus deformity      IMPRESSION:  Intertrochanteric fracture with mild varus deformity. LEFT FEMUR 3 view(s). INDICATION: Hip pain following fall today. TECHNIQUE: AP and lateral views. COMPARISON: None. FINDINGS / IMPRESSION: Intertrochanteric fracture. Mid and distal femoral shaft   intact. Osteopenia. XR FEMUR LT 2 V   Final Result   Negative for acute change. Mild angulation proximal left humerus   fracture. PELVIS AND LEFT HIP, 3 views. INDICATION: Hip pain following fall.       TECHNIQUE: AP view of the pelvis and coned down AP and frogleg lateral of the   hip. FINDINGS:    Pelvic bony ring: Appears intact. SI joints: Appear symmetric. Pubic rami: Appear intact. Lower lumbar spine: Mild DJD      Femoral head:  Has a round smooth contour. Joint space: Mildly narrowed. Femoral neck and proximal femoral shaft:  Intertrochanteric fracture with mild   varus deformity      IMPRESSION:  Intertrochanteric fracture with mild varus deformity. LEFT FEMUR 3 view(s). INDICATION: Hip pain following fall today. TECHNIQUE: AP and lateral views. COMPARISON: None. FINDINGS / IMPRESSION: Intertrochanteric fracture. Mid and distal femoral shaft   intact. Osteopenia. XR CHEST SNGL V   Final Result   Negative for acute change. Mild angulation proximal left humerus   fracture. PELVIS AND LEFT HIP, 3 views. INDICATION: Hip pain following fall. TECHNIQUE: AP view of the pelvis and coned down AP and frogleg lateral of the   hip. FINDINGS:    Pelvic bony ring: Appears intact. SI joints: Appear symmetric. Pubic rami: Appear intact. Lower lumbar spine: Mild DJD      Femoral head:  Has a round smooth contour. Joint space: Mildly narrowed. Femoral neck and proximal femoral shaft:  Intertrochanteric fracture with mild   varus deformity      IMPRESSION:  Intertrochanteric fracture with mild varus deformity. LEFT FEMUR 3 view(s). INDICATION: Hip pain following fall today. TECHNIQUE: AP and lateral views. COMPARISON: None. FINDINGS / IMPRESSION: Intertrochanteric fracture. Mid and distal femoral shaft   intact. Osteopenia.                                       Hospital problems     Patient Active Problem List   Diagnosis Code    Essential hypertension I10    Vertigo R42    Type 2 diabetes mellitus without complication, without long-term current use of insulin ([facility]) E11.9    Bilateral hearing loss H91.93    Seasonal allergic rhinitis due to pollen J30.1    Memory difficulties R41.3    Anxiety F41.9    Intertrochanteric fracture of left femur (H[facility]) S72.142A    Hypokalemia E87.6        I have reviewed, updated, and verified this note's content and spent 36 minutes of my 40 minutes visit performing counseling and coordination of care regarding medical management.        Signed By: Cesar Mckenzie MD     November 18, 2021

## 2021-11-18 NOTE — PROGRESS NOTES
ACUTE PHYSICAL THERAPY GOALS:  (Developed with and agreed upon by patient and/or caregiver.)    (1.) Laya Min Brace  will move from supine to sit and sit to supine  with SUPERVISION within 7 treatment day(s). (2.) Sara Wilkes will transfer from bed to chair and chair to bed with SUPERVISION using the least restrictive device within 7 treatment day(s). (3.) Sara Wilkes will ambulate with SUPERVISION for 50 feet with the least restrictive device within 7 treatment day(s). (4.) Sara Wilkes will perform standing static and dynamic balance activities x 20 minutes with SUPERVISION to improve safety within 7 treatment day(s). (5.) Sara Wilkes will perform bilateral lower extremity exercises x 20 min for HEP with SUPERVISION to improve strength, endurance, and functional mobility within 7 treatment day(s).      PHYSICAL THERAPY ASSESSMENT: Initial Assessment, Daily Note and AM PT Treatment Day # 1      Sara Wilkes is a 80 y.o. female   PRIMARY DIAGNOSIS: Intertrochanteric fracture of left femur ([facility]  Intertrochanteric fracture of left femur ([facility] [S72.142A]  Procedure(s) (LRB):  OPEN TREATMENT OF LEFT PROXIMAL FEMUR FRACTURE WITH GAMMA INTRAMEDULLARY NAIL (Left)  1 Day Post-Op  Reason for Referral:    ICD-10: Treatment Diagnosis: Pain in left hip (M25.552)  Difficulty in walking, Not elsewhere classified (R26.2)  INPATIENT: Payor: [facility] MEDICARE / Plan: [address] [place] [address] HMO / Product Type: Managed Care Medicare /     ASSESSMENT:     REHAB RECOMMENDATIONS:   Recommendation to date pending progress:  Setting:   Short-term Rehab  Equipment:    To Be Determined     PRIOR LEVEL OF FUNCTION:  (Prior to Hospitalization) INITIAL/CURRENT LEVEL OF FUNCTION:  (Most Recently Demonstrated)   Bed Mobility:   Unknown  Sit to Stand:   Unknown  Transfers:   Unknown  Gait/Mobility:   Unknown Bed Mobility:   Minimal Assistance x 2  Sit to Stand:   Minimal Assistance x 2  Transfers:   Moderate Assistance x 2  Gait/Mobility:   Moderate Assistance x 2     ASSESSMENT:  Ms. South Lim is a 80year old F who presents s/p above procedures, POD 1 after fall at [facility]. Per PT orders she is WBAT. Pt is very [place] and does not have her cochlear implants with her currently. She does not use sign language, used writing to communicate with pt. Unable to collect history due to confusion and hearing loss. Per chart review, pt lives at an [facility] and has dementia. She is typically independent with ADL's, uses RW for ambulation. Today pt presents oriented to self only. She knows she is in the hospital but is unsure why and does not know the current year. This date pt performs mobility including bed mobility with Valeria of 2. She was able to transfer to [facility] and back to bed with RW and modA of 2. Valeria of 2 to return to supine, maxA of 2 for scooting. Of note, pt complained of rib pain as well as hip pain- RN informed. Pt with increased anxiety and confusion given situation and inability to hear healthcare providers. Provided pt with reassurance through writing, she was left in bed with alarm on and all needs met. Pt presents as functioning below her baseline, with deficits in mobility including transfers, gait, balance, and activity tolerance. Pt will benefit from skilled therapy services to address stated deficits to promote return to highest level of function, independence, and safety. Will continue to follow. SUBJECTIVE:   Ms. South Lim states, \"[place] \"    SOCIAL HISTORY/LIVING ENVIRONMENT: PLOF: per chat review came from [facility], was ind with ADL's, using RW for ambulation  Support Systems: Child(justin) (daughter Galina Marks [phone])  OBJECTIVE:     PAIN: VITAL SIGNS: LINES/DRAINS:   Pre Treatment: Pain Screen  Pain Scale 1: Visual  Pain Intensity 1: 4  Pain Onset 1: post op  Pain Location 1: Hip  Pain Orientation 1: Left  Pain Intervention(s) 1: Ambulation/Increased Activity  Post Treatment: 0   none  O2 Device: Nasal cannula GROSS EVALUATION:  B LE Within Functional Limits Abnormal/ Functional Abnormal/ Non-Functional (see comments) Not Tested Comments:   AROM [] [x] [] [] L LE limited post op   PROM [] [] [] [x]    Strength [] [x] [] [] L LE limited post op   Balance [] [x] [] [] Fair+ sitting, fair- standing    Posture [] [x] [] [] Forward flexed   Sensation [] [] [] [x]    Coordination [] [] [] [x]    Tone [] [] [] [x]    Edema [] [] [] [x]    Activity Tolerance [] [x] [] [] Fatigues easily    [] [] [] []      COGNITION/  PERCEPTION: Intact Impaired   (see comments) Comments:   Orientation [] [x] Self only   Vision [x] []    Hearing [] [x] Extremely [place]- writing to communicate   Command Following [] [x] Confusion, [place]   Safety Awareness [] [x] confusion    [] []      MOBILITY: I Mod I S SBA CGA Min Mod Max Total  NT x2 Comments:   Bed Mobility    Rolling [] [] [] [] [] [x] [] [] [] [] [x]    Supine to Sit [] [] [] [] [] [x] [] [] [] [] [x]    Scooting [] [] [] [] [] [] [] [x] [] [] [x]    Sit to Supine [] [] [] [] [] [x] [] [] [] [] [x]    Transfers    Sit to Stand [] [] [] [] [] [x] [] [] [] [] [x]    Bed to Chair [] [] [] [] [] [] [x] [] [] [] [x]    Stand to Sit [] [] [] [] [] [x] [] [] [] [] [x]    I=Independent, Mod I=Modified Independent, S=Supervision, SBA=Standby Assistance, CGA=Contact Guard Assistance,   Min=Minimal Assistance, Mod=Moderate Assistance, Max=Maximal Assistance, Total=Total Assistance, NT=Not Tested  GAIT: I Mod I S SBA CGA Min Mod Max Total  NT x2 Comments:   Level of Assistance [] [] [] [] [] [] [x] [] [] [] [x]    Distance 2 ft x 2    DME Rolling Walker    Gait Quality Antalgic gait pattern,  decreased gait speed,     Weightbearing Status WBAT     I=Independent, Mod I=Modified Independent, S=Supervision, SBA=Standby Assistance, CGA=Contact Guard Assistance,   Min=Minimal Assistance, Mod=Moderate Assistance, Max=Maximal Assistance, Total=Total Assistance, NT=Not Tested    [address] [facility] [address] [address] [facility] 9 Clicks   Basic Mobility Inpatient Short Form       How much difficulty does the patient currently have. .. Unable A Lot A Little None   1. Turning over in bed (including adjusting bedclothes, sheets and blankets)? [] 1   [] 2   [x] 3   [] 4   2. Sitting down on and standing up from a chair with arms ( e.g., wheelchair, bedside commode, etc.)   [] 1   [x] 2   [] 3   [] 4   3. Moving from lying on back to sitting on the side of the bed? [] 1   [x] 2   [] 3   [] 4   How much help from another person does the patient currently need. .. Total A Lot A Little None   4. Moving to and from a bed to a chair (including a wheelchair)? [] 1   [x] 2   [] 3   [] 4   5. Need to walk in hospital room? [] 1   [x] 2   [] 3   [] 4   6. Climbing 3-5 steps with a railing? [] 1   [x] 2   [] 3   [] 4   © 2007, Trustees of [facility] [employer], under license to [employer]. All rights reserved     Score:  Initial: 13 Most Recent: X (Date: -- )    Interpretation of Tool:  Represents activities that are increasingly more difficult (i.e. Bed mobility, Transfers, Gait). PLAN:   FREQUENCY/DURATION: PT Plan of Care: BID for duration of hospital stay or until stated goals are met, whichever comes first.    PROBLEM LIST:   (Skilled intervention is medically necessary to address:)  1. Decreased ADL/Functional Activities  2. Decreased Activity Tolerance  3. Decreased AROM/PROM  4. Decreased Balance  5. Decreased Cognition  6. Decreased Gait Ability  7. Decreased Strength  8. Decreased Transfer Abilities  9. Increased Pain   INTERVENTIONS PLANNED:   (Benefits and precautions of physical therapy have been discussed with the patient.)  1. Therapeutic Activity  2. Therapeutic Exercise/HEP  3. Neuromuscular Re-education  4. Gait Training  5.  Education     TREATMENT:     EVALUATION: Moderate Complexity : (Untimed Charge)    TREATMENT:   ($$ Therapeutic Activity: 23-37 mins    )  Co-Treatment PT/OT necessary due to patient's decreased overall endurance/tolerance levels, as well as need for high level skilled assistance to complete functional transfers/mobility and functional tasks  Therapeutic Activity (23 Minutes): Therapeutic activity included Rolling, Supine to Sit, Sit to Supine, Scooting, Transfer Training, Ambulation on level ground, Sitting balance  and Standing balance to improve functional Mobility, Strength and Activity tolerance.     TREATMENT GRID:  N/A    AFTER TREATMENT POSITION/PRECAUTIONS:  Alarm Activated, Bed, Needs within reach, RN notified and Visitors at bedside    INTERDISCIPLINARY COLLABORATION:  RN/PCT and OT/ROY    TOTAL TREATMENT DURATION:  PT Patient Time In/Time Out  Time In: 0903  Time Out: [address] [facility],[address], PT

## 2021-11-18 NOTE — PROGRESS NOTES
ACUTE PHYSICAL THERAPY GOALS:  (Developed with and agreed upon by patient and/or caregiver.)     (1.) Elridge Hamman Brace  will move from supine to sit and sit to supine  with SUPERVISION within 7 treatment day(s). (2.) Alejandro Villagran will transfer from bed to chair and chair to bed with SUPERVISION using the least restrictive device within 7 treatment day(s). (3.) Alejandro Villagran will ambulate with SUPERVISION for 50 feet with the least restrictive device within 7 treatment day(s). (4.) Alejandro Villagran will perform standing static and dynamic balance activities x 20 minutes with SUPERVISION to improve safety within 7 treatment day(s). (5.) Alejandro Villagran will perform bilateral lower extremity exercises x 20 min for HEP with SUPERVISION to improve strength, endurance, and functional mobility within 7 treatment day(s). PHYSICAL THERAPY: Daily Note and PM Treatment Day # 1    Alejandro Villagran is a 80 y.o. female   PRIMARY DIAGNOSIS: Intertrochanteric fracture of left femur ([facility])  Intertrochanteric fracture of left femur ([facility]) [S72.142A]  Procedure(s) (LRB):  OPEN TREATMENT OF LEFT PROXIMAL FEMUR FRACTURE WITH GAMMA INTRAMEDULLARY NAIL (Left)  1 Day Post-Op    ASSESSMENT:     REHAB RECOMMENDATIONS: CURRENT LEVEL OF FUNCTION:  (Most Recently Demonstrated)   Recommendation to date pending progress:  Setting:   Short-term Rehab  Equipment:    To Be Determined Bed Mobility:   Minimal Assistance x 2  Sit to Stand:   Moderate Assistance x 2  Transfers:   Moderate Assistance x 2  Gait/Mobility:   Moderate Assistance x 2     ASSESSMENT:  Ms. Giles Han was received supine in bed with cochlear implants in place this PM. She was noticeably in more pain this PM but able to sit at EOB with Valeria of 2. She required modA of 2 to stand and transfer to bedside chair with RW. Pt required more encouragement to participate than this morning given increased pain- RN informed. Some progress towards goals, will continue to follow. SUBJECTIVE:   Ms. Dee Mcfarland states, \"I hurt all over. \"    SOCIAL HISTORY/ LIVING ENVIRONMENT: see eval  Support Systems: Child(justin) (daughter Johnny Mckenna [phone])  OBJECTIVE:     PAIN: VITAL SIGNS: LINES/DRAINS:   Pre Treatment: Pain Screen  Pain Scale 1: Visual  Pain Intensity 1: 6  Pain Onset 1: post op  Pain Location 1: Hip  Pain Orientation 1: Left  Pain Intervention(s) 1: Ambulation/Increased Activity; Repositioned  Post Treatment: 4   none  O2 Device: Nasal cannula     MOBILITY: I Mod I S SBA CGA Min Mod Max Total  NT x2 Comments:   Bed Mobility    Rolling [] [] [] [] [] [x] [] [] [] [] [x]    Supine to Sit [] [] [] [] [] [x] [] [] [] [] [x]    Scooting [] [] [] [] [] [x] [] [] [] [] [x]    Sit to Supine [] [] [] [] [] [] [] [] [] [x] []    Transfers    Sit to Stand [] [] [] [] [] [] [x] [] [] [] [x]    Bed to Chair [] [] [] [] [] [] [x] [] [] [] [x]    Stand to Sit [] [] [] [] [] [] [x] [] [] [] [x]    I=Independent, Mod I=Modified Independent, S=Supervision, SBA=Standby Assistance, CGA=Contact Guard Assistance,   Min=Minimal Assistance, Mod=Moderate Assistance, Max=Maximal Assistance, Total=Total Assistance, NT=Not Tested    BALANCE: Good Fair+ Fair Fair- Poor NT Comments   Sitting Static [] [x] [] [] [] []    Sitting Dynamic [] [] [x] [] [] []              Standing Static [] [] [x] [] [] []    Standing Dynamic [] [] [] [x] [] []      GAIT: I Mod I S SBA CGA Min Mod Max Total  NT x2 Comments:   Level of Assistance [] [] [] [] [] [] [x] [] [] [] [x]    Distance SPT    DME Rolling Walker    Gait Quality Forward flexed posture, antalgic gait pattern, cues for proper use of RW    Weightbearing  Status WBAT     I=Independent, Mod I=Modified Independent, S=Supervision, SBA=Standby Assistance, CGA=Contact Guard Assistance,   Min=Minimal Assistance, Mod=Moderate Assistance, Max=Maximal Assistance, Total=Total Assistance, NT=Not Tested    PLAN:   FREQUENCY/DURATION: PT Plan of Care: BID for duration of hospital stay or until stated goals are met, whichever comes first.  TREATMENT:     TREATMENT:   ($$ Therapeutic Activity: 8-22 mins    )  Therapeutic Activity (13 Minutes): Therapeutic activity included Rolling, Supine to Sit, Scooting, Transfer Training, Ambulation on level ground, Sitting balance  and Standing balance to improve functional Mobility, Strength, ROM and Activity tolerance.     TREATMENT GRID:  N/A    AFTER TREATMENT POSITION/PRECAUTIONS:  Chair, Needs within reach and RN notified    INTERDISCIPLINARY COLLABORATION:  RN/PCT and Rehab Attendant     TOTAL TREATMENT DURATION:  PT Patient Time In/Time Out  Time In: 1312  Time Out: [address], PT

## 2021-11-18 NOTE — PROGRESS NOTES
CM reviewed chart for ongoing discharge planning. Upon review of chart, CM noted that therapy services did have some difficulty communicating with patient secondary to cochlear implants and written communication. Call placed to patient's daughter and message left. Patient currently resides in [facility] [place] [facility]. Therapy is recommending STR at discharge. Patient has [employer] and will need insurance approval prior to discharge. PPD in progress and Covid completed. CM will continue to follow for ongoing discharge planning as in network STR facility choices will need to be made by patient and family.

## 2021-11-18 NOTE — PROGRESS NOTES
Problem: Falls - Risk of  Goal: *Absence of Falls  Description: Document Artur Mon Fall Risk and appropriate interventions in the flowsheet. 11/17/2021 2057 by Glenna Santoro RN  Outcome: Progressing Towards Goal  Note: Fall Risk Interventions:  Mobility Interventions: Bed/chair exit alarm    Mentation Interventions: Bed/chair exit alarm    Medication Interventions: Bed/chair exit alarm    Elimination Interventions: Bed/chair exit alarm    History of Falls Interventions: Bed/chair exit alarm      11/17/2021 2057 by Glenna Santoro RN  Outcome: Progressing Towards Goal  Note: Fall Risk Interventions:  Mobility Interventions: Bed/chair exit alarm    Mentation Interventions: Bed/chair exit alarm    Medication Interventions: Bed/chair exit alarm    Elimination Interventions: Bed/chair exit alarm    History of Falls Interventions: Bed/chair exit alarm         Problem: Patient Education: Go to Patient Education Activity  Goal: Patient/Family Education  11/17/2021 2057 by Glenna Santoro RN  Outcome: Progressing Towards Goal  11/17/2021 2057 by Glenna Santoro RN  Outcome: Progressing Towards Goal     Problem: Pressure Injury - Risk of  Goal: *Prevention of pressure injury  Description: Document Srinivas Scale and appropriate interventions in the flowsheet.   11/17/2021 2057 by Glenna Santoro RN  Outcome: Progressing Towards Goal  Note: Pressure Injury Interventions:  Sensory Interventions: Assess changes in LOC         Activity Interventions: Pressure redistribution bed/mattress(bed type)    Mobility Interventions: Pressure redistribution bed/mattress (bed type)    Nutrition Interventions: Document food/fluid/supplement intake                  11/17/2021 2057 by Glenna Santoro RN  Outcome: Progressing Towards Goal  Note: Pressure Injury Interventions:  Sensory Interventions: Assess changes in LOC         Activity Interventions: Pressure redistribution bed/mattress(bed type)    Mobility Interventions: Pressure redistribution bed/mattress (bed type)    Nutrition Interventions: Document food/fluid/supplement intake                     Problem: Patient Education: Go to Patient Education Activity  Goal: Patient/Family Education  11/17/2021 2057 by Salvador Meredith RN  Outcome: Progressing Towards Goal  11/17/2021 2057 by Salvador Meredith, RN  Outcome: Progressing Towards Goal     Problem: Patient Education: Go to Patient Education Activity  Goal: Patient/Family Education  Outcome: Progressing Towards Goal

## 2021-11-18 NOTE — PROGRESS NOTES
2021         Post Op day: 1 Day Post-Op Procedure(s) (LRB):  OPEN TREATMENT OF LEFT PROXIMAL FEMUR FRACTURE WITH GAMMA INTRAMEDULLARY NAIL (Left)      Admit Date: 2021  Admit Diagnosis: Intertrochanteric fracture of left femur ([address] [S72.142A]       Principle Problem: Intertrochanteric fracture of left femur ([address] Subjective: Doing well, No complaints, No SOB, No Chest Pain, No Nausea or Vomiting     Objective:   Vital Signs are Stable, No Acute Distress, Alert,  Dressing is Dry,  Neurovascular exam is normal.     Assessment / Plan :  Patient Active Problem List   Diagnosis Code    Essential hypertension I10    Vertigo R42    Type 2 diabetes mellitus without complication, without long-term current use of insulin ([facility]) E11.9    Bilateral hearing loss H91.93    Seasonal allergic rhinitis due to pollen J30.1    Memory difficulties R41.3    Anxiety F41.9    Intertrochanteric fracture of left femur ([facility]) S72.142A    Hypokalemia E87.6      Patient Vitals for the past 8 hrs:   BP Temp Pulse Resp SpO2   21 0728 136/69 97.5 °F (36.4 °C) 85 18 96 %   21 0422 110/70 98 °F (36.7 °C) 91 18 97 %    Temp (24hrs), Av.4 °F (36.9 °C), Min:97.5 °F (36.4 °C), Max:99.1 °F (37.3 °C)    Body mass index is 23.3 kg/m².     Lab Results   Component Value Date/Time    HGB 8.8 (L) 2021 05:20 AM          S/P Procedure(s) (LRB):  OPEN TREATMENT OF LEFT PROXIMAL FEMUR FRACTURE WITH GAMMA INTRAMEDULLARY NAIL (Left)      Medical Mgmt per hospitalist  Anticoagulation plan: ASA 325mg daily  Continue PT  Fall Precautions  DC disp: rehab placement  F/U: 2 weeks postop for wound check and staple removal        Signed By: TOSIN Paz  2021,  9:49 AM

## 2021-11-18 NOTE — PROGRESS NOTES
ACUTE OT GOALS:  (Developed with and agreed upon by patient and/or caregiver.)  1. Patient will complete functional transfers with contact guard assistance with adaptive equipment as needed. 2. Patient will complete lower body bathing and dressing with minimal assistance and adaptive equipment as needed. 3. Patient will complete toileting and toilet transfer with minimal assistance. 4. Patient will tolerate 30 minutes of OT treatment with self- incorporated rest breaks to increase activity tolerance for ADLs. 5. Patient will participate in at least 15 minutes of BUE therapeutic exercises to strengthen BUE for functional transfers.      Timeframe: 7 visits      OCCUPATIONAL THERAPY ASSESSMENT: Initial Assessment and Daily Note OT Treatment Day # 1    Claudeen Chard is a 80 y.o. female   PRIMARY DIAGNOSIS: Intertrochanteric fracture of left femur ([address]  Intertrochanteric fracture of left femur ([address] [S72.142A]  Procedure(s) (LRB):  OPEN TREATMENT OF LEFT PROXIMAL FEMUR FRACTURE WITH GAMMA INTRAMEDULLARY NAIL (Left)  1 Day Post-Op  Reason for Referral:   ICD-10: Treatment Diagnosis: Pain in left hip (M25.552)  Stiffness of Left Hip, Not elsewhere classified (M25.652)  Difficulty in walking, Not elsewhere classified (R26.2)  Other abnormalities of gait and mobility (R26.89)  Generalized Muscle Weakness (M62.81)  History of falling (Z91.81)  INPATIENT: Payor: [employer] MEDICARE / Plan: [facility] [employer] MEDICARE HMO / Product Type: Managed Care Medicare /   ASSESSMENT:     REHAB RECOMMENDATIONS:   Recommendation to date pending progress:  Setting:   Short-term Rehab  Equipment:    To Be Determined     PRIOR LEVEL OF FUNCTION:  (Prior to Hospitalization) *per CM note & conversation with daughter--from [facility], ind with ADLs prior* INITIAL/CURRENT LEVEL OF FUNCTION:  (Based on today's evaluation)   Bathing:   Independent  Dressing:   Independent  Feeding/Grooming:   Independent  Toileting:   Independent  Functional Mobility:   Modified Independent (RW) Bathing:   Maximal Assistance  Dressing:   Maximal Assistance  Feeding/Grooming:   Set Up  Toileting:   Maximal Assistance  Functional Mobility:   Moderate Assistance x 2 (RW)     ASSESSMENT:  Ms. Karen Hussein presents s/p fall at [facility] resulting in L proximal femur fx. She is POD1 of procedure above. At baseline, pt lives in memory care facility at [facility]. Per CM note in speaking with daughter, pt is normally independent with ADLs and mod I (RW) with mobility. Today, pt supine in bed upon arrival with RN at bedside. Pt without her cochlear implants at this time and is deaf. Therapists communicated through writing during session. Pt was able to verbally respond appropriately. She was able to perform bed<>BSC with RW and mod A x2. Max to perform toilet hygiene and don brief. Pt with short term memory deficits. Therapy recommends STR upon discharge. She is functioning well below her baseline and will benefit from continued skilled OT during hospital stay. SUBJECTIVE:   Ms. Karen Hussein states, \"I haven't gone in about three days. \"    SOCIAL HISTORY/LIVING ENVIRONMENT: Lives in [facility], independent with ADLs, RW for mobility, support from staff and children, Deaf without cochlear implants, does not know sign language.   Support Systems: Child(justin) (daughter Monalisa Redd [phone])    OBJECTIVE:     PAIN: VITAL SIGNS: LINES/DRAINS:   Pre Treatment: Pain Screen  Pain Scale 1: Visual  Pain Intensity 1: 4  Pain Location 1: Hip  Pain Orientation 1: Left  Post Treatment: 4   None  O2 Device: Nasal cannula     GROSS EVALUATION:  B UE Within Functional Limits Abnormal/ Functional Abnormal/ Non-Functional (see comments) Not Tested Comments:   AROM [x] [] [] []    PROM [x] [] [] []    Strength [] [x] [] [] Generally decreased, but functional   Balance [] [] [x] [] Good balance sitting EOB, support from RW and therapists in standing   Posture [] [] [x] [] Good posture sitting EOB, support through RW and therapists in standing   Sensation [x] [] [] []    Coordination [x] [] [] []    Tone [] [] [] []    Edema [] [] [] []    Activity Tolerance [] [] [x] [] Decreased since fall    [] [] [] []      COGNITION/  PERCEPTION: Intact Impaired   (see comments) Comments:   Orientation [] [x] A&O x3, does not know year, (might change throughout the day)   Vision [x] []    Hearing [] [x] Deaf without cochlear implants, communicated through writing, she was able to respond appropriately    Judgment/ Insight [] [x] Lack insight into deficits   Attention [] [x]    Memory [] [x] Dementia and short term memory loss evident throughout session   Command Following [x] []    Emotional Regulation [x] []     [] []      ACTIVITIES OF DAILY LIVING: I Mod I S SBA CGA Min Mod Max Total NT Comments   BASIC ADLs:              Bathing/ Showering [] [] [] [] [] [] [] [] [] [x]    Toileting [] [] [] [] [] [] [] [x] [] [] [facility] [] [] [] [] [] [] [] [] [] [x]    Feeding [] [] [] [] [] [] [] [] [] [x]    Grooming [] [] [x] [] [] [] [] [] [] [] Setup, face washing   Personal Device Care [] [] [] [] [] [] [] [] [] [x]    Functional Mobility [] [] [] [] [] [] [x] [] [] [] x2 RW   I=Independent, Mod I=Modified Independent, S=Supervision, SBA=Standby Assistance, CGA=Contact Guard Assistance,   Min=Minimal Assistance, Mod=Moderate Assistance, Max=Maximal Assistance, Total=Total Assistance, NT=Not Tested    MOBILITY: I Mod I S SBA CGA Min Mod Max Total  NT x2 Comments:   Supine to sit [] [] [] [] [] [] [x] [] [] [] [x]    Sit to supine [] [] [] [] [] [] [x] [] [] [] [x]    Sit to stand [] [] [] [] [] [] [x] [] [] [] [x] RW   Bed to chair [] [] [] [] [] [] [x] [] [] [] [x] To BSC, RW   I=Independent, Mod I=Modified Independent, S=Supervision, SBA=Standby Assistance, CGA=Contact Guard Assistance,   Min=Minimal Assistance, Mod=Moderate Assistance, Max=Maximal Assistance, Total=Total Assistance, NT=[facility] AM-PAC 6 Clicks   Daily Activity Inpatient Short Form        How much help from another person does the patient currently need. .. Total A Lot A Little None   1. Putting on and taking off regular lower body clothing? [x] 1   [] 2   [] 3   [] 4   2. Bathing (including washing, rinsing, drying)? [x] 1   [] 2   [] 3   [] 4   3. Toileting, which includes using toilet, bedpan or urinal?   [] 1   [x] 2   [] 3   [] 4   4. Putting on and taking off regular upper body clothing? [] 1   [] 2   [x] 3   [] 4   5. Taking care of personal grooming such as brushing teeth? [] 1   [] 2   [] 3   [x] 4   6. Eating meals? [] 1   [] 2   [] 3   [x] 4   © 2007, Trustees of [address] [place] [id number], under license to [employer]. All rights reserved     Score:  Initial: 15 Most Recent: X (Date: -- )   Interpretation of Tool:  Represents activities that are increasingly more difficult (i.e. Bed mobility, Transfers, Gait). PLAN:   FREQUENCY/DURATION: OT Plan of Care: 5 times/week for duration of hospital stay or until stated goals are met, whichever comes first.    PROBLEM LIST:   (Skilled intervention is medically necessary to address:)  1. Decreased ADL/Functional Activities  2. Decreased Activity Tolerance  3. Decreased AROM/PROM  4. Decreased Balance  5. Decreased Cognition  6. Decreased Coordination  7. Decreased Gait Ability  8. Decreased Strength  9. Decreased Transfer Abilities   INTERVENTIONS PLANNED:   (Benefits and precautions of occupational therapy have been discussed with the patient.)  1. Self Care Training  2. Therapeutic Activity  3. Therapeutic Exercise/HEP  4. Neuromuscular Re-education  5.  Education     TREATMENT:     EVALUATION: Moderate Complexity : (Untimed Charge)    TREATMENT:   ($$ Self Care/Home Management: 23-37 mins    )  Co-Treatment PT/OT necessary due to patient's decreased overall endurance/tolerance levels, as well as need for high level skilled assistance to complete functional transfers/mobility and functional tasks  Self Care (30 Minutes): Self care including Toileting, Grooming and functional mobility to increase independence and decrease level of assistance required.     TREATMENT GRID:  N/A    AFTER TREATMENT POSITION/PRECAUTIONS:  Alarm Activated, Bed, Needs within reach and RN notified    INTERDISCIPLINARY COLLABORATION:  RN/PCT and PT/PTA    TOTAL TREATMENT DURATION:  OT Patient Time In/Time Out  Time In: 0903  Time Out: William Eden OT

## 2021-11-18 NOTE — DISCHARGE INSTRUCTIONS
[address]    IF YOU HAVE ANY PROBLEMS ONCE YOU ARE AT HOME CALL THE FOLLOWING NUMBERS:   Main office number: [phone] ask for Carlotta Cardona (medical assistant with Dr. James Aoyn)  Office Address: [facility] [address] [address],[address], [address] [facility]      Patient Discharge Instructions    Addie Tian / [id number] : 1935    Admitted 2021 Discharged: 2021         To be given to [address] on Admission         Weight bearing status: As tolerated with walker and assistance    Activity  · Continue Physical Therapy and Occupational Therapy   · Fall precautions     Wound Care   Dry dressing changes using sterile technique every other day or more frequently if needed    Staples are to be left in and removed in our office 2 weeks postop    Diet  · Resume regular or diabetic diet      Medications    · Patient medications are listed on the medication reconciliation sheet. Follow up    Follow up in our office in 2 weeks postop    All patients are to be transported via stretcher unless they are able to independently get out of a chair and stand without assistance. Information obtained by :  I understand that if any problems occur once I am at home I am to contact my physician. I understand and acknowledge receipt of the instructions indicated above.                                                                                                                                            Physician's or R.N.'s Signature                                                                  Date/Time                                                                                                                                              Patient or Representative Signature                                                          Date/Time

## 2021-11-19 LAB
ANION GAP SERPL CALC-SCNC: 6 MMOL/L (ref 7–16)
APPEARANCE UR: ABNORMAL
BACTERIA URNS QL MICRO: ABNORMAL /HPF
BASOPHILS # BLD: 0 K/UL (ref 0–0.2)
BASOPHILS NFR BLD: 0 % (ref 0–2)
BILIRUB UR QL: NEGATIVE
BUN SERPL-MCNC: 12 MG/DL (ref 8–23)
CALCIUM SERPL-MCNC: 8.5 MG/DL (ref 8.3–10.4)
CASTS URNS QL MICRO: ABNORMAL /LPF
CHLORIDE SERPL-SCNC: 104 MMOL/L (ref 98–107)
CO2 SERPL-SCNC: 32 MMOL/L (ref 21–32)
COLOR UR: ABNORMAL
CREAT SERPL-MCNC: 0.69 MG/DL (ref 0.6–1)
DIFFERENTIAL METHOD BLD: ABNORMAL
EOSINOPHIL # BLD: 0.1 K/UL (ref 0–0.8)
EOSINOPHIL NFR BLD: 1 % (ref 0.5–7.8)
EPI CELLS #/AREA URNS HPF: ABNORMAL /HPF
ERYTHROCYTE [DISTWIDTH] IN BLOOD BY AUTOMATED COUNT: 14.2 % (ref 11.9–14.6)
GLUCOSE SERPL-MCNC: 163 MG/DL (ref 65–100)
GLUCOSE UR STRIP.AUTO-MCNC: NEGATIVE MG/DL
HCT VFR BLD AUTO: 25.8 % (ref 35.8–46.3)
HGB BLD-MCNC: 8.3 G/DL (ref 11.7–15.4)
HGB UR QL STRIP: NEGATIVE
IMM GRANULOCYTES # BLD AUTO: 0 K/UL (ref 0–0.5)
IMM GRANULOCYTES NFR BLD AUTO: 0 % (ref 0–5)
KETONES UR QL STRIP.AUTO: NEGATIVE MG/DL
LEUKOCYTE ESTERASE UR QL STRIP.AUTO: NEGATIVE
LYMPHOCYTES # BLD: 0.9 K/UL (ref 0.5–4.6)
LYMPHOCYTES NFR BLD: 12 % (ref 13–44)
MCH RBC QN AUTO: 30.2 PG (ref 26.1–32.9)
MCHC RBC AUTO-ENTMCNC: 32.2 G/DL (ref 31.4–35)
MCV RBC AUTO: 93.8 FL (ref 79.6–97.8)
MM INDURATION POC: 0 MM (ref 0–5)
MONOCYTES # BLD: 0.7 K/UL (ref 0.1–1.3)
MONOCYTES NFR BLD: 10 % (ref 4–12)
MUCOUS THREADS URNS QL MICRO: ABNORMAL /LPF
NEUTS SEG # BLD: 5.3 K/UL (ref 1.7–8.2)
NEUTS SEG NFR BLD: 76 % (ref 43–78)
NITRITE UR QL STRIP.AUTO: NEGATIVE
NRBC # BLD: 0 K/UL (ref 0–0.2)
OTHER OBSERVATIONS,UCOM: ABNORMAL
PH UR STRIP: 6 [PH] (ref 5–9)
PLATELET # BLD AUTO: 164 K/UL (ref 150–450)
PMV BLD AUTO: 10.3 FL (ref 9.4–12.3)
POTASSIUM SERPL-SCNC: 3.6 MMOL/L (ref 3.5–5.1)
PPD POC: NEGATIVE NEGATIVE
PROT UR STRIP-MCNC: 30 MG/DL
RBC # BLD AUTO: 2.75 M/UL (ref 4.05–5.2)
RBC #/AREA URNS HPF: ABNORMAL /HPF
SODIUM SERPL-SCNC: 142 MMOL/L (ref 136–145)
SP GR UR REFRACTOMETRY: 1.02 (ref 1–1.02)
UROBILINOGEN UR QL STRIP.AUTO: 0.2 EU/DL (ref 0.2–1)
WBC # BLD AUTO: 7 K/UL (ref 4.3–11.1)
WBC URNS QL MICRO: ABNORMAL /HPF

## 2021-11-19 PROCEDURE — 97530 THERAPEUTIC ACTIVITIES: CPT

## 2021-11-19 PROCEDURE — 65270000029 HC RM PRIVATE

## 2021-11-19 PROCEDURE — 97110 THERAPEUTIC EXERCISES: CPT

## 2021-11-19 PROCEDURE — 74011250637 HC RX REV CODE- 250/637: Performed by: HOSPITALIST

## 2021-11-19 PROCEDURE — 81001 URINALYSIS AUTO W/SCOPE: CPT

## 2021-11-19 PROCEDURE — 85025 COMPLETE CBC W/AUTO DIFF WBC: CPT

## 2021-11-19 PROCEDURE — 74011250637 HC RX REV CODE- 250/637: Performed by: FAMILY MEDICINE

## 2021-11-19 PROCEDURE — 80048 BASIC METABOLIC PNL TOTAL CA: CPT

## 2021-11-19 PROCEDURE — 74011250637 HC RX REV CODE- 250/637: Performed by: ORTHOPAEDIC SURGERY

## 2021-11-19 PROCEDURE — 74011250636 HC RX REV CODE- 250/636: Performed by: FAMILY MEDICINE

## 2021-11-19 PROCEDURE — 97116 GAIT TRAINING THERAPY: CPT

## 2021-11-19 PROCEDURE — 36415 COLL VENOUS BLD VENIPUNCTURE: CPT

## 2021-11-19 RX ORDER — CLONIDINE HYDROCHLORIDE 0.2 MG/1
0.2 TABLET ORAL
Status: DISCONTINUED | OUTPATIENT
Start: 2021-11-19 | End: 2021-11-23 | Stop reason: HOSPADM

## 2021-11-19 RX ORDER — HYDRALAZINE HYDROCHLORIDE 20 MG/ML
10 INJECTION INTRAMUSCULAR; INTRAVENOUS
Status: DISCONTINUED | OUTPATIENT
Start: 2021-11-19 | End: 2021-11-23 | Stop reason: HOSPADM

## 2021-11-19 RX ORDER — POTASSIUM CHLORIDE 20 MEQ/1
40 TABLET, EXTENDED RELEASE ORAL
Status: COMPLETED | OUTPATIENT
Start: 2021-11-19 | End: 2021-11-19

## 2021-11-19 RX ORDER — MORPHINE SULFATE 2 MG/ML
2 INJECTION, SOLUTION INTRAMUSCULAR; INTRAVENOUS
Status: DISCONTINUED | OUTPATIENT
Start: 2021-11-19 | End: 2021-11-23 | Stop reason: HOSPADM

## 2021-11-19 RX ADMIN — Medication 1 TABLET: at 18:14

## 2021-11-19 RX ADMIN — Medication 10 ML: at 06:00

## 2021-11-19 RX ADMIN — ACETAMINOPHEN 650 MG: 325 TABLET ORAL at 18:14

## 2021-11-19 RX ADMIN — Medication 5 ML: at 14:35

## 2021-11-19 RX ADMIN — AMLODIPINE BESYLATE 10 MG: 10 TABLET ORAL at 08:58

## 2021-11-19 RX ADMIN — POTASSIUM CHLORIDE 40 MEQ: 20 TABLET, EXTENDED RELEASE ORAL at 12:12

## 2021-11-19 RX ADMIN — Medication 10 ML: at 22:11

## 2021-11-19 RX ADMIN — Medication 1 TABLET: at 12:12

## 2021-11-19 RX ADMIN — HYDROCODONE BITARTRATE AND ACETAMINOPHEN 1 TABLET: 7.5; 325 TABLET ORAL at 08:58

## 2021-11-19 RX ADMIN — ONDANSETRON 4 MG: 4 TABLET, ORALLY DISINTEGRATING ORAL at 08:58

## 2021-11-19 RX ADMIN — MEMANTINE 5 MG: 5 TABLET ORAL at 08:58

## 2021-11-19 RX ADMIN — ACETAMINOPHEN 650 MG: 325 TABLET ORAL at 12:12

## 2021-11-19 RX ADMIN — Medication 1 TABLET: at 08:58

## 2021-11-19 RX ADMIN — POLYETHYLENE GLYCOL 3350 17 G: 17 POWDER, FOR SOLUTION ORAL at 08:59

## 2021-11-19 RX ADMIN — ASPIRIN 325 MG: 325 TABLET, COATED ORAL at 08:58

## 2021-11-19 RX ADMIN — ONDANSETRON 4 MG: 2 INJECTION INTRAMUSCULAR; INTRAVENOUS at 14:40

## 2021-11-19 NOTE — PROGRESS NOTES
ACUTE PHYSICAL THERAPY GOALS:  (Developed with and agreed upon by patient and/or caregiver.)     (1.) Ruslan Zhao  will move from supine to sit and sit to supine  with SUPERVISION within 7 treatment day(s). (2.) Serafin Joshi will transfer from bed to chair and chair to bed with SUPERVISION using the least restrictive device within 7 treatment day(s). (3.) Serafin Joshi will ambulate with SUPERVISION for 50 feet with the least restrictive device within 7 treatment day(s). (4.) Serafin Joshi will perform standing static and dynamic balance activities x 20 minutes with SUPERVISION to improve safety within 7 treatment day(s). (5.) Serafin Joshi will perform bilateral lower extremity exercises x 20 min for HEP with SUPERVISION to improve strength, endurance, and functional mobility within 7 treatment day(s). PHYSICAL THERAPY: Daily Note and PM Treatment Day # 2    Serafin Joshi is a 80 y.o. female   PRIMARY DIAGNOSIS: Intertrochanteric fracture of left femur (HCC)  Intertrochanteric fracture of left femur (HCC) [S72.142A]  Procedure(s) (LRB):  OPEN TREATMENT OF LEFT PROXIMAL FEMUR FRACTURE WITH GAMMA INTRAMEDULLARY NAIL (Left)  2 Days Post-Op    ASSESSMENT:     REHAB RECOMMENDATIONS: CURRENT LEVEL OF FUNCTION:  (Most Recently Demonstrated)   Recommendation to date pending progress:  Setting:   Short-term Rehab  Equipment:    To Be Determined Bed Mobility:   Maximal Assistance x 2  Sit to Stand:   Maximal Assistance x 2  Transfers:   Maximal Assistance x 2  Gait/Mobility:   Moderate Assistance x 2     ASSESSMENT:  Ms. Gaviota Miller was in the chair from AM treatment with cochlear implants in place. She required more a than this AM. Attempted to stand with RW but pt unable to and screaming out in pain. Stood after several attempts with HHA and was able to pivot to sit on BSC. She was eventually able to void. She stood from  [facility] and bed was pushed behind her.  She was able to take two steps back to bed before sitting EOB. EOB to supine with total assist. Pt appears fearful. SUBJECTIVE:   Ms. Lucia Vitale states \"I need to take my pills but I don't want to\".     SOCIAL HISTORY/ LIVING ENVIRONMENT: PLOF: per chat review came from [facility], was ind with   ADL's, using RW for ambulation    Support Systems: Child(justin) (daughter Thad Meredith [phone])  OBJECTIVE:     PAIN: VITAL SIGNS: LINES/DRAINS:   Pre Treatment:    Post Treatment: 4   none  O2 Device: Nasal cannula 4 L     MOBILITY: I Mod I S SBA CGA Min Mod Max Total  NT x2 Comments:   Bed Mobility    Rolling [] [] [] [] [] [] [] [] [] [] []    Supine to Sit [] [] [] [] [] [] [] [x] [] [] [x]    Scooting [] [] [] [] [] [] [x] [] [] [] [x]    Sit to Supine [] [] [] [] [] [] [] [] [] [x] []    Transfers    Sit to Stand [] [] [] [] [] [] [] [x] [] [] [x]    Bed to Chair [] [] [] [] [] [] [] [x] [] [] [x]    Stand to Sit [] [] [] [] [] [] [] [x] [] [] [x]    I=Independent, Mod I=Modified Independent, S=Supervision, SBA=Standby Assistance, CGA=Contact Guard Assistance,   Min=Minimal Assistance, Mod=Moderate Assistance, Max=Maximal Assistance, Total=Total Assistance, NT=Not Tested    BALANCE: Good Fair+ Fair Fair- Poor NT Comments   Sitting Static [] [x] [] [] [] []    Sitting Dynamic [] [x] [] [] [] []              Standing Static [] [] [] [x] [] []    Standing Dynamic [] [] [] [x] [] []      GAIT: I Mod I S SBA CGA Min Mod Max Total  NT x2 Comments:   Level of Assistance [] [] [] [] [] [] [x] [] [] [] [x]    Distance 2    DME Rolling Walker    Gait Quality Forward flexed posture, antalgic gait pattern, cues for proper use of RW    Weightbearing  Status WBAT     I=Independent, Mod I=Modified Independent, S=Supervision, SBA=Standby Assistance, CGA=Contact Guard Assistance,   Min=Minimal Assistance, Mod=Moderate Assistance, Max=Maximal Assistance, Total=Total Assistance, NT=Not Tested    PLAN:   FREQUENCY/DURATION: PT Plan of Care: BID for duration of hospital stay or until stated goals are met, whichever comes first.  TREATMENT:     TREATMENT:   ($$ Gait Trainin-22 mins  $$ Therapeutic Activity: 38-52 mins  $$ Therapeutic Exercises: 8-22 mins    )  Therapeutic Activity (39 Minutes): Therapeutic activity included Sit to Supine, Scooting, Transfer Training, Ambulation on level ground, Sitting balance  and Standing balance to improve functional Mobility, Strength and Activity tolerance.     TREATMENT GRID:  N/A    AFTER TREATMENT POSITION/PRECAUTIONS:  Alarm Activated, Bed and Needs within reach    INTERDISCIPLINARY COLLABORATION:  RN/PCT and Rehab Attendant     TOTAL TREATMENT DURATION:  PT Patient Time In/Time Out  Time In: 1333  Time Out: [address] Asher, PTA

## 2021-11-19 NOTE — PROGRESS NOTES
ACUTE OT GOALS:  (Developed with and agreed upon by patient and/or caregiver.)  1. Patient will complete functional transfers with contact guard assistance with adaptive equipment as needed. 2. Patient will complete lower body bathing and dressing with minimal assistance and adaptive equipment as needed. 3. Patient will complete toileting and toilet transfer with minimal assistance. 4. Patient will tolerate 30 minutes of OT treatment with self- incorporated rest breaks to increase activity tolerance for ADLs. 5. Patient will participate in at least 15 minutes of BUE therapeutic exercises to strengthen BUE for functional transfers.      Timeframe: 7 visits        OCCUPATIONAL THERAPY: Daily Note OT Treatment Day # 2    Rosalva Covarrubias is a 80 y.o. female   PRIMARY DIAGNOSIS: Intertrochanteric fracture of left femur (HCC)  Intertrochanteric fracture of left femur ([address] [S72.142A]  Procedure(s) (LRB):  OPEN TREATMENT OF LEFT PROXIMAL FEMUR FRACTURE WITH GAMMA INTRAMEDULLARY NAIL (Left)  2 Days Post-Op  Payor: [employer] MEDICARE / Plan: [address] [place] [address] HMO / Product Type: Managed Care Medicare /   ASSESSMENT:     REHAB RECOMMENDATIONS: CURRENT LEVEL OF FUNCTION:  (Most Recently Demonstrated)   Recommendation to date pending progress:  Setting:   Short-term Rehab  Equipment:    To Be Determined Bathing:   Not tested  Dressing:   Not tested  Feeding/Grooming:   Not tested  Toileting:   Not tested  Functional Mobility:   Moderate Assistance x 2     ASSESSMENT:  Ms. Ivonne Morris is doing fair today. Pt required max A x2 for bed mobility today. Pt demonstrates fair sitting balance at edge of bed. Pt was able to stand with min/mod A x2 and take a few steps toward chair. Pt then became a little unsafe reaching for the chair and was not close to it. Chair brought to patient and required max A x2 to finish transfer. Pt performed UE exercises to increase strength and activity tolerance.  Pt tolerated exercises well with ROBERT due to having a recent fracture in LUE. Pt left in chair with all needs in reach. Minimal progress made today. Will continue to benefit from skilled OT during stay.      SUBJECTIVE:   Ms. Savanna Cohen states, \"I did it\"    SOCIAL HISTORY/LIVING ENVIRONMENT:   Support Systems: Child(justin) (daughter Hernando Louise [phone])    OBJECTIVE:     PAIN: VITAL SIGNS: LINES/DRAINS:   Pre Treatment: Pain Screen  Pain Scale 1: Numeric (0 - 10)  Pain Intensity 1: 0  Post Treatment: 0   N/A  O2 Device: Nasal cannula     ACTIVITIES OF DAILY LIVING: I Mod I S SBA CGA Min Mod Max Total NT Comments   BASIC ADLs:              Bathing/ Showering [] [] [] [] [] [] [] [] [] [x]    Toileting [] [] [] [] [] [] [] [] [] [x]    Dressing [] [] [] [] [] [] [] [] [] [x]    Feeding [] [] [] [] [] [] [] [] [] [x]    Grooming [] [] [] [] [] [] [] [] [] [x]    Personal Device Care [] [] [] [] [] [] [] [] [] [x]    Functional Mobility [] [] [] [] [] [x] [x] [] [] [] x2   I=Independent, Mod I=Modified Independent, S=Supervision, SBA=Standby Assistance, CGA=Contact Guard Assistance,   Min=Minimal Assistance, Mod=Moderate Assistance, Max=Maximal Assistance, Total=Total Assistance, NT=Not Tested    MOBILITY: I Mod I S SBA CGA Min Mod Max Total  NT x2 Comments:   Supine to sit [] [] [] [] [] [] [] [x] [] [] [x]    Sit to supine [] [] [] [] [] [] [] [] [] [x] []    Sit to stand [] [] [] [] [] [x] [x] [] [] [] [x]    Bed to chair [] [] [] [] [] [x] [x] [] [] [] [x]    I=Independent, Mod I=Modified Independent, S=Supervision, SBA=Standby Assistance, CGA=Contact Guard Assistance,   Min=Minimal Assistance, Mod=Moderate Assistance, Max=Maximal Assistance, Total=Total Assistance, NT=Not Tested    BALANCE: Good Fair+ Fair Fair- Poor NT Comments   Sitting Static [] [] [x] [] [] []    Sitting Dynamic [] [] [x] [] [] []              Standing Static [] [] [] [x] [] []    Standing Dynamic [] [] [] [] [x] []      PLAN:   FREQUENCY/DURATION: OT Plan of Care: 5 times/week for duration of hospital stay or until stated goals are met, whichever comes first.    TREATMENT:   TREATMENT:   ( $$ Therapeutic Activity: 8-22 mins  $$ Therapeutic Exercises: 8-22 mins   )  Therapeutic Activity (15 Minutes): Therapeutic activity included Supine to Sit, Transfer Training, Ambulation on level ground, Sitting balance  and Standing balance to improve functional Mobility, Strength and Activity tolerance. Therapeutic Exercise (10 Minutes): Therapeutic exercises noted below to improve functional activity tolerance, strength and mobility.      TREATMENT GRID:   Date:  11/19/21 Date:   Date:     Activity/Exercise Parameters Parameters Parameters   Scapular Retraction 10 reps     Elbow Flexion 10 reps     Punches 10 reps                               AFTER TREATMENT POSITION/PRECAUTIONS:  Alarm Activated, Chair, Needs within reach and Visitors at bedside    INTERDISCIPLINARY COLLABORATION:  RN/PCT and OT/ROY    TOTAL TREATMENT DURATION:  OT Patient Time In/Time Out  Time In: 1036  Time Out: 1106    RON Ferrell

## 2021-11-19 NOTE — PROGRESS NOTES
Progress Note    Patient: Jorge Flor MRN: [id number]  SSN: [id number]    YOB: 1935  Age: 80 y.o. Sex: female      Admit Date: 11/17/2021    LOS: 2 days     Assessment and Plan:     1. Intertrochanteric left femoral fracture, stable s/p ORIF  2. Hypokalemi, replaced  3- Hemoglobin mild drop, postop, to follow  3. HTN, controlled  4- Abnormal UA, asymptomatic    Rx:  Pain and BP control  AM lab    Dispo: rehab    Subjective:   80 y.o. female with medical history of dementia, hypertension, anxiety that presented after a fall. Patient seen and examined at bedside. This morning having some hip pain. No chest pain, no SOB, no nausea or vomiting.      Today, pain control, no cp/ sob or cough    Objective:     Vitals:    11/19/21 0347 11/19/21 0350 11/19/21 0717 11/19/21 1119   BP: 139/68  130/67 (!) 109/55   Pulse: (!) 102  85 88   Resp: 19 19 20   Temp: 98.8 °F (37.1 °C)  98.7 °F (37.1 °C) 98.8 °F (37.1 °C)   SpO2:  95% 97% 92%   Weight:       Height:            Intake and Output:  Current Shift: 11/19 0701 - 11/19 1900  In: 100 [P.O.:100]  Out: -   Last three shifts: 11/17 1901 - 11/19 0700  In: -   Out: 1200 [Urine:1200]    ROS  10 ROS negative except from stated on subjective    Physical Exam:   General: Alert, mild distress  Cardiovascular: RRR, S1, S2, no murmurs  Respiratory: Lungs are clear, no wheezes or rales  Abdomen: Soft, NT, ND  Back: No CVA tenderness, no paraspinal tenderness  Extremities: LE without pedal edema, no erythema  Neuro: no focal deficits  Skin: no rash or ulcers  Psych: good mood and affect    Lab/Data Review:  I have personally reviewed patients laboratory data showing  Recent Results (from the past 24 hour(s))   PLEASE READ & DOCUMENT PPD TEST IN 24 HRS    Collection Time: 11/18/21  4:20 PM   Result Value Ref Range    PPD Negative Negative    mm Induration 0 0 - 5 mm   CBC WITH AUTOMATED DIFF    Collection Time: 11/19/21  7:44 AM   Result Value Ref Range    WBC 7.0 4.3 - 11.1 K/uL    RBC 2.75 (L) 4.05 - 5.2 M/uL    HGB 8.3 (L) 11.7 - 15.4 g/dL    HCT 25.8 (L) 35.8 - 46.3 %    MCV 93.8 79.6 - 97.8 FL    MCH 30.2 26.1 - 32.9 PG    MCHC 32.2 31.4 - 35.0 g/dL    RDW 14.2 11.9 - 14.6 %    PLATELET 954 008 - 710 K/uL    MPV 10.3 9.4 - 12.3 FL    ABSOLUTE NRBC 0.00 0.0 - 0.2 K/uL    DF AUTOMATED      NEUTROPHILS 76 43 - 78 %    LYMPHOCYTES 12 (L) 13 - 44 %    MONOCYTES 10 4.0 - 12.0 %    EOSINOPHILS 1 0.5 - 7.8 %    BASOPHILS 0 0.0 - 2.0 %    IMMATURE GRANULOCYTES 0 0.0 - 5.0 %    ABS. NEUTROPHILS 5.3 1.7 - 8.2 K/UL    ABS. LYMPHOCYTES 0.9 0.5 - 4.6 K/UL    ABS. MONOCYTES 0.7 0.1 - 1.3 K/UL    ABS. EOSINOPHILS 0.1 0.0 - 0.8 K/UL    ABS. BASOPHILS 0.0 0.0 - 0.2 K/UL    ABS. IMM. GRANS. 0.0 0.0 - 0.5 K/UL   METABOLIC PANEL, BASIC    Collection Time: 11/19/21  7:44 AM   Result Value Ref Range    Sodium 142 136 - 145 mmol/L    Potassium 3.6 3.5 - 5.1 mmol/L    Chloride 104 98 - 107 mmol/L    CO2 32 21 - 32 mmol/L    Anion gap 6 (L) 7 - 16 mmol/L    Glucose 163 (H) 65 - 100 mg/dL    BUN 12 8 - 23 MG/DL    Creatinine 0.69 0.6 - 1.0 MG/DL    GFR est AA >60 >60 ml/min/1.73m2    GFR est non-AA >60 >60 ml/min/1.73m2    Calcium 8.5 8.3 - 10.4 MG/DL      All Micro Results     Procedure Component Value Units Date/Time    COVID-19 RAPID TEST [[id number]] Collected: 11/17/21 1012    Order Status: Completed Specimen: Nasopharyngeal Updated: 11/17/21 1042     Specimen source NASAL        Comment: RAPID ONLY        COVID-19 rapid test Not detected        Comment:      The specimen is NEGATIVE for SARS-CoV-2, the novel coronavirus associated with COVID-19. A negative result does not rule out COVID-19. This test has been authorized by the FDA under an Emergency Use Authorization (EUA) for use by authorized laboratories.         Fact sheet for Healthcare Providers: ConventionUpdate.co.nz  Fact sheet for Patients: ConventionUpdate.co.nz       Methodology: Isothermal Nucleic Acid Amplification                Image:  I have personally reviewed patients imaging showing  XR HIP LT W OR WO PELV 2-3 VWS   Final Result   Post internal fixation of a left hip fracture         NC XR TECHNOLOGIST SERVICE   Final Result      A Radiologist has not reviewed images associated with this exam.      XR FEMUR LT 2 V   Final Result   4 intraoperative C-arm views during placement of dynamic left hip   screw and intramedullary adrianne are noted reducing a proximal femoral fracture. XR HIP LT W OR WO PELV 2-3 VWS   Final Result   Negative for acute change. Mild angulation proximal left humerus   fracture. PELVIS AND LEFT HIP, 3 views. INDICATION: Hip pain following fall. TECHNIQUE: AP view of the pelvis and coned down AP and frogleg lateral of the   hip. FINDINGS:    Pelvic bony ring: Appears intact. SI joints: Appear symmetric. Pubic rami: Appear intact. Lower lumbar spine: Mild DJD      Femoral head:  Has a round smooth contour. Joint space: Mildly narrowed. Femoral neck and proximal femoral shaft:  Intertrochanteric fracture with mild   varus deformity      IMPRESSION:  Intertrochanteric fracture with mild varus deformity. LEFT FEMUR 3 view(s). INDICATION: Hip pain following fall today. TECHNIQUE: AP and lateral views. COMPARISON: None. FINDINGS / IMPRESSION: Intertrochanteric fracture. Mid and distal femoral shaft   intact. Osteopenia. XR FEMUR LT 2 V   Final Result   Negative for acute change. Mild angulation proximal left humerus   fracture. PELVIS AND LEFT HIP, 3 views. INDICATION: Hip pain following fall. TECHNIQUE: AP view of the pelvis and coned down AP and frogleg lateral of the   hip. FINDINGS:    Pelvic bony ring: Appears intact. SI joints: Appear symmetric. Pubic rami: Appear intact.    Lower lumbar spine: Mild DJD      Femoral head:  Has a round smooth contour. Joint space: Mildly narrowed. Femoral neck and proximal femoral shaft:  Intertrochanteric fracture with mild   varus deformity      IMPRESSION:  Intertrochanteric fracture with mild varus deformity. LEFT FEMUR 3 view(s). INDICATION: Hip pain following fall today. TECHNIQUE: AP and lateral views. COMPARISON: None. FINDINGS / IMPRESSION: Intertrochanteric fracture. Mid and distal femoral shaft   intact. Osteopenia. XR CHEST SNGL V   Final Result   Negative for acute change. Mild angulation proximal left humerus   fracture. PELVIS AND LEFT HIP, 3 views. INDICATION: Hip pain following fall. TECHNIQUE: AP view of the pelvis and coned down AP and frogleg lateral of the   hip. FINDINGS:    Pelvic bony ring: Appears intact. SI joints: Appear symmetric. Pubic rami: Appear intact. Lower lumbar spine: Mild DJD      Femoral head:  Has a round smooth contour. Joint space: Mildly narrowed. Femoral neck and proximal femoral shaft:  Intertrochanteric fracture with mild   varus deformity      IMPRESSION:  Intertrochanteric fracture with mild varus deformity. LEFT FEMUR 3 view(s). INDICATION: Hip pain following fall today. TECHNIQUE: AP and lateral views. COMPARISON: None. FINDINGS / IMPRESSION: Intertrochanteric fracture. Mid and distal femoral shaft   intact. Osteopenia.                                       Hospital problems     Patient Active Problem List   Diagnosis Code    Essential hypertension I10    Vertigo R42    Type 2 diabetes mellitus without complication, without long-term current use of insulin ([facility]) E11.9    Bilateral hearing loss H91.93    Seasonal allergic rhinitis due to pollen J30.1    Memory difficulties R41.3    Anxiety F41.9    Intertrochanteric fracture of left femur ([facility]) S72.142A    Hypokalemia E87.6        I have reviewed, updated, and verified this note's content and spent 36 minutes of my 40 minutes visit performing counseling and coordination of care regarding medical management.        Signed By: Rio Daly MD     November 19, 2021

## 2021-11-19 NOTE — PROGRESS NOTES
ACUTE PHYSICAL THERAPY GOALS:  (Developed with and agreed upon by patient and/or caregiver.)     (1.) Ivelisse Aguirre Brace  will move from supine to sit and sit to supine  with SUPERVISION within 7 treatment day(s). (2.) Lyla Manuel will transfer from bed to chair and chair to bed with SUPERVISION using the least restrictive device within 7 treatment day(s). (3.) Lyla Manuel will ambulate with SUPERVISION for 50 feet with the least restrictive device within 7 treatment day(s). (4.) Lyla Manuel will perform standing static and dynamic balance activities x 20 minutes with SUPERVISION to improve safety within 7 treatment day(s). (5.) Lyla Manuel will perform bilateral lower extremity exercises x 20 min for HEP with SUPERVISION to improve strength, endurance, and functional mobility within 7 treatment day(s). PHYSICAL THERAPY: Daily Note and AM Treatment Day # 2    Lyla Manuel is a 80 y.o. female   PRIMARY DIAGNOSIS: Intertrochanteric fracture of left femur (HCC)  Intertrochanteric fracture of left femur ([address] [S72.142A]  Procedure(s) (LRB):  OPEN TREATMENT OF LEFT PROXIMAL FEMUR FRACTURE WITH GAMMA INTRAMEDULLARY NAIL (Left)  2 Days Post-Op    ASSESSMENT:     REHAB RECOMMENDATIONS: CURRENT LEVEL OF FUNCTION:  (Most Recently Demonstrated)   Recommendation to date pending progress:  Setting:   Short-term Rehab  Equipment:    To Be Determined Bed Mobility:   Maximal Assistance x 2  Sit to Stand:   Moderate Assistance x 2  Transfers:   Moderate Assistance x 2  Gait/Mobility:   Moderate Assistance x 2     ASSESSMENT:  Ms. Renée Link was supine in bed with cochlear implants in place. She required more a this AM to get to EOB. Bed raised and she stood. Once standing she was min to CGA but to amb she required min x 2 initially but became mod a x 2. Chair brought under her. She put forth good effort. Some progress towards goals, will continue to follow.       SUBJECTIVE:   Ms. Renée Link states \"I did it!.\"    SOCIAL HISTORY/ LIVING ENVIRONMENT: PLOF: per chat review came from [facility], was ind with   ADL's, using RW for ambulation    Support Systems: Child(justin) (daughter Kelli Becerril [phone])  OBJECTIVE:     PAIN: VITAL SIGNS: LINES/DRAINS:   Pre Treatment:    Post Treatment: 4   none  O2 Device: Nasal cannula     MOBILITY: I Mod I S SBA CGA Min Mod Max Total  NT x2 Comments:   Bed Mobility    Rolling [] [] [] [] [] [] [] [] [] [] []    Supine to Sit [] [] [] [] [] [] [] [x] [] [] [x]    Scooting [] [] [] [] [] [] [x] [] [] [] [x]    Sit to Supine [] [] [] [] [] [] [] [] [] [x] []    Transfers    Sit to Stand [] [] [] [] [] [x] [] [] [] [] [x] Bed elevated   Bed to Chair [] [] [] [] [] [] [x] [] [] [] [x]    Stand to Sit [] [] [] [] [] [] [x] [] [] [] [x]    I=Independent, Mod I=Modified Independent, S=Supervision, SBA=Standby Assistance, CGA=Contact Guard Assistance,   Min=Minimal Assistance, Mod=Moderate Assistance, Max=Maximal Assistance, Total=Total Assistance, NT=Not Tested    BALANCE: Good Fair+ Fair Fair- Poor NT Comments   Sitting Static [] [x] [] [] [] []    Sitting Dynamic [] [] [x] [] [] []              Standing Static [] [] [x] [] [] []    Standing Dynamic [] [] [] [x] [] []      GAIT: I Mod I S SBA CGA Min Mod Max Total  NT x2 Comments:   Level of Assistance [] [] [] [] [] [] [x] [] [] [] [x]    Distance 4    DME Rolling Walker    Gait Quality Forward flexed posture, antalgic gait pattern, cues for proper use of RW    Weightbearing  Status WBAT     I=Independent, Mod I=Modified Independent, S=Supervision, SBA=Standby Assistance, CGA=Contact Guard Assistance,   Min=Minimal Assistance, Mod=Moderate Assistance, Max=Maximal Assistance, Total=Total Assistance, NT=Not Tested    PLAN:   FREQUENCY/DURATION: PT Plan of Care: BID for duration of hospital stay or until stated goals are met, whichever comes first.  TREATMENT:     TREATMENT:   ($$ Gait Trainin-22 mins  $$ Therapeutic Exercises: 8-22 mins )  Therapeutic Exercise (10 Minutes): Therapeutic exercises noted below to improve functional activity tolerance, strength and mobility. Gait Training (20 Minutes): Gait training for 4 feet utilizing 5 [facility]. Patient required Manual and Verbal cueing to improve Assistive Device Utilization, Dynamic Standing Balance and Gait Mechanics.      TREATMENT GRID:  N/A    AFTER TREATMENT POSITION/PRECAUTIONS:  Chair, Needs within reach and Visitors at bedside    INTERDISCIPLINARY COLLABORATION:  RN/PCT and OT/ROY    TOTAL TREATMENT DURATION:  PT Patient Time In/Time Out  Time In: 1036  Time Out: Ahmet Sterling, PTA

## 2021-11-19 NOTE — THERAPY DISCHARGE
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 5 = High Risk)  MD Orders: Eval and Treat  MEDICAL/REFERRING DIAGNOSIS:  Other displaced fracture of upper end of left humerus, initial encounter for closed fracture [S42.292A]   DATE OF ONSET: *5 weeks post proximal L humerus fx healed non operatively  REFERRING PHYSICIAN: Celso Peacock MD  RETURN PHYSICIAN APPOINTMENT: TBD by patient      DISCHARGE: Tejas Arellano daughter called and informed us Gucci Slater has broken her hip. Will DC case at this time and plan to see her again when appropriate. PROBLEM LIST (Impacting functional limitations):  · Decreased Strength  · Decreased ADL/Functional Activities  · Increased Pain  · Decreased Activity Tolerance  · Increased Shortness of Breath  · Decreased Flexibility/Joint Mobility  · Decreased [place] with Home Exercise Program INTERVENTIONS PLANNED:  1. Cold  2. Family Education  3. Home Exercise Program (HEP)  4. Manual Therapy  5. Neuromuscular Re-education/Strengthening  6. Range of Motion (ROM)  7. Therapeutic Activites  8. Therapeutic Exercise/Strengthening  9. Transfer Training  10. Ultrasound   TREATMENT PLAN:  Effective Dates: 11/15/2021 TO 2022 (90 days).   Frequency/Duration: 2-3 times a week for 90 Days  UNKNOWN AT THIS TIME  GOALS: (Goals have been discussed and agreed upon with patient.)     Short-Term Goals~4 weeks  Goal Met   1. Aaliyahrl Darryls Brace will report <=2/10 pain with don/doffing clothing as well as minimal/no difficulty. 1.  [] Date:   2. Aaliyahrl Bills Brace will demonstrate improvement in active shoulder scaption to >100 degrees to increase UE function and participation in ADLs. 2.  [] Date:   3. Mearl Bills Brace will demonstrate demonstrate improvement in active shoulder abduction to >100 degrees to increase UE function and participation in ADLs. 3.  [] Date:   4. Mearl Bills Brace will show a greater than 8 point improvement on the ACES in order to show an increase in upper extremity function. 4.  [] Date:   5.  5.  [] Date:   6.  6.  [] Date:         Long Term Goals~8 weeks Goal Met   1. Mearl Bills Brace will show full ROM of the UE in order to return to full functional mobility  1. [] Date:   2. Mearl Bills Brace will show a greater than 10 point improvement on the ACES in order to show an increase in upper extremity function 2. [] Date:   3. Butch Desir will report doing hair/bathing without difficulty and <=2/10 pain in order to be independent with ADL's 3.  [] Date:   4.  4.  [] Date:            Outcome Measure: Tool Used: ACES Score   Score:  Initial: 40/55  Most Recent: X/55 (Date: -- )   Interpretation of Score: The DASH is designed to measure the activities of daily living in person's with upper extremity dysfunction or pain. Each section is scored on a 1-5 scale, 5 representing the greatest disability. The scores of each section are added together for a total score of 55. Rehabilitation Potential For Stated Goals: good  Regarding Glenn Andrewss Brace's therapy, I certify that the treatment plan above will be carried out by a therapist or under their direction.   Thank you for this referral,  Eliud Hartman DPT     Referring Physician Signature: Mercedes France MD

## 2021-11-20 LAB — HGB BLD-MCNC: 8.7 G/DL (ref 11.7–15.4)

## 2021-11-20 PROCEDURE — 99024 POSTOP FOLLOW-UP VISIT: CPT | Performed by: NURSE PRACTITIONER

## 2021-11-20 PROCEDURE — 97530 THERAPEUTIC ACTIVITIES: CPT

## 2021-11-20 PROCEDURE — 65270000029 HC RM PRIVATE

## 2021-11-20 PROCEDURE — 36415 COLL VENOUS BLD VENIPUNCTURE: CPT

## 2021-11-20 PROCEDURE — 85018 HEMOGLOBIN: CPT

## 2021-11-20 PROCEDURE — 74011250637 HC RX REV CODE- 250/637: Performed by: ORTHOPAEDIC SURGERY

## 2021-11-20 PROCEDURE — 74011250637 HC RX REV CODE- 250/637: Performed by: FAMILY MEDICINE

## 2021-11-20 RX ADMIN — Medication 5 ML: at 05:45

## 2021-11-20 RX ADMIN — Medication 1 TABLET: at 18:00

## 2021-11-20 RX ADMIN — MEMANTINE 5 MG: 5 TABLET ORAL at 09:52

## 2021-11-20 RX ADMIN — Medication 10 ML: at 16:01

## 2021-11-20 RX ADMIN — HYDROCODONE BITARTRATE AND ACETAMINOPHEN 1 TABLET: 7.5; 325 TABLET ORAL at 15:54

## 2021-11-20 RX ADMIN — HYDROCODONE BITARTRATE AND ACETAMINOPHEN 1 TABLET: 7.5; 325 TABLET ORAL at 09:54

## 2021-11-20 RX ADMIN — Medication 1 TABLET: at 09:52

## 2021-11-20 RX ADMIN — AMLODIPINE BESYLATE 10 MG: 10 TABLET ORAL at 09:52

## 2021-11-20 RX ADMIN — Medication 10 ML: at 21:18

## 2021-11-20 RX ADMIN — Medication 1 TABLET: at 13:04

## 2021-11-20 RX ADMIN — ACETAMINOPHEN 650 MG: 325 TABLET ORAL at 09:52

## 2021-11-20 RX ADMIN — ASPIRIN 325 MG: 325 TABLET, COATED ORAL at 09:52

## 2021-11-20 NOTE — PROGRESS NOTES
Progress Note    Patient: Carmelo Hernández MRN: [id number]  SSN: [id number]    YOB: 1935  Age: 80 y.o. Sex: female      Admit Date: 11/17/2021    LOS: 3 days     Assessment and Plan:     1. Intertrochanteric left femoral fracture, stable s/p ORIF  2. Hypokalemia, replaced  3- Hemoglobin mild drop, postop, to follow  3. HTN, controlled  4- Abnormal UA, asymptomatic    Rx:  Pain and BP control  AM lab    Dispo: rehab, likely on [place]    Subjective:   80 y.o. female with medical history of dementia, hypertension, anxiety that presented after a fall. Patient seen and examined at bedside. This morning having some hip pain. No chest pain, no SOB, no nausea or vomiting.      Today, pain control w/ meds, no cp/ sob or cough    Objective:     Vitals:    11/19/21 2315 11/20/21 0406 11/20/21 0741 11/20/21 1140   BP: (!) 149/74 (!) 157/69 (!) 147/69 (!) 126/55   Pulse: 81 94 98 67   Resp: 19 20 19 18   Temp: 98.2 °F (36.8 °C) 97.3 °F (36.3 °C) 97.3 °F (36.3 °C) 98.1 °F (36.7 °C)   SpO2: 95% 94% 95% 96%   Weight:       Height:            Intake and Output:  Current Shift: 11/20 0701 - 11/20 1900  In: 100 [P.O.:100]  Out: -   Last three shifts: 11/18 1901 - 11/20 0700  In: 100 [P.O.:100]  Out: 800 [Urine:800]    ROS  10 ROS negative except from stated on subjective    Physical Exam:   General: Alert, mild distress  Cardiovascular: RRR, S1, S2, no murmurs  Respiratory: Lungs are clear, no wheezes or rales  Abdomen: Soft, NT, ND  Back: No CVA tenderness, no paraspinal tenderness  Extremities: LE without pedal edema, no erythema  Neuro: no focal deficits  Skin: no rash or ulcers  Psych: good mood and affect    Lab/Data Review:  I have personally reviewed patients laboratory data showing  Recent Results (from the past 24 hour(s))   URINALYSIS W/ RFLX MICROSCOPIC    Collection Time: 11/19/21  2:43 PM   Result Value Ref Range    Color TARIK      Appearance CLOUDY      Specific gravity 1.023 1.001 - 1.023      pH (UA) 6.0 5.0 - 9.0      Protein 30 (A) NEG mg/dL    Glucose Negative mg/dL    Ketone Negative NEG mg/dL    Bilirubin Negative NEG      Blood Negative NEG      Urobilinogen 0.2 0.2 - 1.0 EU/dL    Nitrites Negative NEG      Leukocyte Esterase Negative NEG      WBC 3-5 0 /hpf    RBC 0-3 0 /hpf    Epithelial cells 3-5 0 /hpf    Bacteria TRACE 0 /hpf    Casts 3-5 0 /lpf    Mucus 3+ (H) 0 /lpf    Other observations RESULTS VERIFIED MANUALLY     PLEASE READ & DOCUMENT PPD TEST IN 48 HRS    Collection Time: 11/19/21  6:00 PM   Result Value Ref Range    PPD Negative Negative    mm Induration 0 0 - 5 mm   HEMOGLOBIN    Collection Time: 11/20/21  6:12 AM   Result Value Ref Range    HGB 8.7 (L) 11.7 - 15.4 g/dL      All Micro Results     Procedure Component Value Units Date/Time    COVID-19 RAPID TEST [[id number]] Collected: 11/17/21 1012    Order Status: Completed Specimen: Nasopharyngeal Updated: 11/17/21 1042     Specimen source NASAL        Comment: RAPID ONLY        COVID-19 rapid test Not detected        Comment:      The specimen is NEGATIVE for SARS-CoV-2, the novel coronavirus associated with COVID-19. A negative result does not rule out COVID-19. This test has been authorized by the FDA under an Emergency Use Authorization (EUA) for use by authorized laboratories.         Fact sheet for Healthcare Providers: ConventionUpdate.co.nz  Fact sheet for Patients: ConventionUpdate.co.nz       Methodology: Isothermal Nucleic Acid Amplification                Image:  I have personally reviewed patients imaging showing  XR HIP LT W OR WO PELV 2-3 VWS   Final Result   Post internal fixation of a left hip fracture         NC XR TECHNOLOGIST SERVICE   Final Result      A Radiologist has not reviewed images associated with this exam.      XR FEMUR LT 2 V   Final Result   4 intraoperative C-arm views during placement of dynamic left hip   screw and intramedullary adrianne are noted reducing a proximal femoral fracture. XR HIP LT W OR WO PELV 2-3 VWS   Final Result   Negative for acute change. Mild angulation proximal left humerus   fracture. PELVIS AND LEFT HIP, 3 views. INDICATION: Hip pain following fall. TECHNIQUE: AP view of the pelvis and coned down AP and frogleg lateral of the   hip. FINDINGS:    Pelvic bony ring: Appears intact. SI joints: Appear symmetric. Pubic rami: Appear intact. Lower lumbar spine: Mild DJD      Femoral head:  Has a round smooth contour. Joint space: Mildly narrowed. Femoral neck and proximal femoral shaft:  Intertrochanteric fracture with mild   varus deformity      IMPRESSION:  Intertrochanteric fracture with mild varus deformity. LEFT FEMUR 3 view(s). INDICATION: Hip pain following fall today. TECHNIQUE: AP and lateral views. COMPARISON: None. FINDINGS / IMPRESSION: Intertrochanteric fracture. Mid and distal femoral shaft   intact. Osteopenia. XR FEMUR LT 2 V   Final Result   Negative for acute change. Mild angulation proximal left humerus   fracture. PELVIS AND LEFT HIP, 3 views. INDICATION: Hip pain following fall. TECHNIQUE: AP view of the pelvis and coned down AP and frogleg lateral of the   hip. FINDINGS:    Pelvic bony ring: Appears intact. SI joints: Appear symmetric. Pubic rami: Appear intact. Lower lumbar spine: Mild DJD      Femoral head:  Has a round smooth contour. Joint space: Mildly narrowed. Femoral neck and proximal femoral shaft:  Intertrochanteric fracture with mild   varus deformity      IMPRESSION:  Intertrochanteric fracture with mild varus deformity. LEFT FEMUR 3 view(s). INDICATION: Hip pain following fall today. TECHNIQUE: AP and lateral views. COMPARISON: None. FINDINGS / IMPRESSION: Intertrochanteric fracture. Mid and distal femoral shaft   intact. Osteopenia. XR CHEST SNGL V   Final Result   Negative for acute change. Mild angulation proximal left humerus   fracture. PELVIS AND LEFT HIP, 3 views. INDICATION: Hip pain following fall. TECHNIQUE: AP view of the pelvis and coned down AP and frogleg lateral of the   hip. FINDINGS:    Pelvic bony ring: Appears intact. SI joints: Appear symmetric. Pubic rami: Appear intact. Lower lumbar spine: Mild DJD      Femoral head:  Has a round smooth contour. Joint space: Mildly narrowed. Femoral neck and proximal femoral shaft:  Intertrochanteric fracture with mild   varus deformity      IMPRESSION:  Intertrochanteric fracture with mild varus deformity. LEFT FEMUR 3 view(s). INDICATION: Hip pain following fall today. TECHNIQUE: AP and lateral views. COMPARISON: None. FINDINGS / IMPRESSION: Intertrochanteric fracture. Mid and distal femoral shaft   intact. Osteopenia. Hospital problems     Patient Active Problem List   Diagnosis Code    Essential hypertension I10    Vertigo R42    Type 2 diabetes mellitus without complication, without long-term current use of insulin ([facility]) E11.9    Bilateral hearing loss H91.93    Seasonal allergic rhinitis due to pollen J30.1    Memory difficulties R41.3    Anxiety F41.9    Intertrochanteric fracture of left femur ([facility]) S72.142A    Hypokalemia E87.6        I have reviewed, updated, and verified this note's content and spent 36 minutes of my 40 minutes visit performing counseling and coordination of care regarding medical management.        Signed By: Jania Machuca MD     November 20, 2021

## 2021-11-20 NOTE — PROGRESS NOTES
LATE ENTRY:    CM spoke with patient's daughter regarding therapy recommendations for STR at SNF. Daughter is agreeable and list of in network Medicare certified facilities reviewed. Referrals will be sent to [facility], [facility] and [facility]. Bed offer accepted and selected for [facility]. Patient will need insurance authorization prior to admission and [employer] will need most recent therapy notes. As it is a Friday, Fco Grater will need to be started Monday morning for most accurate therapy notes. Facility in agreement to do precert. CM anticipates that Fco Grater will return in less than 24 hours and will plan for patient to admit Monday afternoon or Tuesday. PPD and Covid in process. CM will continue to follow for ongoing discharge planning.

## 2021-11-20 NOTE — PROGRESS NOTES
ACUTE PHYSICAL THERAPY GOALS:  (Developed with and agreed upon by patient and/or caregiver.)     (1.) Sophie hZao  will move from supine to sit and sit to supine  with SUPERVISION within 7 treatment day(s). (2.) Sophie Rose will transfer from bed to chair and chair to bed with SUPERVISION using the least restrictive device within 7 treatment day(s). (3.) Sophie Rose will ambulate with SUPERVISION for 50 feet with the least restrictive device within 7 treatment day(s). (4.) Sophie Rose will perform standing static and dynamic balance activities x 20 minutes with SUPERVISION to improve safety within 7 treatment day(s). (5.) Sophie Rose will perform bilateral lower extremity exercises x 20 min for HEP with SUPERVISION to improve strength, endurance, and functional mobility within 7 treatment day(s). PHYSICAL THERAPY: Daily Note and PM Treatment Day # 3    Sophie Rose is a 80 y.o. female   PRIMARY DIAGNOSIS: Intertrochanteric fracture of left femur (HCC)  Intertrochanteric fracture of left femur (HCC) [S72.142A]  Procedure(s) (LRB):  OPEN TREATMENT OF LEFT PROXIMAL FEMUR FRACTURE WITH GAMMA INTRAMEDULLARY NAIL (Left)  3 Days Post-Op    ASSESSMENT:     REHAB RECOMMENDATIONS: CURRENT LEVEL OF FUNCTION:  (Most Recently Demonstrated)   Recommendation to date pending progress:  Setting:   Short-term Rehab  Equipment:    To Be Determined Bed Mobility:   Moderate Assistance  Sit to Stand:   Moderate Assistance  Transfers:   Moderate Assistance  Gait/Mobility:   Moderate Assistance     ASSESSMENT:  Ms. Brittany Tinsley was in the chair with cochlear implants in place. She did much better this PM. She needed mod a for all mobility. When allowed to move slowly she seemed to have realized she could take steps. Less assistance needed this PM.      SUBJECTIVE:   Ms. Brittany Tinsley states \"I don't know how I got here\".     SOCIAL HISTORY/ LIVING ENVIRONMENT: PLOF: per chat review came from [facility], was ind with   ADL's, using RW for ambulation    Support Systems: Child(justin) (daughter Go Arriaga [phone])  OBJECTIVE:     PAIN: VITAL SIGNS: LINES/DRAINS:   Pre Treatment:    Post Treatment: 4   none  O2 Device: Nasal cannula     MOBILITY: I Mod I S SBA CGA Min Mod Max Total  NT x2 Comments:   Bed Mobility    Rolling [] [] [] [] [] [] [] [] [] [] []    Supine to Sit [] [] [] [] [] [] [] [x] [] [] []    Scooting [] [] [] [] [] [] [] [x] [] [] []    Sit to Supine [] [] [] [] [] [] [] [] [] [] []    Transfers    Sit to Stand [] [] [] [] [] [] [] [x] [] [] [] Bed elevated   Bed to Chair [] [] [] [] [] [] [] [] [x] [] []    Stand to Sit [] [] [] [] [] [] [] [x] [] [] []    I=Independent, Mod I=Modified Independent, S=Supervision, SBA=Standby Assistance, CGA=Contact Guard Assistance,   Min=Minimal Assistance, Mod=Moderate Assistance, Max=Maximal Assistance, Total=Total Assistance, NT=Not Tested    BALANCE: Good Fair+ Fair Fair- Poor NT Comments   Sitting Static [] [x] [] [] [] []    Sitting Dynamic [] [] [x] [] [] []              Standing Static [] [] [x] [] [] []    Standing Dynamic [] [] [] [x] [] []      GAIT: I Mod I S SBA CGA Min Mod Max Total  NT x2 Comments:   Level of Assistance [] [] [] [] [] [] [x] [] [] [] []    Distance 3    DME Rolling Walker    Gait Quality     Weightbearing  Status WBAT     I=Independent, Mod I=Modified Independent, S=Supervision, SBA=Standby Assistance, CGA=Contact Guard Assistance,   Min=Minimal Assistance, Mod=Moderate Assistance, Max=Maximal Assistance, Total=Total Assistance, NT=Not Tested    PLAN:   FREQUENCY/DURATION: PT Plan of Care: BID for duration of hospital stay or until stated goals are met, whichever comes first.  TREATMENT:     TREATMENT:   ($$ Therapeutic Activity: 38-52 mins    )  Therapeutic Activity (39 Minutes):  Therapeutic activity included Sit to Supine, Scooting, Transfer Training, Sitting balance  and Standing balance to improve functional Mobility, Strength and Activity tolerance.     TREATMENT GRID:  N/A    AFTER TREATMENT POSITION/PRECAUTIONS:  Chair, Needs within reach and Visitors at bedside    INTERDISCIPLINARY COLLABORATION:  RN/PCT and PT/PTA    TOTAL TREATMENT DURATION:  PT Patient Time In/Time Out  Time In: 1325  Time Out: [facility]

## 2021-11-20 NOTE — PROGRESS NOTES
ACUTE PHYSICAL THERAPY GOALS:  (Developed with and agreed upon by patient and/or caregiver.)     (1.) Reji Navarro Brace  will move from supine to sit and sit to supine  with SUPERVISION within 7 treatment day(s). (2.) Liza Orlando will transfer from bed to chair and chair to bed with SUPERVISION using the least restrictive device within 7 treatment day(s). (3.) Liza Orlando will ambulate with SUPERVISION for 50 feet with the least restrictive device within 7 treatment day(s). (4.) Liza Orlando will perform standing static and dynamic balance activities x 20 minutes with SUPERVISION to improve safety within 7 treatment day(s). (5.) Liza Orlando will perform bilateral lower extremity exercises x 20 min for HEP with SUPERVISION to improve strength, endurance, and functional mobility within 7 treatment day(s). PHYSICAL THERAPY: Daily Note and AM Treatment Day # 3    Liza Orlando is a 80 y.o. female   PRIMARY DIAGNOSIS: Intertrochanteric fracture of left femur (HCC)  Intertrochanteric fracture of left femur (HCC) [S72.142A]  Procedure(s) (LRB):  OPEN TREATMENT OF LEFT PROXIMAL FEMUR FRACTURE WITH GAMMA INTRAMEDULLARY NAIL (Left)  3 Days Post-Op    ASSESSMENT:     REHAB RECOMMENDATIONS: CURRENT LEVEL OF FUNCTION:  (Most Recently Demonstrated)   Recommendation to date pending progress:  Setting:   Short-term Rehab  Equipment:    To Be Determined Bed Mobility:   Maximal Assistance  Sit to Stand:   Maximal Assistance  Transfers:   Maximal Assistance to total  Gait/Mobility:   Not tested     ASSESSMENT:  Ms. Charmayne Sicks was supine in bed with cochlear implants in place. She required more a this AM to get to EOB. She attempted several times with less assistance but would scream and sit back down. Bed elevated. Total a needed to transfer pt to chair. SUBJECTIVE:   Ms. Charmayne Sicks states \"I don't remember getting up yesterday\".     SOCIAL HISTORY/ LIVING ENVIRONMENT: PLOF: per chat review came from [facility], was ind with ADL's, using RW for ambulation    Support Systems: Child(justin) (daughter Carmelita Tucker [phone])  OBJECTIVE:     PAIN: VITAL SIGNS: LINES/DRAINS:   Pre Treatment:    Post Treatment: 4   none  O2 Device: Nasal cannula     MOBILITY: I Mod I S SBA CGA Min Mod Max Total  NT x2 Comments:   Bed Mobility    Rolling [] [] [] [] [] [] [] [] [] [] []    Supine to Sit [] [] [] [] [] [] [] [x] [] [] []    Scooting [] [] [] [] [] [] [] [x] [] [] []    Sit to Supine [] [] [] [] [] [] [] [] [] [] []    Transfers    Sit to Stand [] [] [] [] [] [] [] [x] [] [] [] Bed elevated   Bed to Chair [] [] [] [] [] [] [] [] [x] [] []    Stand to Sit [] [] [] [] [] [] [] [x] [] [] []    I=Independent, Mod I=Modified Independent, S=Supervision, SBA=Standby Assistance, CGA=Contact Guard Assistance,   Min=Minimal Assistance, Mod=Moderate Assistance, Max=Maximal Assistance, Total=Total Assistance, NT=Not Tested    BALANCE: Good Fair+ Fair Fair- Poor NT Comments   Sitting Static [] [x] [] [] [] []    Sitting Dynamic [] [] [x] [] [] []              Standing Static [] [] [x] [] [] []    Standing Dynamic [] [] [] [x] [] []      GAIT: I Mod I S SBA CGA Min Mod Max Total  NT x2 Comments:   Level of Assistance [] [] [] [] [] [] [x] [] [] [] [x]    Distance     DME Rolling Walker    Gait Quality     Weightbearing  Status WBAT     I=Independent, Mod I=Modified Independent, S=Supervision, SBA=Standby Assistance, CGA=Contact Guard Assistance,   Min=Minimal Assistance, Mod=Moderate Assistance, Max=Maximal Assistance, Total=Total Assistance, NT=Not Tested    PLAN:   FREQUENCY/DURATION: PT Plan of Care: BID for duration of hospital stay or until stated goals are met, whichever comes first.  TREATMENT:     TREATMENT:   ($$ Therapeutic Activity: 38-52 mins    )  Therapeutic Activity (40 Minutes):  Therapeutic activity included Supine to Sit, Scooting, Transfer Training, Sitting balance  and Standing balance to improve functional Mobility, Strength and Activity tolerance.     TREATMENT GRID:  N/A    AFTER TREATMENT POSITION/PRECAUTIONS:  Chair, Needs within reach and Visitors at bedside    INTERDISCIPLINARY COLLABORATION:  RN/PCT and PT/PTA    TOTAL TREATMENT DURATION:  PT Patient Time In/Time Out  Time In: 1020  Time Out: [id number] [address], PTA

## 2021-11-20 NOTE — PROGRESS NOTES
2021         Post Op day: 3 Days Post-Op     Admit Date: 2021  Admit Diagnosis: Intertrochanteric fracture of left femur ([facility] [address] [S72.142A]        Subjective: Patient is status-post Procedure(s) (LRB):  OPEN TREATMENT OF LEFT PROXIMAL FEMUR FRACTURE WITH GAMMA INTRAMEDULLARY NAIL (Left). Patient doing well. No concerns/complaints. No shortness of breath, chest pain or nausea/vomiting. Objective:   Visit Vitals  BP (!) 157/69 (BP 1 Location: Left upper arm, BP Patient Position: At rest)   Pulse 94   Temp 97.3 °F (36.3 °C)   Resp 20   Ht 5' 5\" (1.651 m)   Wt 140 lb (63.5 kg)   SpO2 94%   BMI 23.30 kg/m²    Temp (24hrs), Av.3 °F (36.8 °C), Min:97.3 °F (36.3 °C), Max:98.8 °F (37.1 °C)    Lab Results   Component Value Date/Time    HGB 8.7 (L) 2021 06:12 AM     Extremity Exam  Dressing Clean, dry, intact.   Neuro intact and unchanged left extremity  Sensation intact to light touch  Extremity perfused  No sign of DVT     Assessment / Plan :  S/p Procedure(s) (LRB):  OPEN TREATMENT OF LEFT PROXIMAL FEMUR FRACTURE WITH GAMMA INTRAMEDULLARY NAIL (Left)  Continue current postoperative plan  PT/OT-Continue current weightbearing status and restrictions of involved extremities  Continue current VTE prophylaxis  DIspo-Rehab  Patient Active Problem List   Diagnosis Code    Essential hypertension I10    Vertigo R42    Type 2 diabetes mellitus without complication, without long-term current use of insulin ([facility]) E11.9    Bilateral hearing loss H91.93    Seasonal allergic rhinitis due to pollen J30.1    Memory difficulties R41.3    Anxiety F41.9    Intertrochanteric fracture of left femur ([facility]) S72.142A    Hypokalemia E87.6          Signed By: Christina Lagos NP

## 2021-11-21 LAB — HGB BLD-MCNC: 7.5 G/DL (ref 11.7–15.4)

## 2021-11-21 PROCEDURE — 74011250637 HC RX REV CODE- 250/637: Performed by: FAMILY MEDICINE

## 2021-11-21 PROCEDURE — 94760 N-INVAS EAR/PLS OXIMETRY 1: CPT

## 2021-11-21 PROCEDURE — 36415 COLL VENOUS BLD VENIPUNCTURE: CPT

## 2021-11-21 PROCEDURE — 85018 HEMOGLOBIN: CPT

## 2021-11-21 PROCEDURE — 65270000029 HC RM PRIVATE

## 2021-11-21 PROCEDURE — 97530 THERAPEUTIC ACTIVITIES: CPT

## 2021-11-21 PROCEDURE — 77010033678 HC OXYGEN DAILY

## 2021-11-21 PROCEDURE — 74011250637 HC RX REV CODE- 250/637: Performed by: ORTHOPAEDIC SURGERY

## 2021-11-21 RX ADMIN — Medication 1 TABLET: at 09:25

## 2021-11-21 RX ADMIN — Medication 10 ML: at 21:02

## 2021-11-21 RX ADMIN — Medication 10 ML: at 15:42

## 2021-11-21 RX ADMIN — Medication 10 ML: at 05:33

## 2021-11-21 RX ADMIN — Medication 1 TABLET: at 16:56

## 2021-11-21 RX ADMIN — ACETAMINOPHEN 650 MG: 325 TABLET ORAL at 09:25

## 2021-11-21 RX ADMIN — AMLODIPINE BESYLATE 10 MG: 10 TABLET ORAL at 09:24

## 2021-11-21 RX ADMIN — Medication 1 TABLET: at 13:34

## 2021-11-21 RX ADMIN — ASPIRIN 325 MG: 325 TABLET, COATED ORAL at 09:25

## 2021-11-21 RX ADMIN — MEMANTINE 5 MG: 5 TABLET ORAL at 09:25

## 2021-11-21 NOTE — PROGRESS NOTES
ACUTE PHYSICAL THERAPY GOALS:  (Developed with and agreed upon by patient and/or caregiver.)     (1.) Dee Dee Shoe Brace  will move from supine to sit and sit to supine  with SUPERVISION within 7 treatment day(s). (2.) Chidi Perales will transfer from bed to chair and chair to bed with SUPERVISION using the least restrictive device within 7 treatment day(s). (3.) Chidi Perales will ambulate with SUPERVISION for 50 feet with the least restrictive device within 7 treatment day(s). (4.) Chidi Perales will perform standing static and dynamic balance activities x 20 minutes with SUPERVISION to improve safety within 7 treatment day(s). (5.) Chidi Perales will perform bilateral lower extremity exercises x 20 min for HEP with SUPERVISION to improve strength, endurance, and functional mobility within 7 treatment day(s). PHYSICAL THERAPY: Daily Note and PM Treatment Day # 4    Chidi Perales is a 80 y.o. female   PRIMARY DIAGNOSIS: Intertrochanteric fracture of left femur ([facility])  Intertrochanteric fracture of left femur ([facility] [address] [S72.142A]  Procedure(s) (LRB):  OPEN TREATMENT OF LEFT PROXIMAL FEMUR FRACTURE WITH GAMMA INTRAMEDULLARY NAIL (Left)  4 Days Post-Op    ASSESSMENT:     REHAB RECOMMENDATIONS: CURRENT LEVEL OF FUNCTION:  (Most Recently Demonstrated)   Recommendation to date pending progress:  Setting:   Short-term Rehab  Equipment:    To Be Determined Bed Mobility:   Minimal Assistance  Sit to Stand:   Minimal Assistance  Transfers:   Minimal Assistance  Gait/Mobility:   Not tested     ASSESSMENT:  Ms. Joi Oconnor was supine in bed with cochlear implants in place. She required less assistance. Requires additional time. Min a for all transfers. She took a few steps to the [facility] then a few more to the EOB. She is slow and additional time is needed but she is willing to participate. SUBJECTIVE:   Ms. Joi Oconnor states \"What do you want me to do now? \".     SOCIAL HISTORY/ LIVING ENVIRONMENT: PLOF: per chat review came from [facility], was ind with   ADL's, using RW for ambulation    Support Systems: Child(justin) (daughter Stefania Tavarez [phone])  OBJECTIVE:     PAIN: VITAL SIGNS: LINES/DRAINS:   Pre Treatment:    Post Treatment: 4   none  O2 Device: Hi flow nasal cannula     MOBILITY: I Mod I S SBA CGA Min Mod Max Total  NT x2 Comments:   Bed Mobility    Rolling [] [] [] [] [] [] [] [] [] [] []    Supine to Sit [] [] [] [] [] [x] [] [] [] [] []    Scooting [] [] [] [] [] [x] [] [] [] [] []    Sit to Supine [] [] [] [] [] [] [] [] [] [] []    Transfers    Sit to Stand [] [] [] [] [] [x] [] [] [] [] [] Bed elevated   Bed to Chair [] [] [] [] [] [x] [] [] [] [] []    Stand to Sit [] [] [] [] [] [x] [] [] [] [] []    I=Independent, Mod I=Modified Independent, S=Supervision, SBA=Standby Assistance, CGA=Contact Guard Assistance,   Min=Minimal Assistance, Mod=Moderate Assistance, Max=Maximal Assistance, Total=Total Assistance, NT=Not Tested    BALANCE: Good Fair+ Fair Fair- Poor NT Comments   Sitting Static [] [x] [] [] [] []    Sitting Dynamic [] [] [x] [] [] []              Standing Static [] [] [x] [] [] []    Standing Dynamic [] [] [] [x] [] []      GAIT: I Mod I S SBA CGA Min Mod Max Total  NT x2 Comments:   Level of Assistance [] [] [] [] [] [x] [] [] [] [] []    Distance 2'    DME Rolling Walker    Gait Quality Flexed posture, narrow NATALIA    Weightbearing  Status WBAT     I=Independent, Mod I=Modified Independent, S=Supervision, SBA=Standby Assistance, CGA=Contact Guard Assistance,   Min=Minimal Assistance, Mod=Moderate Assistance, Max=Maximal Assistance, Total=Total Assistance, NT=Not Tested    PLAN:   FREQUENCY/DURATION: PT Plan of Care: BID for duration of hospital stay or until stated goals are met, whichever comes first.  TREATMENT:     TREATMENT:   ($$ Therapeutic Activity: 23-37 mins    )  Therapeutic Activity (24 Minutes):  Therapeutic activity included Supine to Sit, Scooting, Transfer Training, Sitting balance  and Standing balance to improve functional Mobility, Strength and Activity tolerance.     TREATMENT GRID:  N/A    AFTER TREATMENT POSITION/PRECAUTIONS:  Alarm Activated, Chair and Needs within reach    INTERDISCIPLINARY COLLABORATION:  RN/PCT and PT/PTA    TOTAL TREATMENT DURATION:  PT Patient Time In/Time Out  Time In: 1400  Time Out: [address], PTA

## 2021-11-21 NOTE — PROGRESS NOTES
Progress Note    Patient: Fernando Barrios MRN: [id number]  SSN: [id number]    YOB: 1935  Age: 80 y.o. Sex: female      Admit Date: 11/17/2021    LOS: 4 days     Assessment and Plan:     1. Intertrochanteric left femoral fracture, stable s/p ORIF  2. Hypokalemia, replaced  3- Hemoglobin mild drop, postop, to follow  3. HTN, controlled  4- Abnormal UA, asymptomatic    Rx:  Pain and BP control  AM lab to follow    Dispo: rehab when available    Subjective:   80 y.o. female with medical history of dementia, hypertension, anxiety that presented after a fall. Patient seen and examined at bedside. This morning having some hip pain. No chest pain, no SOB, no nausea or vomiting. Today, pain control w/ meds, no cp/ sob or cough, no ac events    Objective:     Vitals:    11/20/21 2018 11/21/21 0016 11/21/21 0429 11/21/21 0732   BP: 138/65 (!) 145/61 (!) 143/76 133/69   Pulse: 95 88 90 86   Resp: 16 16 18 18   Temp: 98.6 °F (37 °C) 99.3 °F (37.4 °C) 99.2 °F (37.3 °C) 99.4 °F (37.4 °C)   SpO2: 91% 94% 94% 93%   Weight:       Height:            Intake and Output:  Current Shift: No intake/output data recorded.   Last three shifts: 11/19 1901 - 11/21 0700  In: 100 [P.O.:100]  Out: 100 [Urine:100]    ROS  10 ROS negative except from stated on subjective    Physical Exam:   General: Alert, mild distress  Cardiovascular: RRR, S1, S2, no murmurs  Respiratory: Lungs are clear, no wheezes or rales  Abdomen: Soft, NT, ND  Back: No CVA tenderness, no paraspinal tenderness  Extremities: LE without pedal edema, no erythema  Neuro: no focal deficits  Skin: no rash or ulcers  Psych: good mood and affect    Lab/Data Review:  I have personally reviewed patients laboratory data showing  Recent Results (from the past 24 hour(s))   HEMOGLOBIN    Collection Time: 11/21/21  6:31 AM   Result Value Ref Range    HGB 7.5 (L) 11.7 - 15.4 g/dL      All Micro Results     Procedure Component Value Units Date/Time    COVID-19 RAPID TEST [[id number]] Collected: 11/17/21 1012    Order Status: Completed Specimen: Nasopharyngeal Updated: 11/17/21 1042     Specimen source NASAL        Comment: RAPID ONLY        COVID-19 rapid test Not detected        Comment:      The specimen is NEGATIVE for SARS-CoV-2, the novel coronavirus associated with COVID-19. A negative result does not rule out COVID-19. This test has been authorized by the FDA under an Emergency Use Authorization (EUA) for use by authorized laboratories. Fact sheet for Healthcare Providers: [employer]  Fact sheet for Patients: [employer]       Methodology: Isothermal Nucleic Acid Amplification                Image:  I have personally reviewed patients imaging showing  XR HIP LT W OR WO PELV 2-3 VWS   Final Result   Post internal fixation of a left hip fracture         NC XR TECHNOLOGIST SERVICE   Final Result      A Radiologist has not reviewed images associated with this exam.      XR FEMUR LT 2 V   Final Result   4 intraoperative C-arm views during placement of dynamic left hip   screw and intramedullary adrianne are noted reducing a proximal femoral fracture. XR HIP LT W OR WO PELV 2-3 VWS   Final Result   Negative for acute change. Mild angulation proximal left humerus   fracture. PELVIS AND LEFT HIP, 3 views. INDICATION: Hip pain following fall. TECHNIQUE: AP view of the pelvis and coned down AP and frogleg lateral of the   hip. FINDINGS:    Pelvic bony ring: Appears intact. SI joints: Appear symmetric. Pubic rami: Appear intact. Lower lumbar spine: Mild DJD      Femoral head:  Has a round smooth contour. Joint space: Mildly narrowed. Femoral neck and proximal femoral shaft:  Intertrochanteric fracture with mild   varus deformity      IMPRESSION:  Intertrochanteric fracture with mild varus deformity. LEFT FEMUR 3 view(s).       INDICATION: Hip pain following fall today.       TECHNIQUE: AP and lateral views. COMPARISON: None. FINDINGS / IMPRESSION: Intertrochanteric fracture. Mid and distal femoral shaft   intact. Osteopenia. XR FEMUR LT 2 V   Final Result   Negative for acute change. Mild angulation proximal left humerus   fracture. PELVIS AND LEFT HIP, 3 views. INDICATION: Hip pain following fall. TECHNIQUE: AP view of the pelvis and coned down AP and frogleg lateral of the   hip. FINDINGS:    Pelvic bony ring: Appears intact. SI joints: Appear symmetric. Pubic rami: Appear intact. Lower lumbar spine: Mild DJD      Femoral head:  Has a round smooth contour. Joint space: Mildly narrowed. Femoral neck and proximal femoral shaft:  Intertrochanteric fracture with mild   varus deformity      IMPRESSION:  Intertrochanteric fracture with mild varus deformity. LEFT FEMUR 3 view(s). INDICATION: Hip pain following fall today. TECHNIQUE: AP and lateral views. COMPARISON: None. FINDINGS / IMPRESSION: Intertrochanteric fracture. Mid and distal femoral shaft   intact. Osteopenia. XR CHEST SNGL V   Final Result   Negative for acute change. Mild angulation proximal left humerus   fracture. PELVIS AND LEFT HIP, 3 views. INDICATION: Hip pain following fall. TECHNIQUE: AP view of the pelvis and coned down AP and frogleg lateral of the   hip. FINDINGS:    Pelvic bony ring: Appears intact. SI joints: Appear symmetric. Pubic rami: Appear intact. Lower lumbar spine: Mild DJD      Femoral head:  Has a round smooth contour. Joint space: Mildly narrowed. Femoral neck and proximal femoral shaft:  Intertrochanteric fracture with mild   varus deformity      IMPRESSION:  Intertrochanteric fracture with mild varus deformity. LEFT FEMUR 3 view(s). INDICATION: Hip pain following fall today.        TECHNIQUE: AP and lateral views. COMPARISON: None. FINDINGS / IMPRESSION: Intertrochanteric fracture. Mid and distal femoral shaft   intact. Osteopenia. Hospital problems     Patient Active Problem List   Diagnosis Code    Essential hypertension I10    Vertigo R42    Type 2 diabetes mellitus without complication, without long-term current use of insulin ([facility]) E11.9    Bilateral hearing loss H91.93    Seasonal allergic rhinitis due to pollen J30.1    Memory difficulties R41.3    Anxiety F41.9    Intertrochanteric fracture of left femur ([facility]) S72.142A    Hypokalemia E87.6        I have reviewed, updated, and verified this note's content and spent 36 minutes of my 40 minutes visit performing counseling and coordination of care regarding medical management.        Signed By: Dulce Goldstein MD     November 21, 2021

## 2021-11-21 NOTE — PROGRESS NOTES
ACUTE PHYSICAL THERAPY GOALS:  (Developed with and agreed upon by patient and/or caregiver.)     (1.) Alba Zhao  will move from supine to sit and sit to supine  with SUPERVISION within 7 treatment day(s). (2.) Thee Perez will transfer from bed to chair and chair to bed with SUPERVISION using the least restrictive device within 7 treatment day(s). (3.) Maganline Chris will ambulate with SUPERVISION for 50 feet with the least restrictive device within 7 treatment day(s). (4.) Thee Perez will perform standing static and dynamic balance activities x 20 minutes with SUPERVISION to improve safety within 7 treatment day(s). (5.) Thee Perez will perform bilateral lower extremity exercises x 20 min for HEP with SUPERVISION to improve strength, endurance, and functional mobility within 7 treatment day(s). PHYSICAL THERAPY: Daily Note and AM Treatment Day # 4    Thee Perez is a 80 y.o. female   PRIMARY DIAGNOSIS: Intertrochanteric fracture of left femur (HCC)  Intertrochanteric fracture of left femur ([address] [S72.142A]  Procedure(s) (LRB):  OPEN TREATMENT OF LEFT PROXIMAL FEMUR FRACTURE WITH GAMMA INTRAMEDULLARY NAIL (Left)  4 Days Post-Op    ASSESSMENT:     REHAB RECOMMENDATIONS: CURRENT LEVEL OF FUNCTION:  (Most Recently Demonstrated)   Recommendation to date pending progress:  Setting:   Short-term Rehab  Equipment:    To Be Determined Bed Mobility:   Minimal Assistance  Sit to Stand:   Minimal Assistance  Transfers:   Minimal Assistance  Gait/Mobility:   Not tested     ASSESSMENT:  Ms. Souleymane Saldivar was supine in bed with cochlear implants in place. She required less assistance  this AM. Min a for all transfers. She is slow and additional time is needed but she is willing to participate. SUBJECTIVE:   Ms. Souleymane Saldivar states \"I don't remember getting up yesterday\".     SOCIAL HISTORY/ LIVING ENVIRONMENT: PLOF: per chat review came from [facility], was ind with   ADL's, using RW for ambulation    Support Systems: Child(justin) (daughter Monalisa Redd [phone])  OBJECTIVE:     PAIN: VITAL SIGNS: LINES/DRAINS:   Pre Treatment:    Post Treatment: 4   none  O2 Device: Nasal cannula     MOBILITY: I Mod I S SBA CGA Min Mod Max Total  NT x2 Comments:   Bed Mobility    Rolling [] [] [] [] [] [] [] [] [] [] []    Supine to Sit [] [] [] [] [] [x] [] [] [] [] []    Scooting [] [] [] [] [] [x] [] [] [] [] []    Sit to Supine [] [] [] [] [] [] [] [] [] [] []    Transfers    Sit to Stand [] [] [] [] [] [x] [] [] [] [] [] Bed elevated   Bed to Chair [] [] [] [] [] [x] [] [] [] [] []    Stand to Sit [] [] [] [] [] [x] [] [] [] [] []    I=Independent, Mod I=Modified Independent, S=Supervision, SBA=Standby Assistance, CGA=Contact Guard Assistance,   Min=Minimal Assistance, Mod=Moderate Assistance, Max=Maximal Assistance, Total=Total Assistance, NT=Not Tested    BALANCE: Good Fair+ Fair Fair- Poor NT Comments   Sitting Static [] [x] [] [] [] []    Sitting Dynamic [] [] [x] [] [] []              Standing Static [] [] [x] [] [] []    Standing Dynamic [] [] [] [x] [] []      GAIT: I Mod I S SBA CGA Min Mod Max Total  NT x2 Comments:   Level of Assistance [] [] [] [] [] [x] [] [] [] [] []    Distance     DME Rolling Walker    Gait Quality     Weightbearing  Status WBAT     I=Independent, Mod I=Modified Independent, S=Supervision, SBA=Standby Assistance, CGA=Contact Guard Assistance,   Min=Minimal Assistance, Mod=Moderate Assistance, Max=Maximal Assistance, Total=Total Assistance, NT=Not Tested    PLAN:   FREQUENCY/DURATION: PT Plan of Care: BID for duration of hospital stay or until stated goals are met, whichever comes first.  TREATMENT:     TREATMENT:   ($$ Therapeutic Activity: 23-37 mins    )  Therapeutic Activity (23 Minutes): Therapeutic activity included Supine to Sit, Scooting, Transfer Training, Sitting balance  and Standing balance to improve functional Mobility, Strength and Activity tolerance.     TREATMENT GRID:  N/A    AFTER TREATMENT POSITION/PRECAUTIONS:  Alarm Activated, Chair and Needs within reach    INTERDISCIPLINARY COLLABORATION:  RN/PCT and PT/PTA    TOTAL TREATMENT DURATION:  PT Patient Time In/Time Out  Time In: 1113  Time Out: Perez Sterling PTA

## 2021-11-22 ENCOUNTER — APPOINTMENT (OUTPATIENT)
Dept: PHYSICAL THERAPY | Age: 86
End: 2021-11-22
Attending: ORTHOPAEDIC SURGERY
Payer: MEDICARE

## 2021-11-22 LAB — HGB BLD-MCNC: 9 G/DL (ref 11.7–15.4)

## 2021-11-22 PROCEDURE — 65270000029 HC RM PRIVATE

## 2021-11-22 PROCEDURE — 97530 THERAPEUTIC ACTIVITIES: CPT

## 2021-11-22 PROCEDURE — 74011250637 HC RX REV CODE- 250/637: Performed by: ORTHOPAEDIC SURGERY

## 2021-11-22 PROCEDURE — 85018 HEMOGLOBIN: CPT

## 2021-11-22 PROCEDURE — 77030027138 HC INCENT SPIROMETER -A

## 2021-11-22 PROCEDURE — 74011250637 HC RX REV CODE- 250/637: Performed by: FAMILY MEDICINE

## 2021-11-22 PROCEDURE — 97110 THERAPEUTIC EXERCISES: CPT

## 2021-11-22 PROCEDURE — 97112 NEUROMUSCULAR REEDUCATION: CPT

## 2021-11-22 PROCEDURE — 36415 COLL VENOUS BLD VENIPUNCTURE: CPT

## 2021-11-22 RX ADMIN — Medication 10 ML: at 05:53

## 2021-11-22 RX ADMIN — Medication 1 TABLET: at 08:47

## 2021-11-22 RX ADMIN — Medication 1 TABLET: at 16:43

## 2021-11-22 RX ADMIN — Medication 10 ML: at 16:42

## 2021-11-22 RX ADMIN — ASPIRIN 325 MG: 325 TABLET, COATED ORAL at 08:47

## 2021-11-22 RX ADMIN — POLYETHYLENE GLYCOL 3350 17 G: 17 POWDER, FOR SOLUTION ORAL at 08:48

## 2021-11-22 RX ADMIN — Medication 10 ML: at 21:09

## 2021-11-22 RX ADMIN — ACETAMINOPHEN 650 MG: 325 TABLET ORAL at 08:47

## 2021-11-22 RX ADMIN — MEMANTINE 5 MG: 5 TABLET ORAL at 08:48

## 2021-11-22 RX ADMIN — HYDROCODONE BITARTRATE AND ACETAMINOPHEN 1 TABLET: 7.5; 325 TABLET ORAL at 00:14

## 2021-11-22 RX ADMIN — Medication 1 TABLET: at 12:23

## 2021-11-22 RX ADMIN — AMLODIPINE BESYLATE 10 MG: 10 TABLET ORAL at 08:47

## 2021-11-22 NOTE — PROGRESS NOTES
Progress Note    Patient: Sondra Askew MRN: [id number]  SSN: [id number]    YOB: 1935  Age: 80 y.o. Sex: female      Admit Date: 11/17/2021    LOS: 5 days     Assessment and Plan:     1. Intertrochanteric left femoral fracture, stable s/p ORIF  2. Hypokalemia, replaced  3- Hemoglobin mild drop, postop, to follow- stabilized  3. HTN, controlled  4- Abnormal UA, asymptomatic    Rx:  Pain and BP control  AM lab    Dispo: rehab when available    Subjective:   80 y.o. female with medical history of dementia, hypertension, anxiety that presented after a fall. Patient seen and examined at bedside. This morning having some hip pain. No chest pain, no SOB, no nausea or vomiting. Today, pain control w/ meds, no cp/ sob or cough, no change    Objective:     Vitals:    11/21/21 1954 11/21/21 2340 11/22/21 0530 11/22/21 0727   BP: (!) 148/64 (!) 163/78 (!) 120/59 (!) 149/67   Pulse: 91 (!) 102 97 89   Resp: 18 18 18 16   Temp: 98.3 °F (36.8 °C) 98.9 °F (37.2 °C) 98.6 °F (37 °C) 98.5 °F (36.9 °C)   SpO2: 91% 95% 92% 96%   Weight:       Height:            Intake and Output:  Current Shift: No intake/output data recorded.   Last three shifts: 11/20 1901 - 11/22 0700  In: -   Out: 275 [Urine:275]    ROS  10 ROS negative except from stated on subjective    Physical Exam:   General: Alert, mild distress  Cardiovascular: RRR, S1, S2, no murmurs  Respiratory: Lungs are clear, no wheezes or rales  Abdomen: Soft, NT, ND  Back: No CVA tenderness, no paraspinal tenderness  Extremities: LE without pedal edema, no erythema  Neuro: no focal deficits  Skin: no rash or ulcers  Psych: good mood and affect    Lab/Data Review:  I have personally reviewed patients laboratory data showing  Recent Results (from the past 24 hour(s))   HEMOGLOBIN    Collection Time: 11/22/21  5:11 AM   Result Value Ref Range    HGB 9.0 (L) 11.7 - 15.4 g/dL      All Micro Results     Procedure Component Value Units Date/Time    COVID-19 RAPID TEST [[id number]] Collected: 11/17/21 1012    Order Status: Completed Specimen: Nasopharyngeal Updated: 11/17/21 1042     Specimen source NASAL        Comment: RAPID ONLY        COVID-19 rapid test Not detected        Comment:      The specimen is NEGATIVE for SARS-CoV-2, the novel coronavirus associated with COVID-19. A negative result does not rule out COVID-19. This test has been authorized by the FDA under an Emergency Use Authorization (EUA) for use by authorized laboratories. Fact sheet for Healthcare Providers: [employer]  Fact sheet for Patients: [employer]       Methodology: Isothermal Nucleic Acid Amplification                Image:  I have personally reviewed patients imaging showing  XR HIP LT W OR WO PELV 2-3 VWS   Final Result   Post internal fixation of a left hip fracture         NC XR TECHNOLOGIST SERVICE   Final Result      A Radiologist has not reviewed images associated with this exam.      XR FEMUR LT 2 V   Final Result   4 intraoperative C-arm views during placement of dynamic left hip   screw and intramedullary adrianne are noted reducing a proximal femoral fracture. XR HIP LT W OR WO PELV 2-3 VWS   Final Result   Negative for acute change. Mild angulation proximal left humerus   fracture. PELVIS AND LEFT HIP, 3 views. INDICATION: Hip pain following fall. TECHNIQUE: AP view of the pelvis and coned down AP and frogleg lateral of the   hip. FINDINGS:    Pelvic bony ring: Appears intact. SI joints: Appear symmetric. Pubic rami: Appear intact. Lower lumbar spine: Mild DJD      Femoral head:  Has a round smooth contour. Joint space: Mildly narrowed. Femoral neck and proximal femoral shaft:  Intertrochanteric fracture with mild   varus deformity      IMPRESSION:  Intertrochanteric fracture with mild varus deformity. LEFT FEMUR 3 view(s).       INDICATION: Hip pain following fall today.       TECHNIQUE: AP and lateral views. COMPARISON: None. FINDINGS / IMPRESSION: Intertrochanteric fracture. Mid and distal femoral shaft   intact. Osteopenia. XR FEMUR LT 2 V   Final Result   Negative for acute change. Mild angulation proximal left humerus   fracture. PELVIS AND LEFT HIP, 3 views. INDICATION: Hip pain following fall. TECHNIQUE: AP view of the pelvis and coned down AP and frogleg lateral of the   hip. FINDINGS:    Pelvic bony ring: Appears intact. SI joints: Appear symmetric. Pubic rami: Appear intact. Lower lumbar spine: Mild DJD      Femoral head:  Has a round smooth contour. Joint space: Mildly narrowed. Femoral neck and proximal femoral shaft:  Intertrochanteric fracture with mild   varus deformity      IMPRESSION:  Intertrochanteric fracture with mild varus deformity. LEFT FEMUR 3 view(s). INDICATION: Hip pain following fall today. TECHNIQUE: AP and lateral views. COMPARISON: None. FINDINGS / IMPRESSION: Intertrochanteric fracture. Mid and distal femoral shaft   intact. Osteopenia. XR CHEST SNGL V   Final Result   Negative for acute change. Mild angulation proximal left humerus   fracture. PELVIS AND LEFT HIP, 3 views. INDICATION: Hip pain following fall. TECHNIQUE: AP view of the pelvis and coned down AP and frogleg lateral of the   hip. FINDINGS:    Pelvic bony ring: Appears intact. SI joints: Appear symmetric. Pubic rami: Appear intact. Lower lumbar spine: Mild DJD      Femoral head:  Has a round smooth contour. Joint space: Mildly narrowed. Femoral neck and proximal femoral shaft:  Intertrochanteric fracture with mild   varus deformity      IMPRESSION:  Intertrochanteric fracture with mild varus deformity. LEFT FEMUR 3 view(s). INDICATION: Hip pain following fall today.        TECHNIQUE: AP and lateral views. COMPARISON: None. FINDINGS / IMPRESSION: Intertrochanteric fracture. Mid and distal femoral shaft   intact. Osteopenia. Hospital problems     Patient Active Problem List   Diagnosis Code    Essential hypertension I10    Vertigo R42    Type 2 diabetes mellitus without complication, without long-term current use of insulin ([facility]) E11.9    Bilateral hearing loss H91.93    Seasonal allergic rhinitis due to pollen J30.1    Memory difficulties R41.3    Anxiety F41.9    Intertrochanteric fracture of left femur ([facility]) S72.142A    Hypokalemia E87.6        I have reviewed, updated, and verified this note's content and spent 36 minutes of my 40 minutes visit performing counseling and coordination of care regarding medical management.        Signed By: Dulce Goldstein MD     November 22, 2021

## 2021-11-22 NOTE — PROGRESS NOTES
ACUTE PHYSICAL THERAPY GOALS:  (Developed with and agreed upon by patient and/or caregiver.)     (1.) Minh Gonzalez Brace  will move from supine to sit and sit to supine  with SUPERVISION within 7 treatment day(s). (2.) Carmelo Hernández will transfer from bed to chair and chair to bed with SUPERVISION using the least restrictive device within 7 treatment day(s). (3.) Carmelo Hernández will ambulate with SUPERVISION for 50 feet with the least restrictive device within 7 treatment day(s). (4.) Carmelo Hernández will perform standing static and dynamic balance activities x 20 minutes with SUPERVISION to improve safety within 7 treatment day(s). (5.) Carmelo Hernández will perform bilateral lower extremity exercises x 20 min for HEP with SUPERVISION to improve strength, endurance, and functional mobility within 7 treatment day(s). PHYSICAL THERAPY: Daily Note and PM Treatment Day # 5    Carmelo Hernández is a 80 y.o. female   PRIMARY DIAGNOSIS: Intertrochanteric fracture of left femur (HCC)  Intertrochanteric fracture of left femur (HCC) [S72.142A]  Procedure(s) (LRB):  OPEN TREATMENT OF LEFT PROXIMAL FEMUR FRACTURE WITH GAMMA INTRAMEDULLARY NAIL (Left)  5 Days Post-Op    ASSESSMENT:     REHAB RECOMMENDATIONS: CURRENT LEVEL OF FUNCTION:  (Most Recently Demonstrated)   Recommendation to date pending progress:  Setting:   Short-term Rehab  Equipment:    To Be Determined Bed Mobility:   Moderate Assistance x 2  Sit to Stand:   Minimal Assistance  Transfers:   Minimal Assistance  Gait/Mobility:   Minimal Assistance     ASSESSMENT:  Ms. Karen Hussein was in chair from with cochlear implants in place. . She stood and took steps to the chair with RW. She required repeated cues for walker use. She is slow and additional time is needed. SUBJECTIVE:   Ms. Karen Hussein states \"I was waiting on you\".     SOCIAL HISTORY/ LIVING ENVIRONMENT: PLOF: per chat review came from [facility], was ind with   ADL's, using RW for ambulation    Support Systems: Child(justin) (daughter Inna Sanford [phone])  OBJECTIVE:     PAIN: VITAL SIGNS: LINES/DRAINS:   Pre Treatment:    Post Treatment: 4   none  O2 Device: Hi flow nasal cannula     MOBILITY: I Mod I S SBA CGA Min Mod Max Total  NT x2 Comments:   Bed Mobility    Rolling [] [] [] [] [] [] [] [] [] [] []    Supine to Sit [] [] [] [] [] [] [x] [] [] [] [x]    Scooting [] [] [] [] [] [] [x] [] [] [] []    Sit to Supine [] [] [] [] [] [] [] [] [] [] []    Transfers    Sit to Stand [] [] [] [] [] [] [x] [] [] [] [x] Bed elevated   Bed to Chair [] [] [] [] [] [] [] [] [] [] []    Stand to Sit [] [] [] [] [] [] [x] [] [] [] [x]    I=Independent, Mod I=Modified Independent, S=Supervision, SBA=Standby Assistance, CGA=Contact Guard Assistance,   Min=Minimal Assistance, Mod=Moderate Assistance, Max=Maximal Assistance, Total=Total Assistance, NT=Not Tested    BALANCE: Good Fair+ Fair Fair- Poor NT Comments   Sitting Static [] [x] [] [] [] []    Sitting Dynamic [] [] [x] [] [] []              Standing Static [] [] [x] [] [] []    Standing Dynamic [] [] [] [x] [] []      GAIT: I Mod I S SBA CGA Min Mod Max Total  NT x2 Comments:   Level of Assistance [] [] [] [] [] [x] [] [] [] [] []    Distance 4'    DME Rolling Walker    Gait Quality Trunk flexion, narrow NATALIA    Weightbearing  Status WBAT     I=Independent, Mod I=Modified Independent, S=Supervision, SBA=Standby Assistance, CGA=Contact Guard Assistance,   Min=Minimal Assistance, Mod=Moderate Assistance, Max=Maximal Assistance, Total=Total Assistance, NT=Not Tested    PLAN:   FREQUENCY/DURATION: PT Plan of Care: BID for duration of hospital stay or until stated goals are met, whichever comes first.  TREATMENT:     TREATMENT:   ($$ Therapeutic Activity: 23-37 mins    )  Therapeutic Activity (23 Minutes):  Therapeutic activity included Sit to Supine, Scooting, Transfer Training, Ambulation on level ground, Sitting balance  and Standing balance to improve functional Mobility, Strength and Activity tolerance.     TREATMENT GRID:  N/A    AFTER TREATMENT POSITION/PRECAUTIONS:  Alarm Activated, Chair and Needs within reach    INTERDISCIPLINARY COLLABORATION:  RN/PCT, PT/PTA and OT/ROY    TOTAL TREATMENT DURATION:  PT Patient Time In/Time Out  Time In: 1335  Time Out: [address], PTA

## 2021-11-22 NOTE — PROGRESS NOTES
ACUTE PHYSICAL THERAPY GOALS:  (Developed with and agreed upon by patient and/or caregiver.)     (1.) Ramiro Coffman Brace  will move from supine to sit and sit to supine  with SUPERVISION within 7 treatment day(s). (2.) Cuate Silva will transfer from bed to chair and chair to bed with SUPERVISION using the least restrictive device within 7 treatment day(s). (3.) Cuate Silva will ambulate with SUPERVISION for 50 feet with the least restrictive device within 7 treatment day(s). (4.) Cuate Silva will perform standing static and dynamic balance activities x 20 minutes with SUPERVISION to improve safety within 7 treatment day(s). (5.) Cuate Silva will perform bilateral lower extremity exercises x 20 min for HEP with SUPERVISION to improve strength, endurance, and functional mobility within 7 treatment day(s). PHYSICAL THERAPY: Daily Note and AM Treatment Day # 5    Cuate Silva is a 80 y.o. female   PRIMARY DIAGNOSIS: Intertrochanteric fracture of left femur (HCC)  Intertrochanteric fracture of left femur (HCC) [S72.142A]  Procedure(s) (LRB):  OPEN TREATMENT OF LEFT PROXIMAL FEMUR FRACTURE WITH GAMMA INTRAMEDULLARY NAIL (Left)  5 Days Post-Op    ASSESSMENT:     REHAB RECOMMENDATIONS: CURRENT LEVEL OF FUNCTION:  (Most Recently Demonstrated)   Recommendation to date pending progress:  Setting:   Short-term Rehab  Equipment:    To Be Determined Bed Mobility:   Moderate Assistance x 2  Sit to Stand:   Minimal Assistance  Transfers:   Moderate Assistance x 2  Gait/Mobility:   Moderate Assistance x 2     ASSESSMENT:  Ms. Lucia Vitale was supine in bed with cochlear implants in place. She required a little more assistance this AM. She took steps to the chair with RW. She required repeated cues for walker use. Once settled in chair she performed B LE ex. She is slow and additional time is needed but she is willing to participate. SUBJECTIVE:   Ms. Lucia Vitale states Erla Ohm does my leg hurt so bad? \".     SOCIAL HISTORY/ LIVING ENVIRONMENT: PLOF: per chat review came from [facility], was ind with   ADL's, using RW for ambulation    Support Systems: Child(justin) (daughter Dona Hernández [phone])  OBJECTIVE:     PAIN: VITAL SIGNS: LINES/DRAINS:   Pre Treatment:    Post Treatment: 4   none  O2 Device: Hi flow nasal cannula     MOBILITY: I Mod I S SBA CGA Min Mod Max Total  NT x2 Comments:   Bed Mobility    Rolling [] [] [] [] [] [] [] [] [] [] []    Supine to Sit [] [] [] [] [] [] [x] [] [] [] [x]    Scooting [] [] [] [] [] [] [x] [] [] [] []    Sit to Supine [] [] [] [] [] [] [] [] [] [] []    Transfers    Sit to Stand [] [] [] [] [] [] [x] [] [] [] [x] Bed elevated   Bed to Chair [] [] [] [] [] [] [] [] [] [] []    Stand to Sit [] [] [] [] [] [] [x] [] [] [] [x]    I=Independent, Mod I=Modified Independent, S=Supervision, SBA=Standby Assistance, CGA=Contact Guard Assistance,   Min=Minimal Assistance, Mod=Moderate Assistance, Max=Maximal Assistance, Total=Total Assistance, NT=Not Tested    BALANCE: Good Fair+ Fair Fair- Poor NT Comments   Sitting Static [] [x] [] [] [] []    Sitting Dynamic [] [] [x] [] [] []              Standing Static [] [] [x] [] [] []    Standing Dynamic [] [] [] [x] [] []      GAIT: I Mod I S SBA CGA Min Mod Max Total  NT x2 Comments:   Level of Assistance [] [] [] [] [] [x] [] [] [] [] []    Distance 4'    DME Rolling Walker    Gait Quality Trunk flexion, narrow NATALIA    Weightbearing  Status WBAT     I=Independent, Mod I=Modified Independent, S=Supervision, SBA=Standby Assistance, CGA=Contact Guard Assistance,   Min=Minimal Assistance, Mod=Moderate Assistance, Max=Maximal Assistance, Total=Total Assistance, NT=Not Tested    PLAN:   FREQUENCY/DURATION: PT Plan of Care: BID for duration of hospital stay or until stated goals are met, whichever comes first.  TREATMENT:     TREATMENT:   ($$ Therapeutic Activity: 23-37 mins    )  Therapeutic Activity (23 Minutes):  Therapeutic activity included Supine to Sit, Scooting, Transfer Training, Sitting balance  and Standing balance to improve functional Mobility, Strength and Activity tolerance.     TREATMENT GRID:  N/A    AFTER TREATMENT POSITION/PRECAUTIONS:  Alarm Activated, Chair and Needs within reach    INTERDISCIPLINARY COLLABORATION:  RN/PCT, PT/PTA and OT/ROY    TOTAL TREATMENT DURATION:  PT Patient Time In/Time Out  Time In: 1027  Time Out: Esau Sweet PTA

## 2021-11-22 NOTE — PROGRESS NOTES
ACUTE OT GOALS:  (Developed with and agreed upon by patient and/or caregiver.)  1. Patient will complete functional transfers with contact guard assistance with adaptive equipment as needed. 2. Patient will complete lower body bathing and dressing with minimal assistance and adaptive equipment as needed. 3. Patient will complete toileting and toilet transfer with minimal assistance. 4. Patient will tolerate 30 minutes of OT treatment with self- incorporated rest breaks to increase activity tolerance for ADLs. 5. Patient will participate in at least 15 minutes of BUE therapeutic exercises to strengthen BUE for functional transfers.      Timeframe: 7 visits        OCCUPATIONAL THERAPY: Daily Note OT Treatment Day # 3    Evangelista Sterling is a 80 y.o. female   PRIMARY DIAGNOSIS: Intertrochanteric fracture of left femur (HCC)  Intertrochanteric fracture of left femur ([facility] [address] [S72.142A]  Procedure(s) (LRB):  OPEN TREATMENT OF LEFT PROXIMAL FEMUR FRACTURE WITH GAMMA INTRAMEDULLARY NAIL (Left)  5 Days Post-Op  Payor: [employer] MEDICARE / Plan: [address] [place] [address] HMO / Product Type: Managed Care Medicare /   ASSESSMENT:     REHAB RECOMMENDATIONS: CURRENT LEVEL OF FUNCTION:  (Most Recently Demonstrated)   Recommendation to date pending progress:  Setting:   Short-term Rehab  Equipment:    To Be Determined Bathing:   Not tested  Dressing:   Not tested  Feeding/Grooming:   Supervision for washing face  Toileting:   Not tested  Functional Mobility:   Moderate Assistance x 2     ASSESSMENT:  Ms. Deborah Brennan is doing fair today. Pt continue to require max A x2 for bed mobility today. Pt demonstrates fair sitting balance at edge of bed. Pt was able to stand with mod A x2 and take a few steps to chair with min A x2. Pt performed UE exercises to increase strength and activity tolerance. Pt tolerated exercises well. Pt left in chair with all needs in reach. Good session for patient today.  Will continue to benefit from skilled OT during stay.      SUBJECTIVE:   Ms. Ribeiro Stable states, \"My name is Dandre\"    SOCIAL HISTORY/LIVING ENVIRONMENT:   Support Systems: Child(justin) (daughter Jorge Fess [phone])    OBJECTIVE:     PAIN: VITAL SIGNS: LINES/DRAINS:   Pre Treatment: Pain Screen  Pain Scale 1: Numeric (0 - 10)  Pain Intensity 1: 0  Post Treatment: 0   N/A  O2 Device: Hi flow nasal cannula     ACTIVITIES OF DAILY LIVING: I Mod I S SBA CGA Min Mod Max Total NT Comments   BASIC ADLs:              Bathing/ Showering [] [] [] [] [] [] [] [] [] [x]    Toileting [] [] [] [] [] [] [] [] [] [x]    Dressing [] [] [] [] [] [] [] [] [] [x]    Feeding [] [] [] [] [] [] [] [] [] [x]    Grooming [] [] [x] [] [] [] [] [] [] []    Personal Device Care [] [] [] [] [] [] [] [] [] [x]    Functional Mobility [] [] [] [] [] [x] [] [] [] [] x2   I=Independent, Mod I=Modified Independent, S=Supervision, SBA=Standby Assistance, CGA=Contact Guard Assistance,   Min=Minimal Assistance, Mod=Moderate Assistance, Max=Maximal Assistance, Total=Total Assistance, NT=Not Tested    MOBILITY: I Mod I S SBA CGA Min Mod Max Total  NT x2 Comments:   Supine to sit [] [] [] [] [] [] [x] [] [] [] [x]    Sit to supine [] [] [] [] [] [] [] [] [] [x] []    Sit to stand [] [] [] [] [] [x] [] [] [] [] [x]    Bed to chair [] [] [] [] [] [x] [] [] [] [] [x]    I=Independent, Mod I=Modified Independent, S=Supervision, SBA=Standby Assistance, CGA=Contact Guard Assistance,   Min=Minimal Assistance, Mod=Moderate Assistance, Max=Maximal Assistance, Total=Total Assistance, NT=Not Tested    BALANCE: Good Fair+ Fair Fair- Poor NT Comments   Sitting Static [] [] [x] [] [] []    Sitting Dynamic [] [] [x] [] [] []              Standing Static [] [] [] [x] [] []    Standing Dynamic [] [] [] [x] [] []      PLAN:   FREQUENCY/DURATION: OT Plan of Care: 5 times/week for duration of hospital stay or until stated goals are met, whichever comes first.    TREATMENT:   TREATMENT:   ( $$ Therapeutic Exercises: 8-22 mins  $$ Neuromuscular Re-Education: 8-22 mins   )  Co-Treatment PT/OT necessary due to patient's decreased overall endurance/tolerance levels, as well as need for high level skilled assistance to complete functional transfers/mobility and functional tasks  Therapeutic Exercise (10 Minutes): Therapeutic exercises noted below to improve functional activity tolerance, strength and mobility. Neuromuscular Re-education (15 Minutes): Neuromuscular Re-education included Balance Training, Coordination training, Postural training, Sitting balance training and Standing balance training to improve Balance, Coordination, Functional Mobility and Postural Control.     TREATMENT GRID:   Date:  11/19/21 Date:  11/22/21 Date:     Activity/Exercise Parameters Parameters Parameters   Scapular Retraction 10 reps     Elbow Flexion 10 reps 10 reps    Punches 10 reps 10 reps    Shoulder Abd/Adduction  10 reps                        AFTER TREATMENT POSITION/PRECAUTIONS:  Alarm Activated, Chair, Needs within reach and Visitors at bedside    INTERDISCIPLINARY COLLABORATION:  RN/PCT, PT/PTA and OT/ROY    TOTAL TREATMENT DURATION:  OT Patient Time In/Time Out  Time In: 1027  Time Out: 1051    RON Ferrell

## 2021-11-22 NOTE — PROGRESS NOTES
Problem: Falls - Risk of  Goal: *Absence of Falls  Description: Document Emily Rodriguez Fall Risk and appropriate interventions in the flowsheet.   Outcome: Progressing Towards Goal  Note: Fall Risk Interventions:  Mobility Interventions: Bed/chair exit alarm, Patient to call before getting OOB    Mentation Interventions: Adequate sleep, hydration, pain control, Bed/chair exit alarm    Medication Interventions: Bed/chair exit alarm, Patient to call before getting OOB, Teach patient to arise slowly    Elimination Interventions: Bed/chair exit alarm, Call light in reach, Patient to call for help with toileting needs    History of Falls Interventions: Bed/chair exit alarm, Door open when patient unattended

## 2021-11-23 VITALS
BODY MASS INDEX: 23.32 KG/M2 | HEIGHT: 65 IN | SYSTOLIC BLOOD PRESSURE: 134 MMHG | WEIGHT: 140 LBS | TEMPERATURE: 98.6 F | HEART RATE: 98 BPM | OXYGEN SATURATION: 94 % | RESPIRATION RATE: 20 BRPM | DIASTOLIC BLOOD PRESSURE: 64 MMHG

## 2021-11-23 LAB — HGB BLD-MCNC: 8.3 G/DL (ref 11.7–15.4)

## 2021-11-23 PROCEDURE — 97110 THERAPEUTIC EXERCISES: CPT

## 2021-11-23 PROCEDURE — 74011250637 HC RX REV CODE- 250/637: Performed by: FAMILY MEDICINE

## 2021-11-23 PROCEDURE — 36415 COLL VENOUS BLD VENIPUNCTURE: CPT

## 2021-11-23 PROCEDURE — 97530 THERAPEUTIC ACTIVITIES: CPT

## 2021-11-23 PROCEDURE — 85018 HEMOGLOBIN: CPT

## 2021-11-23 PROCEDURE — 74011250637 HC RX REV CODE- 250/637: Performed by: ORTHOPAEDIC SURGERY

## 2021-11-23 RX ORDER — POLYETHYLENE GLYCOL 3350 17 G/17G
17 POWDER, FOR SOLUTION ORAL DAILY
Qty: 10 PACKET | Refills: 0 | Status: SHIPPED
Start: 2021-11-23 | End: 2021-12-03

## 2021-11-23 RX ORDER — HYDROCODONE BITARTRATE AND ACETAMINOPHEN 7.5; 325 MG/1; MG/1
1 TABLET ORAL
Qty: 12 TABLET | Refills: 0 | Status: SHIPPED | OUTPATIENT
Start: 2021-11-23 | End: 2021-11-26

## 2021-11-23 RX ORDER — FERROUS SULFATE, DRIED 160(50) MG
1 TABLET, EXTENDED RELEASE ORAL
Qty: 90 TABLET | Refills: 2 | Status: SHIPPED
Start: 2021-11-23 | End: 2021-12-23

## 2021-11-23 RX ORDER — ASPIRIN 325 MG
325 TABLET, DELAYED RELEASE (ENTERIC COATED) ORAL DAILY
Qty: 29 TABLET | Refills: 0 | Status: SHIPPED
Start: 2021-11-24 | End: 2021-12-23

## 2021-11-23 RX ADMIN — AMLODIPINE BESYLATE 10 MG: 10 TABLET ORAL at 08:05

## 2021-11-23 RX ADMIN — Medication 10 ML: at 04:39

## 2021-11-23 RX ADMIN — ASPIRIN 325 MG: 325 TABLET, COATED ORAL at 08:05

## 2021-11-23 RX ADMIN — Medication 1 TABLET: at 08:06

## 2021-11-23 RX ADMIN — MEMANTINE 5 MG: 5 TABLET ORAL at 08:06

## 2021-11-23 NOTE — PROGRESS NOTES
Insurance approval received. Pt is medically cleared for dc today and will transfer to [facility] at [facility] for [employer]. Transport scheduled through [employer]. ISSA spoke with pt's dtr, who is at the bedside, and alerted her to the pt's dc and transport time. SW remains available to assist as needed. Care Management Interventions  PCP Verified by CM: Yes (Dr Gee Miller-- last visit in office was in August 2021)  Mode of Transport at Discharge: [address]  [address] Time of Discharge: [address] (CM Consult): SNF  Partner SNF: Yes  MyChart Signup: No  Discharge Durable Medical Equipment: No  Physical Therapy Consult: Yes  Occupational Therapy Consult: Yes  Speech Therapy Consult: No  Support Systems: Child(justin) (daughter Avis Louise [phone])  Confirm Follow Up Transport: Family  The Plan for Transition of Care is Related to the Following Treatment Goals : STR to improve pt's strength and functional abilities for a safe transition to home.   The Patient and/or Patient Representative was Provided with a Choice of Provider and Agrees with the Discharge Plan?: Yes  Name of the Patient Representative Who was Provided with a Choice of Provider and Agrees with the Discharge Plan: marysol/Edna  Freedom of Choice List was Provided with Basic Dialogue that Supports the Patient's Individualized Plan of Care/Goals, Treatment Preferences and Shares the Quality Data Associated with the Providers?: Yes  Discharge Location  Discharge Placement: Rehab Unit Subacute ([facility])

## 2021-11-23 NOTE — PROGRESS NOTES
Progress Note    Patient: Liza Orlando MRN: [id number]  SSN: [id number]    YOB: 1935  Age: 80 y.o. Sex: female      Admit Date: 11/17/2021    LOS: 6 days     Assessment and Plan:     1. Intertrochanteric left femoral fracture, stable s/p ORIF  2. Hypokalemia, replaced  3- Hemoglobin mild drop, postop, to follow- stabilized  3. HTN, controlled  4- Abnormal UA, asymptomatic    Rx:  Pain control is optimal, currently on Norco as needed. Continue aspirin 325 mg p.o. daily for DVT prophylaxis, EOT 12/22  Continue Os-Helio 1 tab p.o. 3 times daily  Blood pressure is optimally controlled with amlodipine    Dispo: rehab when available    Subjective:   80 y.o. female with medical history of dementia, hypertension, anxiety that presented after a fall. 11/23:  Patient seen at bedside, hard of hearing. Per nursing staff pain is optimally controlled, patient denies any chest pain, cough, shortness of breath, fever chills or diarrhea. Objective:     Vitals:    11/22/21 1912 11/22/21 2341 11/23/21 0438 11/23/21 0714   BP: (!) 127/50 (!) 130/49 (!) 146/56 (!) 145/60   Pulse: 82 83 93 95   Resp: 18 18 18 19   Temp: 98.5 °F (36.9 °C) 98.2 °F (36.8 °C) 98.3 °F (36.8 °C) 98.2 °F (36.8 °C)   SpO2: 95% 92% 91% 92%   Weight:       Height:            Intake and Output:  Current Shift: No intake/output data recorded.   Last three shifts: 11/21 1901 - 11/23 0700  In: -   Out: 100 [Urine:100]    ROS  10 ROS negative except from stated on subjective    Physical Exam:   General: Elderly, confused at baseline, NAD  Cardiovascular: Regular rhythm, normal S1-S2, no murmurs  Respiratory: CTA B, no wheezing or rales  Abdomen: Soft, NT, ND  Back: No CVA tenderness, no paraspinal tenderness  Extremities: LE without pedal edema, no erythema  Neuro: no focal deficits  Skin: no rash or ulcers  Psych: good mood and affect    Lab/Data Review:  I have personally reviewed patients laboratory data showing  Recent Results (from the past 24 hour(s))   HEMOGLOBIN    Collection Time: 11/23/21  5:53 AM   Result Value Ref Range    HGB 8.3 (L) 11.7 - 15.4 g/dL      All Micro Results     Procedure Component Value Units Date/Time    COVID-19 RAPID TEST [[id number]] Collected: 11/17/21 1012    Order Status: Completed Specimen: Nasopharyngeal Updated: 11/17/21 1042     Specimen source NASAL        Comment: RAPID ONLY        COVID-19 rapid test Not detected        Comment:      The specimen is NEGATIVE for SARS-CoV-2, the novel coronavirus associated with COVID-19. A negative result does not rule out COVID-19. This test has been authorized by the FDA under an Emergency Use Authorization (EUA) for use by authorized laboratories. Fact sheet for Healthcare Providers: [employer]Update.co.nz  Fact sheet for Patients: [employer]nz       Methodology: Isothermal Nucleic Acid Amplification                Image:  I have personally reviewed patients imaging showing  XR HIP LT W OR WO PELV 2-3 VWS   Final Result   Post internal fixation of a left hip fracture         NC XR TECHNOLOGIST SERVICE   Final Result      A Radiologist has not reviewed images associated with this exam.      XR FEMUR LT 2 V   Final Result   4 intraoperative C-arm views during placement of dynamic left hip   screw and intramedullary adrianne are noted reducing a proximal femoral fracture. XR HIP LT W OR WO PELV 2-3 VWS   Final Result   Negative for acute change. Mild angulation proximal left humerus   fracture. PELVIS AND LEFT HIP, 3 views. INDICATION: Hip pain following fall. TECHNIQUE: AP view of the pelvis and coned down AP and frogleg lateral of the   hip. FINDINGS:    Pelvic bony ring: Appears intact. SI joints: Appear symmetric. Pubic rami: Appear intact. Lower lumbar spine: Mild DJD      Femoral head:  Has a round smooth contour. Joint space: Mildly narrowed.    Femoral neck and proximal femoral shaft:  Intertrochanteric fracture with mild   varus deformity      IMPRESSION:  Intertrochanteric fracture with mild varus deformity. LEFT FEMUR 3 view(s). INDICATION: Hip pain following fall today. TECHNIQUE: AP and lateral views. COMPARISON: None. FINDINGS / IMPRESSION: Intertrochanteric fracture. Mid and distal femoral shaft   intact. Osteopenia. XR FEMUR LT 2 V   Final Result   Negative for acute change. Mild angulation proximal left humerus   fracture. PELVIS AND LEFT HIP, 3 views. INDICATION: Hip pain following fall. TECHNIQUE: AP view of the pelvis and coned down AP and frogleg lateral of the   hip. FINDINGS:    Pelvic bony ring: Appears intact. SI joints: Appear symmetric. Pubic rami: Appear intact. Lower lumbar spine: Mild DJD      Femoral head:  Has a round smooth contour. Joint space: Mildly narrowed. Femoral neck and proximal femoral shaft:  Intertrochanteric fracture with mild   varus deformity      IMPRESSION:  Intertrochanteric fracture with mild varus deformity. LEFT FEMUR 3 view(s). INDICATION: Hip pain following fall today. TECHNIQUE: AP and lateral views. COMPARISON: None. FINDINGS / IMPRESSION: Intertrochanteric fracture. Mid and distal femoral shaft   intact. Osteopenia. XR CHEST SNGL V   Final Result   Negative for acute change. Mild angulation proximal left humerus   fracture. PELVIS AND LEFT HIP, 3 views. INDICATION: Hip pain following fall. TECHNIQUE: AP view of the pelvis and coned down AP and frogleg lateral of the   hip. FINDINGS:    Pelvic bony ring: Appears intact. SI joints: Appear symmetric. Pubic rami: Appear intact. Lower lumbar spine: Mild DJD      Femoral head:  Has a round smooth contour. Joint space: Mildly narrowed.    Femoral neck and proximal femoral shaft: Intertrochanteric fracture with mild   varus deformity      IMPRESSION:  Intertrochanteric fracture with mild varus deformity. LEFT FEMUR 3 view(s). INDICATION: Hip pain following fall today. TECHNIQUE: AP and lateral views. COMPARISON: None. FINDINGS / IMPRESSION: Intertrochanteric fracture. Mid and distal femoral shaft   intact. Osteopenia.                                       Hospital problems     Patient Active Problem List   Diagnosis Code    Essential hypertension I10    Vertigo R42    Type 2 diabetes mellitus without complication, without long-term current use of insulin ([address] E11.9    Bilateral hearing loss H91.93    Seasonal allergic rhinitis due to pollen J30.1    Memory difficulties R41.3    Anxiety F41.9    Intertrochanteric fracture of left femur ([address] S72.142A    Hypokalemia E87.6          Signed By: Javi Chin MD     November 23, 2021

## 2021-11-23 NOTE — PROGRESS NOTES
Still awaiting authorization for admission to [facility]. Patient discharge packet created and patient placed on Will Call for [facility] with reference number [id number].

## 2021-11-23 NOTE — PROGRESS NOTES
Problem: Falls - Risk of  Goal: *Absence of Falls  Description: Document Green Salvia Fall Risk and appropriate interventions in the flowsheet. 11/23/2021 1617 by Tatum Velázquez RN  Outcome: Resolved/Met  Note: Fall Risk Interventions:  Mobility Interventions: Bed/chair exit alarm, Patient to call before getting OOB    Mentation Interventions: Adequate sleep, hydration, pain control, Bed/chair exit alarm, Door open when patient unattended    Medication Interventions: Bed/chair exit alarm, Assess postural VS orthostatic hypotension, Teach patient to arise slowly, Patient to call before getting OOB    Elimination Interventions: Bed/chair exit alarm, Call light in reach, Patient to call for help with toileting needs    History of Falls Interventions: Bed/chair exit alarm, Door open when patient unattended      11/23/2021 0722 by Tatum Velázquez RN  Outcome: Progressing Towards Goal  Note: Fall Risk Interventions:  Mobility Interventions: Bed/chair exit alarm, Patient to call before getting OOB    Mentation Interventions: Adequate sleep, hydration, pain control, Bed/chair exit alarm, Door open when patient unattended    Medication Interventions: Bed/chair exit alarm, Assess postural VS orthostatic hypotension, Teach patient to arise slowly, Patient to call before getting OOB    Elimination Interventions: Bed/chair exit alarm, Call light in reach, Patient to call for help with toileting needs    History of Falls Interventions: Bed/chair exit alarm, Door open when patient unattended         Problem: Patient Education: Go to Patient Education Activity  Goal: Patient/Family Education  11/23/2021 1617 by Tatum Velázquez RN  Outcome: Resolved/Met  11/23/2021 0722 by Tatum Velázquez RN  Outcome: Progressing Towards Goal     Problem: Pressure Injury - Risk of  Goal: *Prevention of pressure injury  Description: Document Srinivas Scale and appropriate interventions in the flowsheet.   11/23/2021 1617 by Ulysses Pale, RN  Outcome: Resolved/Met  Note: Pressure Injury Interventions:  Sensory Interventions: Assess changes in LOC    Moisture Interventions: Limit adult briefs, Maintain skin hydration (lotion/cream), Minimize layers, Moisture barrier    Activity Interventions: PT/OT evaluation, Pressure redistribution bed/mattress(bed type), Increase time out of bed    Mobility Interventions: PT/OT evaluation, Pressure redistribution bed/mattress (bed type)    Nutrition Interventions: Document food/fluid/supplement intake    Friction and Shear Interventions: Minimize layers, HOB 30 degrees or less             11/23/2021 0722 by Ulysses Pale, RN  Outcome: Progressing Towards Goal  Note: Pressure Injury Interventions:  Sensory Interventions: Assess changes in LOC    Moisture Interventions: Limit adult briefs, Maintain skin hydration (lotion/cream), Minimize layers, Moisture barrier    Activity Interventions: PT/OT evaluation, Pressure redistribution bed/mattress(bed type), Increase time out of bed    Mobility Interventions: PT/OT evaluation, Pressure redistribution bed/mattress (bed type)    Nutrition Interventions: Document food/fluid/supplement intake    Friction and Shear Interventions: Minimize layers, HOB 30 degrees or less

## 2021-11-23 NOTE — DISCHARGE SUMMARY
Hospitalist Discharge Summary     Patient ID:  Jorge Flor  [id number]  56 y.o.  1935  Admit date: 11/17/2021  8:03 AM  Discharge date and time: 11/23/2021  Attending: Geri Sprague MD  PCP:  Isaías Gutierrez MD  Treatment Team: Attending Provider: Geri Sprague MD; Surgeon: Dilip Darnell MD; Utilization Review: Mitul Hinson RN; Care Manager: Be Mcdonough RN; Charge Nurse: Kelsey Munguia Primary Nurse: Scotty Nuñez RN; Physical Therapy Assistant: Nano Dillon PTA; Occupational Therapy Assistant: RON Rojas    Principal Diagnosis Intertrochanteric fracture of left femur [facility])   Principal Problem:    Intertrochanteric fracture of left femur ([address] (11/17/2021)    Active Problems:    Essential hypertension (3/5/2018)      Bilateral hearing loss (3/5/2018)      Overview: S/p cochlear implant 2017      Memory difficulties (11/2/2020)      Anxiety (12/7/2020)      Hypokalemia (11/17/2021)       80 y. o. female with medical history of dementia, hypertension, anxiety admitted on 11/17 following a fall in assisted living facility resulting in left intertrochanteric femur fracture. Hospital Course:  1. Intertrochanteric left femoral fracture, stable s/p ORIF  2. Hypokalemia, replaced  3- Hemoglobin mild drop, postop, to follow- stabilized  3. HTN, controlled  4- Abnormal UA, asymptomatic     Rx:  Status post ORIF, POD #6  Pain control is optimal, currently on Norco as needed. Continue aspirin 325 mg p.o. daily for DVT prophylaxis, EOT 12/22  Continue Os-Helio 1 tab p.o. 3 times daily  Blood pressure is optimally controlled with amlodipine     Dispo:   Patient is medically clear and hemodynamically stable for discharge today. Significant Diagnostic Studies:   CHEST X-RAY, one view.     INDICATION:  Dizziness and fall.   Preoperative evaluation for hip fracture  surgical repair.      TECHNIQUE:  AP supine view.     COMPARISON: One October 2021     FINDINGS: Lungs: are clear. Costophrenic angles: are sharp. Heart size: is normal.   Pulmonary vasculature: is unremarkable. Aorta: Mildly tortuous. Included portion of the upper abdomen: is unremarkable. Bones: Mild angulation of the proximal left humerus fracture  Other: None.     IMPRESSION  Negative for acute change. Mild angulation proximal left humerus  fracture.           PELVIS AND LEFT HIP, 3 views.     INDICATION: Hip pain following fall.     TECHNIQUE: AP view of the pelvis and coned down AP and frogleg lateral of the  hip.     FINDINGS:   Pelvic bony ring: Appears intact. SI joints: Appear symmetric. Pubic rami: Appear intact. Lower lumbar spine: Mild DJD     Femoral head:  Has a round smooth contour. Joint space: Mildly narrowed. Femoral neck and proximal femoral shaft:  Intertrochanteric fracture with mild  varus deformity     IMPRESSION:  Intertrochanteric fracture with mild varus deformity.           LEFT FEMUR 3 view(s).     INDICATION: Hip pain following fall today.      TECHNIQUE: AP and lateral views.     COMPARISON: None.     FINDINGS / IMPRESSION: Intertrochanteric fracture. Mid and distal femoral shaft  intact. Osteopenia. Labs: Results:       Chemistry No results for input(s): GLU, NA, K, CL, CO2, BUN, CREA, CA, AGAP, BUCR, TBIL, AP, TP, ALB, GLOB, AGRAT in the last 72 hours. No lab exists for component: GPT   CBC w/Diff Recent Labs     11/23/21  0553 11/22/21  0511 11/21/21  0631   HGB 8.3* 9.0* 7.5*      Cardiac Enzymes No results for input(s): CPK, CKND1, MILDRED in the last 72 hours. No lab exists for component: CKRMB, TROIP   Coagulation No results for input(s): PTP, INR, APTT, INREXT in the last 72 hours.     Lipid Panel Lab Results   Component Value Date/Time    Cholesterol, total 233 (H) 05/17/2021 10:37 AM    HDL Cholesterol 53 05/17/2021 10:37 AM    LDL, calculated 159 (H) 05/17/2021 10:37 AM    LDL, calculated 136 (H) 09/10/2018 10:02 AM    VLDL, calculated 21 05/17/2021 10:37 AM    VLDL, calculated 35 09/10/2018 10:02 AM    Triglyceride 119 05/17/2021 10:37 AM      BNP No results for input(s): BNPP in the last 72 hours. Liver Enzymes No results for input(s): TP, ALB, TBIL, AP in the last 72 hours. No lab exists for component: SGOT, GPT, DBIL   Thyroid Studies Lab Results   Component Value Date/Time    TSH 4.770 (H) 10/26/2020 10:10 AM            Discharge Exam:  Visit Vitals  /64 (BP 1 Location: Left arm)   Pulse 98   Temp 98.6 °F (37 °C)   Resp 20   Ht 5' 5\" (1.651 m)   Wt 63.5 kg (140 lb)   SpO2 94%   BMI 23.30 kg/m²     General: Elderly, confused at baseline, NAD  Cardiovascular: Regular rhythm, normal S1-S2, no murmurs  Respiratory: CTA B, no wheezing or rales  Abdomen: Soft, NT, ND  Back: No CVA tenderness, no paraspinal tenderness  Extremities: LE without pedal edema, no erythema  Neuro: no focal deficits  Skin: no rash or ulcers  Psych: good mood and affect    Disposition: STR  Discharge Condition: stable  Patient Instructions:   Current Discharge Medication List      START taking these medications    Details   aspirin delayed-release 325 mg tablet Take 1 Tablet by mouth daily for 29 doses. Qty: 29 Tablet, Refills: 0  Start date: 11/24/2021, End date: 12/23/2021      calcium-vitamin D (OS-SELMA +D3) 500 mg-200 unit per tablet Take 1 Tablet by mouth three (3) times daily (with meals) for 30 days. Qty: 90 Tablet, Refills: 2  Start date: 11/23/2021, End date: 12/23/2021      HYDROcodone-acetaminophen (NORCO) 7.5-325 mg per tablet Take 1 Tablet by mouth every six (6) hours as needed for Pain for up to 3 days. Max Daily Amount: 4 Tablets. Qty: 12 Tablet, Refills: 0  Start date: 11/23/2021, End date: 11/26/2021    Associated Diagnoses: Closed fracture of left hip, initial encounter ([facility]      polyethylene glycol (MIRALAX) 17 gram packet Take 1 Packet by mouth daily for 10 days.   Qty: 10 Packet, Refills: 0  Start date: 11/23/2021, End date: 12/3/2021 CONTINUE these medications which have NOT CHANGED    Details   amLODIPine (NORVASC) 10 mg tablet Take 1 Tablet by mouth daily. cetirizine (ZYRTEC) 10 mg tablet Take 10 mg by mouth daily. D-MANNOSE PO Take 6,060 mg by mouth daily. Flaxseed Oil oil 1,400 mg by Does Not Apply route daily. memantine (Namenda) 5 mg tablet Take 1 Tablet by mouth daily. Qty: 90 Tablet, Refills: 1    Associated Diagnoses: Memory difficulties      hydrOXYzine HCL (ATARAX) 50 mg tablet Take 0.5 Tablets by mouth daily as needed for Itching. Qty: 30 Tablet, Refills: 1      triamcinolone acetonide (KENALOG) 0.1 % topical cream Apply  to affected area two (2) times a day.  use thin layer  Qty: 80 g, Refills: 5    Associated Diagnoses: Rash      lancets (One Touch Delica) 33 gauge misc One Touch Ultra 2 lancets, check fs every day, DM2, E11.9  Qty: 100 Lancet, Refills: 1    Associated Diagnoses: Type 2 diabetes mellitus without complication, without long-term current use of insulin ([facility])             Activity: PT/OT Eval and Treat  Diet: Cardiac Diet  Wound Care: None needed    Follow-up  · Follow with physician in short-term rehab  · Follow-up with Ortho in 10 to 14 days as scheduled  · Follow-up with PCP next 2 to 3 weeks  Time spent to discharge patient 35 minutes  Signed:  Russell Gonzalez MD  11/23/2021  1:56 PM

## 2021-11-23 NOTE — PROGRESS NOTES
Problem: Falls - Risk of  Goal: *Absence of Falls  Description: Document Kamlesh Doe Fall Risk and appropriate interventions in the flowsheet. Outcome: Progressing Towards Goal  Note: Fall Risk Interventions:  Mobility Interventions: Bed/chair exit alarm, Patient to call before getting OOB    Mentation Interventions: Adequate sleep, hydration, pain control, Bed/chair exit alarm, Door open when patient unattended    Medication Interventions: Bed/chair exit alarm, Assess postural VS orthostatic hypotension, Teach patient to arise slowly, Patient to call before getting OOB    Elimination Interventions: Bed/chair exit alarm, Call light in reach, Patient to call for help with toileting needs    History of Falls Interventions: Bed/chair exit alarm, Door open when patient unattended         Problem: Patient Education: Go to Patient Education Activity  Goal: Patient/Family Education  Outcome: Progressing Towards Goal     Problem: Pressure Injury - Risk of  Goal: *Prevention of pressure injury  Description: Document Srinivas Scale and appropriate interventions in the flowsheet.   Outcome: Progressing Towards Goal  Note: Pressure Injury Interventions:  Sensory Interventions: Assess changes in LOC    Moisture Interventions: Limit adult briefs, Maintain skin hydration (lotion/cream), Minimize layers, Moisture barrier    Activity Interventions: PT/OT evaluation, Pressure redistribution bed/mattress(bed type), Increase time out of bed    Mobility Interventions: PT/OT evaluation, Pressure redistribution bed/mattress (bed type)    Nutrition Interventions: Document food/fluid/supplement intake    Friction and Shear Interventions: Minimize layers, HOB 30 degrees or less                Problem: Patient Education: Go to Patient Education Activity  Goal: Patient/Family Education  Outcome: Progressing Towards Goal     Problem: Patient Education: Go to Patient Education Activity  Goal: Patient/Family Education  Outcome: Progressing Towards Goal     Problem: Hip Fracture:Day of Admission Pre-op  Goal: Off Pathway (Use only if patient is Off Pathway)  Outcome: Progressing Towards Goal  Goal: Activity/Safety  Outcome: Progressing Towards Goal  Goal: Consults, if ordered  Outcome: Progressing Towards Goal  Goal: Diagnostic Test/Procedures  Outcome: Progressing Towards Goal  Goal: Nutrition/Diet  Outcome: Progressing Towards Goal  Goal: Medications  Outcome: Progressing Towards Goal  Goal: Respiratory  Outcome: Progressing Towards Goal  Goal: Treatments/Interventions/Procedures  Outcome: Progressing Towards Goal  Goal: Psychosocial  Outcome: Progressing Towards Goal  Goal: *Labs/Tests Within Defined Limits in Preparation for Surgery  Outcome: Progressing Towards Goal  Goal: *Optimal pain control at patient's stated goal  Outcome: Progressing Towards Goal  Goal: *Hemodynamically stable  Outcome: Progressing Towards Goal     Problem: Hip Fracture: Post-Op Day 1  Goal: Off Pathway (Use only if patient is Off Pathway)  Outcome: Progressing Towards Goal  Goal: Activity/Safety  Outcome: Progressing Towards Goal  Goal: Diagnostic Test/Procedures  Outcome: Progressing Towards Goal  Goal: Nutrition/Diet  Outcome: Progressing Towards Goal  Goal: Medications  Outcome: Progressing Towards Goal  Goal: Respiratory  Outcome: Progressing Towards Goal  Goal: Treatments/Interventions/Procedures  Outcome: Progressing Towards Goal  Goal: Psychosocial  Outcome: Progressing Towards Goal  Goal: Discharge Planning  Outcome: Progressing Towards Goal  Goal: *Demonstrates progressive activity  Outcome: Progressing Towards Goal  Goal: *Optimal pain control at patient's stated goal  Outcome: Progressing Towards Goal  Goal: *Hemodynamically stable  Outcome: Progressing Towards Goal  Goal: *Discharge plan identified  Outcome: Progressing Towards Goal  Goal: *Absence of skin breakdown  Outcome: Progressing Towards Goal     Problem: Hip Fracture: Post-Op Day 2  Goal: Off Pathway (Use only if patient is Off Pathway)  Outcome: Progressing Towards Goal  Goal: Activity/Safety  Outcome: Progressing Towards Goal  Goal: Diagnostic Test/Procedures  Outcome: Progressing Towards Goal  Goal: Nutrition/Diet  Outcome: Progressing Towards Goal  Goal: Medications  Outcome: Progressing Towards Goal  Goal: Respiratory  Outcome: Progressing Towards Goal  Goal: Treatments/Interventions/Procedures  Outcome: Progressing Towards Goal  Goal: Psychosocial  Outcome: Progressing Towards Goal  Goal: Discharge Planning  Outcome: Progressing Towards Goal  Goal: *Optimal pain control at patient's stated goal  Outcome: Progressing Towards Goal  Goal: *Hemodynamically stable  Outcome: Progressing Towards Goal  Goal: *Adequate oxygenation  Outcome: Progressing Towards Goal  Goal: *Tolerates increased activity  Outcome: Progressing Towards Goal  Goal: *Tolerates nutrition therapy  Outcome: Progressing Towards Goal  Goal: *Absence of skin breakdown  Outcome: Progressing Towards Goal     Problem: Hip Fracture: Post-Op Day 3  Goal: Off Pathway (Use only if patient is Off Pathway)  Outcome: Progressing Towards Goal  Goal: Activity/Safety  Outcome: Progressing Towards Goal  Goal: Diagnostic Test/Procedures  Outcome: Progressing Towards Goal  Goal: Nutrition/Diet  Outcome: Progressing Towards Goal  Goal: Medications  Outcome: Progressing Towards Goal  Goal: Respiratory  Outcome: Progressing Towards Goal  Goal: Treatments/Interventions/Procedures  Outcome: Progressing Towards Goal  Goal: Psychosocial  Outcome: Progressing Towards Goal  Goal: Discharge Planning  Outcome: Progressing Towards Goal  Goal: *Met physical therapy criteria for discharge to next level of care  Outcome: Progressing Towards Goal  Goal: *Optimal pain control at patient's stated goal  Outcome: Progressing Towards Goal  Goal: *Hemodynamically stable  Outcome: Progressing Towards Goal  Goal: *Tolerating diet  Outcome: Progressing Towards Goal  Goal: *Active bowel function  Outcome: Progressing Towards Goal  Goal: *Adequate urinary output  Outcome: Progressing Towards Goal  Goal: *Absence of skin breakdown  Outcome: Progressing Towards Goal  Goal: *Patient verbalizes understanding of discharge instructions  Outcome: Progressing Towards Goal     Problem: Hip Fracture: Post-Op Day 4  Goal: Off Pathway (Use only if patient is Off Pathway)  Outcome: Progressing Towards Goal  Goal: Activity/Safety  Outcome: Progressing Towards Goal  Goal: Diagnostic Test/Procedures  Outcome: Progressing Towards Goal  Goal: Nutrition/Diet  Outcome: Progressing Towards Goal  Goal: Medications  Outcome: Progressing Towards Goal  Goal: Respiratory  Outcome: Progressing Towards Goal  Goal: Treatments/Interventions/Procedures  Outcome: Progressing Towards Goal  Goal: Psychosocial  Outcome: Progressing Towards Goal  Goal: Discharge Planning  Outcome: Progressing Towards Goal  Goal: *Met physical therapy criteria for discharge to next level of care  Outcome: Progressing Towards Goal  Goal: *Optimal pain control at patient's stated goal  Outcome: Progressing Towards Goal  Goal: *Hemodynamically stable  Outcome: Progressing Towards Goal  Goal: *Tolerating diet  Outcome: Progressing Towards Goal  Goal: *Active bowel function  Outcome: Progressing Towards Goal  Goal: *Adequate urinary output  Outcome: Progressing Towards Goal  Goal: *Absence of skin breakdown  Outcome: Progressing Towards Goal  Goal: *Patient verbalizes understanding of discharge instructions  Outcome: Progressing Towards Goal

## 2021-11-23 NOTE — PROGRESS NOTES
ACUTE PHYSICAL THERAPY GOALS:  (Developed with and agreed upon by patient and/or caregiver.)     (1.) Frankie Austin Brace  will move from supine to sit and sit to supine  with SUPERVISION within 7 treatment day(s). (2.) Jack Matthews will transfer from bed to chair and chair to bed with SUPERVISION using the least restrictive device within 7 treatment day(s). (3.) Jack Matthews will ambulate with SUPERVISION for 50 feet with the least restrictive device within 7 treatment day(s). (4.) Jack Matthews will perform standing static and dynamic balance activities x 20 minutes with SUPERVISION to improve safety within 7 treatment day(s). (5.) Jack Matthews will perform bilateral lower extremity exercises x 20 min for HEP with SUPERVISION to improve strength, endurance, and functional mobility within 7 treatment day(s). PHYSICAL THERAPY: Daily Note and AM Treatment Day # 6    Jack Matthews is a 80 y.o. female   PRIMARY DIAGNOSIS: Intertrochanteric fracture of left femur (HCC)  Intertrochanteric fracture of left femur ([address] [S72.142A]  Procedure(s) (LRB):  OPEN TREATMENT OF LEFT PROXIMAL FEMUR FRACTURE WITH GAMMA INTRAMEDULLARY NAIL (Left)  6 Days Post-Op    ASSESSMENT:     REHAB RECOMMENDATIONS: CURRENT LEVEL OF FUNCTION:  (Most Recently Demonstrated)   Recommendation to date pending progress:  Setting:   Short-term Rehab  Equipment:    To Be Determined Bed Mobility:   Minimal Assistance  Sit to Stand:   Minimal Assistance  Transfers:   Minimal Assistance  Gait/Mobility:   Contact Guard Assistance     ASSESSMENT:  Ms. Kenji Quevedo was supine and willing to participate. She performed supine exercises below with good ability. She sat up with minimal assist to the EOB. She struggled to stand and needed assist.  Once up she walked pretty well and was able to walk around the bed to the chair then get up again and use the commode. Steady progress     SUBJECTIVE:   Ms. Kenji Quevedo states \"ok\".     SOCIAL HISTORY/ LIVING ENVIRONMENT: PLOF: per chat review came from [facility], was ind with   ADL's, using RW for ambulation    Support Systems: Child(justin) (daughter Augusto Purchase [phone])  OBJECTIVE:     PAIN: VITAL SIGNS: LINES/DRAINS:   Pre Treatment: Pain Screen  Pain Scale 1: FLACC  Pain Intensity 1: 2  Post Treatment: 4   none  O2 Device: Hi flow nasal cannula     MOBILITY: I Mod I S SBA CGA Min Mod Max Total  NT x2 Comments:   Bed Mobility    Rolling [] [] [] [] [] [] [] [] [] [] []    Supine to Sit [] [] [] [] [] [x] [] [] [] [] []    Scooting [] [] [] [] [] [x] [x] [] [] [] []    Sit to Supine [] [] [] [] [] [] [] [] [] [] []    Transfers    Sit to Stand [] [] [] [] [] [x] [x] [] [] [] [] Bed elevated   Bed to Chair [] [] [] [] [] [] [] [] [] [] []    Stand to Sit [] [] [] [] [x] [x] [] [] [] [] []    I=Independent, Mod I=Modified Independent, S=Supervision, SBA=Standby Assistance, CGA=Contact Guard Assistance,   Min=Minimal Assistance, Mod=Moderate Assistance, Max=Maximal Assistance, Total=Total Assistance, NT=Not Tested    BALANCE: Good Fair+ Fair Fair- Poor NT Comments   Sitting Static [] [x] [] [] [] []    Sitting Dynamic [] [x] [] [] [] []              Standing Static [] [] [x] [] [] []    Standing Dynamic [] [] [x] [] [] []      GAIT: I Mod I S SBA CGA Min Mod Max Total  NT x2 Comments:   Level of Assistance [] [] [] [] [x] [] [] [] [] [] []    Distance 10'    DME Rolling Walker    Gait Quality Trunk flexion, narrow NATALIA    Weightbearing  Status WBAT     I=Independent, Mod I=Modified Independent, S=Supervision, SBA=Standby Assistance, CGA=Contact Guard Assistance,   Min=Minimal Assistance, Mod=Moderate Assistance, Max=Maximal Assistance, Total=Total Assistance, NT=Not Tested    PLAN:   FREQUENCY/DURATION: PT Plan of Care: BID for duration of hospital stay or until stated goals are met, whichever comes first.  TREATMENT:     TREATMENT:   ($$ Therapeutic Activity: 8-22 mins  $$ Therapeutic Exercises: 8-22 mins    )  Therapeutic Activity (20 Minutes): Therapeutic activity included Supine to Sit, Scooting, Transfer Training, Ambulation on level ground and Standing balance to improve functional Mobility, Strength and Activity tolerance. Therapeutic Exercise (10 Minutes): Therapeutic exercises noted below to improve functional activity tolerance, AROM, strength and mobility.      TREATMENT GRID:   Date:  11/23/21 Date:   Date:     Activity/Exercise Parameters Parameters Parameters   Supine AP 20x B     Supine heel slides 10x B AAL     Supine SAQ 10x B      Supine hip abd 10x B AAL                             AFTER TREATMENT POSITION/PRECAUTIONS:  Alarm Activated, Chair and Needs within reach    INTERDISCIPLINARY COLLABORATION:  RN/PCT and PT/PTA    TOTAL TREATMENT DURATION:  PT Patient Time In/Time Out  Time In: 1115  Time Out: 1145    Buster Ulrich PTA

## 2021-11-23 NOTE — PROGRESS NOTES
ACUTE PHYSICAL THERAPY GOALS:  (Developed with and agreed upon by patient and/or caregiver.)     (1.) Daisha Brownlee Brace  will move from supine to sit and sit to supine  with SUPERVISION within 7 treatment day(s). (2.) Sondra Askew will transfer from bed to chair and chair to bed with SUPERVISION using the least restrictive device within 7 treatment day(s). (3.) Sondra Askew will ambulate with SUPERVISION for 50 feet with the least restrictive device within 7 treatment day(s). (4.) Sondra Askew will perform standing static and dynamic balance activities x 20 minutes with SUPERVISION to improve safety within 7 treatment day(s). (5.) Sondra Askew will perform bilateral lower extremity exercises x 20 min for HEP with SUPERVISION to improve strength, endurance, and functional mobility within 7 treatment day(s). PHYSICAL THERAPY: Daily Note and PM Treatment Day # 6    Sondra Askew is a 80 y.o. female   PRIMARY DIAGNOSIS: Intertrochanteric fracture of left femur (HCC)  Intertrochanteric fracture of left femur ([address] [S72.142A]  Procedure(s) (LRB):  OPEN TREATMENT OF LEFT PROXIMAL FEMUR FRACTURE WITH GAMMA INTRAMEDULLARY NAIL (Left)  6 Days Post-Op    ASSESSMENT:     REHAB RECOMMENDATIONS: CURRENT LEVEL OF FUNCTION:  (Most Recently Demonstrated)   Recommendation to date pending progress:  Setting:   Short-term Rehab  Equipment:    To Be Determined Bed Mobility:   Minimal Assistance  Sit to Stand:   Minimal Assistance  Transfers:   Minimal Assistance  Gait/Mobility:   Contact Guard Assistance     ASSESSMENT:  Ms. Stephan Watkins was supine and willing to participate. She performed supine exercises below with good ability. She sat up with minimal assist to the EOB. She struggled to stand and needed assist.  Once up she walked pretty well and was able to walk around the bed to the chair then get up again and use the commode. Steady progress    PM:  Patient needed mod A x 2 to stand from recliner.   She took a few steps to the commode then to the bed. Going to rehab today. SUBJECTIVE:   Ms. Karen Hussein states \"ok\".     SOCIAL HISTORY/ LIVING ENVIRONMENT: PLOF: per chat review came from [facility], was ind with   ADL's, using RW for ambulation    Support Systems: Child(justin) (daughter Monalisa Redd [phone])  OBJECTIVE:     PAIN: VITAL SIGNS: LINES/DRAINS:   Pre Treatment: Pain Screen  Pain Scale 1: FLACC  Pain Intensity 1: 3  Post Treatment: 4   none  O2 Device: Hi flow nasal cannula     MOBILITY: I Mod I S SBA CGA Min Mod Max Total  NT x2 Comments:   Bed Mobility    Rolling [] [] [] [] [] [] [] [] [] [] []    Supine to Sit [] [] [] [] [] [x] [] [] [] [] []    Scooting [] [] [] [] [] [x] [x] [] [] [] []    Sit to Supine [] [] [] [] [] [] [] [] [] [] []    Transfers    Sit to Stand [] [] [] [] [] [x] [x] [] [] [] [] Bed elevated   Bed to Chair [] [] [] [] [] [] [] [] [] [] []    Stand to Sit [] [] [] [] [x] [x] [] [] [] [] []    I=Independent, Mod I=Modified Independent, S=Supervision, SBA=Standby Assistance, CGA=Contact Guard Assistance,   Min=Minimal Assistance, Mod=Moderate Assistance, Max=Maximal Assistance, Total=Total Assistance, NT=Not Tested    BALANCE: Good Fair+ Fair Fair- Poor NT Comments   Sitting Static [] [x] [] [] [] []    Sitting Dynamic [] [x] [] [] [] []              Standing Static [] [] [x] [] [] []    Standing Dynamic [] [] [x] [] [] []      GAIT: I Mod I S SBA CGA Min Mod Max Total  NT x2 Comments:   Level of Assistance [] [] [] [] [x] [] [] [] [] [] []    Distance 10'    DME Rolling Walker    Gait Quality Trunk flexion, narrow NATALIA    Weightbearing  Status WBAT     I=Independent, Mod I=Modified Independent, S=Supervision, SBA=Standby Assistance, CGA=Contact Guard Assistance,   Min=Minimal Assistance, Mod=Moderate Assistance, Max=Maximal Assistance, Total=Total Assistance, NT=Not Tested    PLAN:   FREQUENCY/DURATION: PT Plan of Care: BID for duration of hospital stay or until stated goals are met, whichever comes first.  TREATMENT:     TREATMENT:   ($$ Therapeutic Activity: 8-22 mins  $$ Therapeutic Exercises: 8-22 mins    )  Therapeutic Activity (15 Minutes): Therapeutic activity included Sit to Supine, Scooting, Transfer Training, Ambulation on level ground and Standing balance to improve functional Mobility, Strength and Activity tolerance.     TREATMENT GRID:   Date:  11/23/21 Date:   Date:     Activity/Exercise Parameters Parameters Parameters   Supine AP 20x B     Supine heel slides 10x B AAL     Supine SAQ 10x B      Supine hip abd 10x B AAL                             AFTER TREATMENT POSITION/PRECAUTIONS:  Bed, Needs within reach, RN notified and Visitors at bedside    INTERDISCIPLINARY COLLABORATION:  RN/PCT and PT/PTA    TOTAL TREATMENT DURATION:  PT Patient Time In/Time Out  Time In: 1430  Time Out: 1445    Emely Huff PTA

## 2021-11-29 ENCOUNTER — APPOINTMENT (OUTPATIENT)
Dept: PHYSICAL THERAPY | Age: 86
End: 2021-11-29
Attending: ORTHOPAEDIC SURGERY
Payer: MEDICARE

## 2021-12-06 ENCOUNTER — APPOINTMENT (OUTPATIENT)
Dept: PHYSICAL THERAPY | Age: 86
End: 2021-12-06
Attending: ORTHOPAEDIC SURGERY

## 2021-12-17 NOTE — THERAPY DISCHARGE
Tana Martinez Brace  : 1935  Payor: Cesar Valeriomagdiel / Plan: [address] HMO / Product Type: Managed Care Medicare /  [address] [place] [address]  at [place] [address] Therapy  [address], [address]  [phone]   [phone]         OUTPATIENT PHYSICAL THERAPY:Discontinuation Summary 3/29/2021   ICD-10: Treatment Diagnosis: Chronic right shoulder pain [M25.511, G89.29]  Closed fracture of proximal end of right humerus, unspecified fracture morphology, sequela [S42.201S]  Precautions/Allergies:   Aricept [donepezil]   TREATMENT PLAN:  Effective Dates: 3/29/2021 TO 2021 (90 days). Frequency/Duration: 1 time a week for 90 Day(s) and upon reassessment, will adjust frequency and duration as progress indicates. MEDICAL/REFERRING DIAGNOSIS:  Chronic right shoulder pain [M25.511, G89.29]  Closed fracture of proximal end of right humerus, unspecified fracture morphology, sequela [S42.201S]   DATE OF ONSET: fall/zklbyneb19/12/2020  REFERRING PHYSICIAN: Red Martinez MD MD Orders: Eval and treat  Return MD Appointment:  As needed     INITIAL ASSESSMENT:  Ms. Zaki Harkins presents with decreased ROM and strength in R UE due to fall leading to proximal humerus fracture. She is accompanied to therapy by her daughter who is her primary caregiver. Pt has dementia and has increased difficulty remembering things. She can not recall her fall. She has mild pain with available endrange of motion. She will benefit from therapy to address her ROM and strength losses in her dominant  UE. Discontinuation Assessment:  Consuelo Jensen was  seen in physical therapy from 3/29/2021 to 5/3/2021 for 6 visits. Treatment has been discontinued at this time due to patient failing to return for additional treatment. During the course of treatment, pt was able to lift the arm slightly higher, gaining ROM and strength. The below goals were met prior to discontinuation.     Thank you for this referral. PROBLEM LIST (Impacting functional limitations):  1. Decreased Strength  2. Decreased ADL/Functional Activities  3. Increased Pain  4. Decreased Activity Tolerance INTERVENTIONS PLANNED: (Treatment may consist of any combination of the following)  1. Home Exercise Program (HEP)  2. Manual Therapy  3. Range of Motion (ROM)  4. Therapeutic Exercise/Strengthening     GOALS: (Goals have been discussed and agreed upon with patient.)  Short-Term Functional Goals: Time Frame: 6 weeks  1. Establish independent HEP with no cuing.-met with assistance from daughter  2. Pt will be able to gain 5-10 degrees of shoulder motion for overhead reaching.-met  3. Pt will be able to increase strength by 1/2 grade for lifting purposes and sustained overhead tasks like with bathing and grooming.-in progress  Discharge Goals: Time Frame: 12 weeks (90 days)  1. Pt will be able to improve score on Andrew index by at least 5 points for advanced ADL's.-in progress  2. Pt will be able to perform her normal ADL's without assistance.-in progress  3. Pt will be able to lift 2-5 pound objects without dfficulty.-in progress    OUTCOME MEASURE:    OUTCOME: ANDREW Shoulder Score  Initial Score: 49/93 Most Recent: X/100 (Date: XX/XX)   Interpretation of Score: 20 questions each scored on a 3 point scale with 0 representing \"extreme difficulty or unable to perform\" and 3 representing \"no difficulty\", 3 questions each scored from 0-10 about pain, and 1 question scored 0-10 about function of the shoulder  The lower the score, the greater the functional disability. 100/100 represents no disability, pain or loss of function. Minimal clinically important difference is 11.4. Minimal detectable change is 12.1. Rehabilitation Potential For Stated Goals: [facility] therapy, I certify that the treatment plan above will be carried out by a therapist or under their direction.   Thank you for this referral,  Wali Cobb, PT

## 2022-03-18 PROBLEM — R41.3 MEMORY DIFFICULTIES: Status: ACTIVE | Noted: 2020-11-02

## 2022-03-19 PROBLEM — E11.9 TYPE 2 DIABETES MELLITUS WITHOUT COMPLICATION, WITHOUT LONG-TERM CURRENT USE OF INSULIN (HCC): Status: ACTIVE | Noted: 2018-03-05

## 2022-03-19 PROBLEM — E87.6 HYPOKALEMIA: Status: ACTIVE | Noted: 2021-11-17

## 2022-03-19 PROBLEM — H91.93 BILATERAL HEARING LOSS: Status: ACTIVE | Noted: 2018-03-05

## 2022-03-19 PROBLEM — I10 ESSENTIAL HYPERTENSION: Status: ACTIVE | Noted: 2018-03-05

## 2022-03-19 PROBLEM — F41.9 ANXIETY: Status: ACTIVE | Noted: 2020-12-07

## 2022-03-19 PROBLEM — J30.1 SEASONAL ALLERGIC RHINITIS DUE TO POLLEN: Status: ACTIVE | Noted: 2020-09-21

## 2022-03-19 PROBLEM — S72.142A INTERTROCHANTERIC FRACTURE OF LEFT FEMUR (HCC): Status: ACTIVE | Noted: 2021-11-17

## 2022-03-19 PROBLEM — R42 VERTIGO: Status: ACTIVE | Noted: 2018-03-05

## 2022-05-31 RX ORDER — MEMANTINE HYDROCHLORIDE 5 MG/1
TABLET ORAL
Qty: 31 TABLET | Refills: 11 | OUTPATIENT
Start: 2022-05-31

## 2022-09-04 ENCOUNTER — HOSPITAL ENCOUNTER (EMERGENCY)
Dept: GENERAL RADIOLOGY | Age: 87
Discharge: HOME | End: 2022-09-07
Payer: MEDICARE

## 2022-09-04 ENCOUNTER — HOSPITAL ENCOUNTER (OUTPATIENT)
Age: 87
Setting detail: OBSERVATION
Discharge: NURSING, REHAB OR LONG-TERM CARE | End: 2022-09-06
Attending: EMERGENCY MEDICINE | Admitting: INTERNAL MEDICINE
Payer: MEDICARE

## 2022-09-04 ENCOUNTER — APPOINTMENT (OUTPATIENT)
Dept: GENERAL RADIOLOGY | Age: 87
End: 2022-09-04
Payer: MEDICARE

## 2022-09-04 DIAGNOSIS — J96.01 ACUTE RESPIRATORY FAILURE WITH HYPOXIA (HCC): Primary | ICD-10-CM

## 2022-09-04 DIAGNOSIS — R05.9 COUGH: ICD-10-CM

## 2022-09-04 PROBLEM — E87.6 HYPOKALEMIA: Status: RESOLVED | Noted: 2021-11-17 | Resolved: 2022-09-04

## 2022-09-04 PROBLEM — J30.1 SEASONAL ALLERGIC RHINITIS DUE TO POLLEN: Status: RESOLVED | Noted: 2020-09-21 | Resolved: 2022-09-04

## 2022-09-04 PROBLEM — I10 ESSENTIAL HYPERTENSION: Status: ACTIVE | Noted: 2018-03-05

## 2022-09-04 PROBLEM — E11.9 TYPE 2 DIABETES MELLITUS WITHOUT COMPLICATION, WITHOUT LONG-TERM CURRENT USE OF INSULIN (HCC): Status: ACTIVE | Noted: 2018-03-05

## 2022-09-04 PROBLEM — S72.142A INTERTROCHANTERIC FRACTURE OF LEFT FEMUR (HCC): Status: RESOLVED | Noted: 2021-11-17 | Resolved: 2022-09-04

## 2022-09-04 PROBLEM — R42 VERTIGO: Status: RESOLVED | Noted: 2018-03-05 | Resolved: 2022-09-04

## 2022-09-04 PROBLEM — E11.9 TYPE 2 DIABETES MELLITUS WITHOUT COMPLICATION, WITHOUT LONG-TERM CURRENT USE OF INSULIN (HCC): Chronic | Status: ACTIVE | Noted: 2018-03-05

## 2022-09-04 PROBLEM — I10 ESSENTIAL HYPERTENSION: Chronic | Status: ACTIVE | Noted: 2018-03-05

## 2022-09-04 PROBLEM — R09.02 HYPOXIA: Status: ACTIVE | Noted: 2022-09-04

## 2022-09-04 LAB
ALBUMIN SERPL-MCNC: 3 G/DL (ref 3.2–4.6)
ALBUMIN/GLOB SERPL: 0.7 {RATIO} (ref 1.2–3.5)
ALP SERPL-CCNC: 67 U/L (ref 50–136)
ALT SERPL-CCNC: 26 U/L (ref 12–65)
ANION GAP SERPL CALC-SCNC: 5 MMOL/L (ref 4–13)
AST SERPL-CCNC: 70 U/L (ref 15–37)
BASOPHILS # BLD: 0 K/UL (ref 0–0.2)
BASOPHILS NFR BLD: 0 % (ref 0–2)
BILIRUB SERPL-MCNC: 0.3 MG/DL (ref 0.2–1.1)
BUN SERPL-MCNC: 20 MG/DL (ref 8–23)
CALCIUM SERPL-MCNC: 8.8 MG/DL (ref 8.3–10.4)
CHLORIDE SERPL-SCNC: 103 MMOL/L (ref 101–110)
CO2 SERPL-SCNC: 29 MMOL/L (ref 21–32)
CREAT SERPL-MCNC: 1.13 MG/DL (ref 0.6–1)
DIFFERENTIAL METHOD BLD: ABNORMAL
EKG ATRIAL RATE: 98 BPM
EKG DIAGNOSIS: NORMAL
EKG P AXIS: 74 DEGREES
EKG P-R INTERVAL: 156 MS
EKG Q-T INTERVAL: 362 MS
EKG QRS DURATION: 84 MS
EKG QTC CALCULATION (BAZETT): 462 MS
EKG R AXIS: 34 DEGREES
EKG T AXIS: 98 DEGREES
EKG VENTRICULAR RATE: 98 BPM
EOSINOPHIL # BLD: 0.1 K/UL (ref 0–0.8)
EOSINOPHIL NFR BLD: 3 % (ref 0.5–7.8)
ERYTHROCYTE [DISTWIDTH] IN BLOOD BY AUTOMATED COUNT: 13.2 % (ref 11.9–14.6)
GLOBULIN SER CALC-MCNC: 4.1 G/DL (ref 2.3–3.5)
GLUCOSE SERPL-MCNC: 142 MG/DL (ref 65–100)
HCT VFR BLD AUTO: 34.8 % (ref 35.8–46.3)
HGB BLD-MCNC: 11.7 G/DL (ref 11.7–15.4)
IMM GRANULOCYTES # BLD AUTO: 0 K/UL (ref 0–0.5)
IMM GRANULOCYTES NFR BLD AUTO: 0 % (ref 0–5)
LACTATE SERPL-SCNC: 1.1 MMOL/L (ref 0.4–2)
LYMPHOCYTES # BLD: 1.5 K/UL (ref 0.5–4.6)
LYMPHOCYTES NFR BLD: 43 % (ref 13–44)
MCH RBC QN AUTO: 30.9 PG (ref 26.1–32.9)
MCHC RBC AUTO-ENTMCNC: 33.6 G/DL (ref 31.4–35)
MCV RBC AUTO: 91.8 FL (ref 79.6–97.8)
MONOCYTES # BLD: 0.5 K/UL (ref 0.1–1.3)
MONOCYTES NFR BLD: 15 % (ref 4–12)
NEUTS SEG # BLD: 1.3 K/UL (ref 1.7–8.2)
NEUTS SEG NFR BLD: 38 % (ref 43–78)
NRBC # BLD: 0 K/UL (ref 0–0.2)
NT PRO BNP: 129 PG/ML
PLATELET # BLD AUTO: 159 K/UL (ref 150–450)
PMV BLD AUTO: 9.8 FL (ref 9.4–12.3)
POTASSIUM SERPL-SCNC: 4.5 MMOL/L (ref 3.5–5.1)
PROT SERPL-MCNC: 7.1 G/DL (ref 6.3–8.2)
RBC # BLD AUTO: 3.79 M/UL (ref 4.05–5.2)
SARS-COV-2 RDRP RESP QL NAA+PROBE: NOT DETECTED
SODIUM SERPL-SCNC: 137 MMOL/L (ref 136–145)
SOURCE: NORMAL
TROPONIN I SERPL HS-MCNC: 21.5 PG/ML (ref 0–14)
WBC # BLD AUTO: 3.5 K/UL (ref 4.3–11.1)

## 2022-09-04 PROCEDURE — 6370000000 HC RX 637 (ALT 250 FOR IP): Performed by: EMERGENCY MEDICINE

## 2022-09-04 PROCEDURE — 84484 ASSAY OF TROPONIN QUANT: CPT

## 2022-09-04 PROCEDURE — G0378 HOSPITAL OBSERVATION PER HR: HCPCS

## 2022-09-04 PROCEDURE — 83880 ASSAY OF NATRIURETIC PEPTIDE: CPT

## 2022-09-04 PROCEDURE — 83605 ASSAY OF LACTIC ACID: CPT

## 2022-09-04 PROCEDURE — 94664 DEMO&/EVAL PT USE INHALER: CPT

## 2022-09-04 PROCEDURE — 87635 SARS-COV-2 COVID-19 AMP PRB: CPT

## 2022-09-04 PROCEDURE — 72170 X-RAY EXAM OF PELVIS: CPT

## 2022-09-04 PROCEDURE — 93005 ELECTROCARDIOGRAM TRACING: CPT | Performed by: EMERGENCY MEDICINE

## 2022-09-04 PROCEDURE — 99285 EMERGENCY DEPT VISIT HI MDM: CPT

## 2022-09-04 PROCEDURE — 80053 COMPREHEN METABOLIC PANEL: CPT

## 2022-09-04 PROCEDURE — 85025 COMPLETE CBC W/AUTO DIFF WBC: CPT

## 2022-09-04 PROCEDURE — 6360000002 HC RX W HCPCS: Performed by: EMERGENCY MEDICINE

## 2022-09-04 PROCEDURE — 71045 X-RAY EXAM CHEST 1 VIEW: CPT

## 2022-09-04 RX ORDER — ACETAMINOPHEN 500 MG
500 TABLET ORAL EVERY 6 HOURS PRN
COMMUNITY

## 2022-09-04 RX ORDER — BENZONATATE 100 MG/1
200 CAPSULE ORAL
Status: COMPLETED | OUTPATIENT
Start: 2022-09-04 | End: 2022-09-04

## 2022-09-04 RX ORDER — IPRATROPIUM BROMIDE AND ALBUTEROL SULFATE 2.5; .5 MG/3ML; MG/3ML
1 SOLUTION RESPIRATORY (INHALATION)
Status: COMPLETED | OUTPATIENT
Start: 2022-09-04 | End: 2022-09-04

## 2022-09-04 RX ORDER — ACETAMINOPHEN 160 MG
TABLET,DISINTEGRATING ORAL
COMMUNITY

## 2022-09-04 RX ORDER — ALBUTEROL SULFATE 2.5 MG/3ML
2.5 SOLUTION RESPIRATORY (INHALATION)
Status: COMPLETED | OUTPATIENT
Start: 2022-09-04 | End: 2022-09-04

## 2022-09-04 RX ADMIN — ALBUTEROL SULFATE 2.5 MG: 2.5 SOLUTION RESPIRATORY (INHALATION) at 23:24

## 2022-09-04 RX ADMIN — IPRATROPIUM BROMIDE AND ALBUTEROL SULFATE 1 AMPULE: .5; 3 SOLUTION RESPIRATORY (INHALATION) at 20:33

## 2022-09-04 RX ADMIN — BENZONATATE 200 MG: 100 CAPSULE ORAL at 20:53

## 2022-09-04 ASSESSMENT — PAIN - FUNCTIONAL ASSESSMENT: PAIN_FUNCTIONAL_ASSESSMENT: NONE - DENIES PAIN

## 2022-09-04 ASSESSMENT — PULMONARY FUNCTION TESTS: PEFR_L/MIN: 89

## 2022-09-04 NOTE — ED TRIAGE NOTES
Pt daughter states pt started with a cough yesterday and O2 at of 90% today. Daughter states pt lives in [facility] facility. Daughter states pt had COVID on 8/10. Pt in wheelchair from lobby to triage.

## 2022-09-05 PROBLEM — N17.9 AKI (ACUTE KIDNEY INJURY) (HCC): Status: ACTIVE | Noted: 2022-09-05

## 2022-09-05 PROBLEM — J96.01 ACUTE RESPIRATORY FAILURE WITH HYPOXIA (HCC): Status: ACTIVE | Noted: 2022-09-05

## 2022-09-05 PROBLEM — E87.6 HYPOKALEMIA: Status: ACTIVE | Noted: 2022-09-05

## 2022-09-05 LAB
ANION GAP SERPL CALC-SCNC: 2 MMOL/L (ref 4–13)
ANION GAP SERPL CALC-SCNC: 4 MMOL/L (ref 4–13)
ANION GAP SERPL CALC-SCNC: 5 MMOL/L (ref 4–13)
BASOPHILS # BLD: 0 K/UL (ref 0–0.2)
BASOPHILS NFR BLD: 0 % (ref 0–2)
BUN SERPL-MCNC: 18 MG/DL (ref 8–23)
BUN SERPL-MCNC: 18 MG/DL (ref 8–23)
BUN SERPL-MCNC: 20 MG/DL (ref 8–23)
CALCIUM SERPL-MCNC: 8.3 MG/DL (ref 8.3–10.4)
CALCIUM SERPL-MCNC: 8.3 MG/DL (ref 8.3–10.4)
CALCIUM SERPL-MCNC: 8.5 MG/DL (ref 8.3–10.4)
CHLORIDE SERPL-SCNC: 105 MMOL/L (ref 101–110)
CHLORIDE SERPL-SCNC: 107 MMOL/L (ref 101–110)
CHLORIDE SERPL-SCNC: 107 MMOL/L (ref 101–110)
CO2 SERPL-SCNC: 28 MMOL/L (ref 21–32)
CO2 SERPL-SCNC: 30 MMOL/L (ref 21–32)
CO2 SERPL-SCNC: 31 MMOL/L (ref 21–32)
CREAT SERPL-MCNC: 0.9 MG/DL (ref 0.6–1)
CREAT SERPL-MCNC: 1.02 MG/DL (ref 0.6–1)
CREAT SERPL-MCNC: 1.06 MG/DL (ref 0.6–1)
DIFFERENTIAL METHOD BLD: ABNORMAL
EOSINOPHIL # BLD: 0 K/UL (ref 0–0.8)
EOSINOPHIL NFR BLD: 1 % (ref 0.5–7.8)
ERYTHROCYTE [DISTWIDTH] IN BLOOD BY AUTOMATED COUNT: 13.2 % (ref 11.9–14.6)
EST. AVERAGE GLUCOSE BLD GHB EST-MCNC: 148 MG/DL
GLUCOSE BLD STRIP.AUTO-MCNC: 125 MG/DL (ref 65–100)
GLUCOSE BLD STRIP.AUTO-MCNC: 171 MG/DL (ref 65–100)
GLUCOSE BLD STRIP.AUTO-MCNC: 177 MG/DL (ref 65–100)
GLUCOSE BLD STRIP.AUTO-MCNC: 212 MG/DL (ref 65–100)
GLUCOSE BLD STRIP.AUTO-MCNC: 261 MG/DL (ref 65–100)
GLUCOSE SERPL-MCNC: 123 MG/DL (ref 65–100)
GLUCOSE SERPL-MCNC: 176 MG/DL (ref 65–100)
GLUCOSE SERPL-MCNC: 210 MG/DL (ref 65–100)
HBA1C MFR BLD: 6.8 % (ref 4.8–5.6)
HCT VFR BLD AUTO: 34.4 % (ref 35.8–46.3)
HGB BLD-MCNC: 11.3 G/DL (ref 11.7–15.4)
IMM GRANULOCYTES # BLD AUTO: 0 K/UL (ref 0–0.5)
IMM GRANULOCYTES NFR BLD AUTO: 0 % (ref 0–5)
LACTATE SERPL-SCNC: 1.5 MMOL/L (ref 0.4–2)
LYMPHOCYTES # BLD: 0.8 K/UL (ref 0.5–4.6)
LYMPHOCYTES NFR BLD: 23 % (ref 13–44)
MAGNESIUM SERPL-MCNC: 1.7 MG/DL (ref 1.8–2.4)
MCH RBC QN AUTO: 30.6 PG (ref 26.1–32.9)
MCHC RBC AUTO-ENTMCNC: 32.8 G/DL (ref 31.4–35)
MCV RBC AUTO: 93.2 FL (ref 79.6–97.8)
MONOCYTES # BLD: 0.5 K/UL (ref 0.1–1.3)
MONOCYTES NFR BLD: 15 % (ref 4–12)
NEUTS SEG # BLD: 2.1 K/UL (ref 1.7–8.2)
NEUTS SEG NFR BLD: 61 % (ref 43–78)
NRBC # BLD: 0 K/UL (ref 0–0.2)
PLATELET # BLD AUTO: 170 K/UL (ref 150–450)
PMV BLD AUTO: 10.3 FL (ref 9.4–12.3)
POTASSIUM SERPL-SCNC: 2.7 MMOL/L (ref 3.5–5.1)
POTASSIUM SERPL-SCNC: 2.8 MMOL/L (ref 3.5–5.1)
POTASSIUM SERPL-SCNC: 4.2 MMOL/L (ref 3.5–5.1)
RBC # BLD AUTO: 3.69 M/UL (ref 4.05–5.2)
SERVICE CMNT-IMP: ABNORMAL
SODIUM SERPL-SCNC: 139 MMOL/L (ref 136–145)
SODIUM SERPL-SCNC: 140 MMOL/L (ref 136–145)
SODIUM SERPL-SCNC: 140 MMOL/L (ref 136–145)
WBC # BLD AUTO: 3.4 K/UL (ref 4.3–11.1)

## 2022-09-05 PROCEDURE — 94760 N-INVAS EAR/PLS OXIMETRY 1: CPT

## 2022-09-05 PROCEDURE — 96375 TX/PRO/DX INJ NEW DRUG ADDON: CPT

## 2022-09-05 PROCEDURE — 83605 ASSAY OF LACTIC ACID: CPT

## 2022-09-05 PROCEDURE — 83735 ASSAY OF MAGNESIUM: CPT

## 2022-09-05 PROCEDURE — 96361 HYDRATE IV INFUSION ADD-ON: CPT

## 2022-09-05 PROCEDURE — 2580000003 HC RX 258: Performed by: FAMILY MEDICINE

## 2022-09-05 PROCEDURE — 96365 THER/PROPH/DIAG IV INF INIT: CPT

## 2022-09-05 PROCEDURE — 80048 BASIC METABOLIC PNL TOTAL CA: CPT

## 2022-09-05 PROCEDURE — 96366 THER/PROPH/DIAG IV INF ADDON: CPT

## 2022-09-05 PROCEDURE — 2700000000 HC OXYGEN THERAPY PER DAY

## 2022-09-05 PROCEDURE — 85025 COMPLETE CBC W/AUTO DIFF WBC: CPT

## 2022-09-05 PROCEDURE — G0378 HOSPITAL OBSERVATION PER HR: HCPCS

## 2022-09-05 PROCEDURE — 6360000002 HC RX W HCPCS: Performed by: FAMILY MEDICINE

## 2022-09-05 PROCEDURE — 6370000000 HC RX 637 (ALT 250 FOR IP): Performed by: FAMILY MEDICINE

## 2022-09-05 PROCEDURE — 6360000002 HC RX W HCPCS: Performed by: INTERNAL MEDICINE

## 2022-09-05 PROCEDURE — 94761 N-INVAS EAR/PLS OXIMETRY MLT: CPT

## 2022-09-05 PROCEDURE — 2580000003 HC RX 258: Performed by: INTERNAL MEDICINE

## 2022-09-05 PROCEDURE — 6370000000 HC RX 637 (ALT 250 FOR IP): Performed by: INTERNAL MEDICINE

## 2022-09-05 PROCEDURE — 96372 THER/PROPH/DIAG INJ SC/IM: CPT

## 2022-09-05 PROCEDURE — 83036 HEMOGLOBIN GLYCOSYLATED A1C: CPT

## 2022-09-05 PROCEDURE — 82962 GLUCOSE BLOOD TEST: CPT

## 2022-09-05 RX ORDER — POTASSIUM CHLORIDE 7.45 MG/ML
10 INJECTION INTRAVENOUS PRN
Status: DISCONTINUED | OUTPATIENT
Start: 2022-09-05 | End: 2022-09-05

## 2022-09-05 RX ORDER — POTASSIUM CHLORIDE 20 MEQ/1
40 TABLET, EXTENDED RELEASE ORAL PRN
Status: DISCONTINUED | OUTPATIENT
Start: 2022-09-05 | End: 2022-09-05

## 2022-09-05 RX ORDER — POTASSIUM CHLORIDE 20 MEQ/1
40 TABLET, EXTENDED RELEASE ORAL ONCE
Status: COMPLETED | OUTPATIENT
Start: 2022-09-05 | End: 2022-09-05

## 2022-09-05 RX ORDER — GUAIFENESIN/DEXTROMETHORPHAN 100-10MG/5
5 SYRUP ORAL EVERY 4 HOURS PRN
Status: DISCONTINUED | OUTPATIENT
Start: 2022-09-05 | End: 2022-09-06 | Stop reason: HOSPADM

## 2022-09-05 RX ORDER — ONDANSETRON 2 MG/ML
4 INJECTION INTRAMUSCULAR; INTRAVENOUS EVERY 6 HOURS PRN
Status: DISCONTINUED | OUTPATIENT
Start: 2022-09-05 | End: 2022-09-06 | Stop reason: HOSPADM

## 2022-09-05 RX ORDER — SODIUM CHLORIDE 0.9 % (FLUSH) 0.9 %
5-40 SYRINGE (ML) INJECTION PRN
Status: DISCONTINUED | OUTPATIENT
Start: 2022-09-05 | End: 2022-09-06 | Stop reason: HOSPADM

## 2022-09-05 RX ORDER — SODIUM CHLORIDE 9 MG/ML
INJECTION, SOLUTION INTRAVENOUS CONTINUOUS
Status: DISCONTINUED | OUTPATIENT
Start: 2022-09-05 | End: 2022-09-06 | Stop reason: HOSPADM

## 2022-09-05 RX ORDER — DEXTROSE MONOHYDRATE 100 MG/ML
INJECTION, SOLUTION INTRAVENOUS CONTINUOUS PRN
Status: DISCONTINUED | OUTPATIENT
Start: 2022-09-05 | End: 2022-09-06 | Stop reason: HOSPADM

## 2022-09-05 RX ORDER — MAGNESIUM SULFATE IN WATER 40 MG/ML
2000 INJECTION, SOLUTION INTRAVENOUS ONCE
Status: COMPLETED | OUTPATIENT
Start: 2022-09-05 | End: 2022-09-05

## 2022-09-05 RX ORDER — ACETAMINOPHEN 650 MG/1
650 SUPPOSITORY RECTAL EVERY 6 HOURS PRN
Status: DISCONTINUED | OUTPATIENT
Start: 2022-09-05 | End: 2022-09-06 | Stop reason: HOSPADM

## 2022-09-05 RX ORDER — POLYETHYLENE GLYCOL 3350 17 G/17G
17 POWDER, FOR SOLUTION ORAL DAILY
Status: DISCONTINUED | OUTPATIENT
Start: 2022-09-05 | End: 2022-09-06 | Stop reason: HOSPADM

## 2022-09-05 RX ORDER — PROMETHAZINE HYDROCHLORIDE 12.5 MG/1
12.5 TABLET ORAL EVERY 6 HOURS PRN
Status: DISCONTINUED | OUTPATIENT
Start: 2022-09-05 | End: 2022-09-06 | Stop reason: HOSPADM

## 2022-09-05 RX ORDER — INSULIN LISPRO 100 [IU]/ML
0-8 INJECTION, SOLUTION INTRAVENOUS; SUBCUTANEOUS
Status: DISCONTINUED | OUTPATIENT
Start: 2022-09-05 | End: 2022-09-06 | Stop reason: HOSPADM

## 2022-09-05 RX ORDER — HEPARIN SODIUM 5000 [USP'U]/ML
5000 INJECTION, SOLUTION INTRAVENOUS; SUBCUTANEOUS EVERY 8 HOURS SCHEDULED
Status: DISCONTINUED | OUTPATIENT
Start: 2022-09-05 | End: 2022-09-06 | Stop reason: HOSPADM

## 2022-09-05 RX ORDER — FUROSEMIDE 20 MG/1
20 TABLET ORAL DAILY
Status: DISCONTINUED | OUTPATIENT
Start: 2022-09-06 | End: 2022-09-06 | Stop reason: HOSPADM

## 2022-09-05 RX ORDER — ENOXAPARIN SODIUM 100 MG/ML
40 INJECTION SUBCUTANEOUS DAILY
Status: DISCONTINUED | OUTPATIENT
Start: 2022-09-05 | End: 2022-09-05 | Stop reason: ALTCHOICE

## 2022-09-05 RX ORDER — SODIUM CHLORIDE 9 MG/ML
INJECTION, SOLUTION INTRAVENOUS PRN
Status: DISCONTINUED | OUTPATIENT
Start: 2022-09-05 | End: 2022-09-06 | Stop reason: HOSPADM

## 2022-09-05 RX ORDER — HYDROCODONE BITARTRATE AND HOMATROPINE METHYLBROMIDE ORAL SOLUTION 5; 1.5 MG/5ML; MG/5ML
5 LIQUID ORAL EVERY 4 HOURS PRN
Status: DISCONTINUED | OUTPATIENT
Start: 2022-09-05 | End: 2022-09-06 | Stop reason: HOSPADM

## 2022-09-05 RX ORDER — INSULIN LISPRO 100 [IU]/ML
0-4 INJECTION, SOLUTION INTRAVENOUS; SUBCUTANEOUS NIGHTLY
Status: DISCONTINUED | OUTPATIENT
Start: 2022-09-05 | End: 2022-09-06 | Stop reason: HOSPADM

## 2022-09-05 RX ORDER — POTASSIUM CHLORIDE 750 MG/1
10 CAPSULE, EXTENDED RELEASE ORAL DAILY
COMMUNITY

## 2022-09-05 RX ORDER — MAGNESIUM HYDROXIDE/ALUMINUM HYDROXICE/SIMETHICONE 120; 1200; 1200 MG/30ML; MG/30ML; MG/30ML
30 SUSPENSION ORAL EVERY 6 HOURS PRN
Status: DISCONTINUED | OUTPATIENT
Start: 2022-09-05 | End: 2022-09-06 | Stop reason: HOSPADM

## 2022-09-05 RX ORDER — MAGNESIUM SULFATE IN WATER 40 MG/ML
2000 INJECTION, SOLUTION INTRAVENOUS PRN
Status: DISCONTINUED | OUTPATIENT
Start: 2022-09-05 | End: 2022-09-06 | Stop reason: HOSPADM

## 2022-09-05 RX ORDER — SODIUM CHLORIDE 0.9 % (FLUSH) 0.9 %
5-40 SYRINGE (ML) INJECTION EVERY 12 HOURS SCHEDULED
Status: DISCONTINUED | OUTPATIENT
Start: 2022-09-05 | End: 2022-09-06 | Stop reason: HOSPADM

## 2022-09-05 RX ORDER — SODIUM CHLORIDE 9 MG/ML
INJECTION, SOLUTION INTRAVENOUS CONTINUOUS
Status: DISCONTINUED | OUTPATIENT
Start: 2022-09-05 | End: 2022-09-05

## 2022-09-05 RX ORDER — FUROSEMIDE 20 MG/1
20 TABLET ORAL DAILY
COMMUNITY

## 2022-09-05 RX ORDER — METHYLPREDNISOLONE SODIUM SUCCINATE 40 MG/ML
40 INJECTION, POWDER, LYOPHILIZED, FOR SOLUTION INTRAMUSCULAR; INTRAVENOUS DAILY
Status: DISCONTINUED | OUTPATIENT
Start: 2022-09-05 | End: 2022-09-06

## 2022-09-05 RX ORDER — ACETAMINOPHEN 325 MG/1
650 TABLET ORAL EVERY 6 HOURS PRN
Status: DISCONTINUED | OUTPATIENT
Start: 2022-09-05 | End: 2022-09-06 | Stop reason: HOSPADM

## 2022-09-05 RX ADMIN — HYDROCODONE BITARTRATE AND HOMATROPINE METHYLBROMIDE 5 ML: 5; 1.5 SOLUTION ORAL at 00:39

## 2022-09-05 RX ADMIN — HEPARIN SODIUM 5000 UNITS: 5000 INJECTION INTRAVENOUS; SUBCUTANEOUS at 21:15

## 2022-09-05 RX ADMIN — SODIUM CHLORIDE: 9 INJECTION, SOLUTION INTRAVENOUS at 17:39

## 2022-09-05 RX ADMIN — HEPARIN SODIUM 5000 UNITS: 5000 INJECTION INTRAVENOUS; SUBCUTANEOUS at 14:42

## 2022-09-05 RX ADMIN — SODIUM CHLORIDE: 9 INJECTION, SOLUTION INTRAVENOUS at 00:39

## 2022-09-05 RX ADMIN — INSULIN LISPRO 2 UNITS: 100 INJECTION, SOLUTION INTRAVENOUS; SUBCUTANEOUS at 16:57

## 2022-09-05 RX ADMIN — POTASSIUM CHLORIDE 40 MEQ: 1500 TABLET, EXTENDED RELEASE ORAL at 11:20

## 2022-09-05 RX ADMIN — POTASSIUM CHLORIDE 40 MEQ: 1500 TABLET, EXTENDED RELEASE ORAL at 09:16

## 2022-09-05 RX ADMIN — SODIUM CHLORIDE, PRESERVATIVE FREE 10 ML: 5 INJECTION INTRAVENOUS at 09:16

## 2022-09-05 RX ADMIN — GUAIFENESIN AND DEXTROMETHORPHAN 5 ML: 100; 10 SYRUP ORAL at 11:20

## 2022-09-05 RX ADMIN — SODIUM CHLORIDE, PRESERVATIVE FREE 10 ML: 5 INJECTION INTRAVENOUS at 21:15

## 2022-09-05 RX ADMIN — MAGNESIUM SULFATE HEPTAHYDRATE 2000 MG: 40 INJECTION, SOLUTION INTRAVENOUS at 09:16

## 2022-09-05 RX ADMIN — METHYLPREDNISOLONE SODIUM SUCCINATE 40 MG: 40 INJECTION, POWDER, FOR SOLUTION INTRAMUSCULAR; INTRAVENOUS at 11:17

## 2022-09-05 NOTE — ED NOTES
Pt ambulated for 2 minutes in room while on room air SpO2 was at Cornelius Gottlieb RN  09/04/22 2026

## 2022-09-05 NOTE — CARE COORDINATION
Medical record reviewed. Pt is an 81 y/o female admitted for cough, hypoxia, and acute respiratory failure. CM met with patient at bedside to introduce self and explain role in planning. Pt is extremely [place] and did not have her hearing aids in place. She also has a history of dementia and was confused about where she lives. Pt in agreement for CM to follow up with her dtr to discuss discharge planning. CM spoke with dtr, Mere Jett. She reported that patient has lived at [place] [address] since August 2021. Prior to that, pt was living with her dtr. At baseline, pt ambulates with a rolling walker, although she requires assistance. She has the following DME at the [facility]: wheelchair, rollator, elevated toilet seat, and a shower chair. Pt requires assistance with ADL's. Meals are provided at the facility and staff distributes her medications. Patient has a history of dementia and is oriented to self, family, and situation. Dtr plans for patient to return to [facility] at time of discharge. Pt will likely need home oxygen and dtr reported that [facility] can likely assist with management of O2 since she has seen other residents there with O2. Walk test is pending. Dtr has no preference in providers. Pt does not have a qualifying diagnosis, but had covid in August 2022. CM will follow up with DME company to confirm this as a qualifying diagnosis. Pt is not medically stable today due to low potassium and wheezing. She will likely be ready for discharge tomorrow. Dtr plans to transport in a private vehicle. 09/05/22 1052   Service Assessment   Patient Orientation Alert and Oriented;Person;Situation   Cognition Dementia / Early Alzheimer's   History Provided By Child/Family   Primary Caregiver Other (Comment)  (pt resides at [facility])   [facility]   PCP Verified by CM Yes   Prior Functional Level Assistance with the following:;Bathing;Cooking;Housework; Shopping;Mobility;Dressing   Current Functional Level Assistance with the following:;Bathing;Dressing;Cooking;Housework; Shopping;Mobility   Can patient return to prior living arrangement Yes   Ability to make needs known: Fair   Family able to assist with home care needs: Yes   Community Resources Assisted Living   CM/SW Referral DME   Social/Functional History   Lives With Other (comment)  (ALETA)   Type of Home Assisted living   Home Layout One level   Home Access Level entry   [address]  bars;Rollator;Walker, rolling; [address] Help From Other (comment)   ADL Assistance Needs assistance   Ambulation Assistance Needs assistance   Transfer Assistance Needs assistance   Active  No   Occupation Retired   Discharge Planning   Type of [place] Other (Comment)   Current Services Prior To Admission [facility]   DME Oxygen Therapy (Comment)   [facility]   Type of [address] None   Patient expects to be discharged to: Assisted living   [address] Discharge   Transition of [address] (CM Consult) [address] Discharge Assisted living   [address] Provided? No   Mode of Transport at Discharge Other (see comment)   Confirm Follow Up Transport Other (see comment)   Condition of Participation: Discharge Planning   The Plan for Transition of Care is related to the following treatment goals: return to prior level of care   The Patient and/or Patient Representative was provided with a Choice of Provider? Patient Representative   Name of the Patient Representative who was provided with the Choice of Provider and agrees with the Discharge Plan? Abel Sharma   The Patient and/Or Patient Representative agree with the Discharge Plan?  Yes   Freedom of Choice list was provided with basic dialogue that supports the patient's individualized plan of care/goals, treatment preferences, and shares the quality data associated with the providers? Yes     UPDATE 11:24 AM: CM attempted to reach the DON at [facility] ([phone]) to confirm management of oxygen at the [facility]. Was unable to reach DON, but left a message with a request for a return phone call. GREY also spoke with a representative at [facility] ([phone]) to confirm covid history as a qualifying diagnosis. Rep confirmed that this is a qualifying dx.  Awaiting walk test.

## 2022-09-05 NOTE — H&P
Hospitalist History and Physical   Admit Date:  2022  7:16 PM   Name:  Miah Blount   Age:  80 y.o. Sex:  female  :  1935   MRN:  [id number]   Room:  ER    Presenting Complaint: Cough     Reason(s) for Admission: Hypoxia [R09.02]     History of Present Illness:   Patient was discussed with the ER provider prior to seeing the patient. Miah Blount is a 80 y.o. female with medical history of hypertension and diabetes, who presented with cough and low O2 saturation. Patient started having a cough yesterday, and today her O2 saturation was only about 90%. She lives in an assisted living facility. She did have COVID on 8/10/2022. COVID testing today was negative. Initially in the ER, her O2 saturations were 93 to 94% on room air and walking. The ER physician had plan to discharge her, but then her O2 saturations dropped to 87 to 88% on room air. No particular etiology was discovered. She was placed on oxygen at 2 L/min. We were asked to admit for evaluation. She does report a mild to moderate cough, but no other significant symptoms. She denies fever/chills, NVD, abdominal pain, chest pain, shortness of breath. Review of Systems:  10 systems reviewed and negative except as noted in HPI. Assessment & Plan:     Principal Problem:    Hypoxia  Plan: No specific etiology. This could simply be leftover from recent COVID infection. We will supplement with oxygen as needed to keep an O2 saturation greater than 90%  Recheck resting and ambulatory O2 saturation in the morning. If no other symptoms appear, she may simply need supplemental oxygen as an outpatient.   Active Problems:    Essential hypertension  Plan: She is not on any chronic medications and blood pressure readings are acceptable, continue to monitor    Type 2 diabetes mellitus without complication, without long-term current use of insulin ([facility]  Plan: She does not take any chronic diabetic medications, will monitor and cover with sliding scale insulin      Dispo/Discharge Planning:   Observation    Diet: Regular  VTE ppx:  Lovenox  Code status: Full    Hospital Problems             Last Modified POA    * (Principal) Hypoxia 9/4/2022 Yes    Essential hypertension (Chronic) 9/4/2022 Yes    Type 2 diabetes mellitus without complication, without long-term current use of insulin ([facility] (Chronic) 9/4/2022 Yes       Past History:  Past Medical History:   Diagnosis Date    Broken bones     Diabetes ([address]     Ear problems     Hearing problem     History of mammogram 2015    History of Papanicolaou smear of cervix >5 years ago    Hypertension     Seasonal allergic rhinitis due to pollen 9/21/2020     Past Surgical History:   Procedure Laterality Date    COCHLEAR IMPLANT  02/21/2017    [address] [place], [place]    COLONOSCOPY  2008    ORTHOPEDIC SURGERY  2008    lumbar laminectomy; [place], [place]    TUBAL LIGATION        Allergies   Allergen Reactions    Donepezil Itching      Social History     Tobacco Use    Smoking status: Never    Smokeless tobacco: Never   Substance Use Topics    Alcohol use: No      Family History   Problem Relation Age of Onset    Breast Cancer Sister     Heart Disease Maternal Grandmother     Colon Cancer Sister     Stroke Mother       Family history reviewed and negative except as noted above.     Immunization History   Administered Date(s) Administered    Influenza, FLUAD, (age 72 y+), Adjuvanted, 0.5mL 09/21/2020    Influenza, High Dose (Fluzone 65 yrs and older) 11/14/2017, 09/25/2018    PPD Test 11/17/2021    Pneumococcal Conjugate 13-valent ([id number]) 03/05/2018    Pneumococcal Polysaccharide ([id number]) 09/15/2016    Tdap (Boostrix, Adacel) 11/02/2020    Zoster Recombinant (Shingrix) 11/02/2020, 01/13/2021     Prior to Admit Medications:  Current Outpatient Medications   Medication Instructions    acetaminophen (TYLENOL) 500 mg, Oral, EVERY 6 HOURS PRN    amLODIPine (NORVASC) 10 MG tablet 1 tablet, DAILY cetirizine (ZYRTEC) 10 mg, DAILY    Cholecalciferol (VITAMIN D3) 50 MCG (2000 UT) CAPS Oral    D-MANNOSE PO 6,060 mg, DAILY    Flaxseed Oil OIL 1,400 mg, DAILY    hydrOXYzine HCl (ATARAX) 25 mg, Oral, DAILY PRN    memantine (NAMENDA) 5 mg, DAILY    triamcinolone (KENALOG) 0.1 % cream 2 TIMES DAILY         Objective:   Patient Vitals for the past 24 hrs:   Temp Pulse Resp BP SpO2   09/04/22 2246 -- 89 20 (!) 151/66 93 %   09/04/22 2152 -- 84 18 -- 95 %   09/04/22 2151 -- 89 18 -- 95 %   09/04/22 2036 -- 75 16 -- (!) 89 %   09/04/22 2035 -- 74 18 -- 91 %   09/04/22 2018 -- -- 18 -- 94 %   09/04/22 1941 -- -- -- -- 97 %   09/1935 -- 80 -- (!) 149/68 91 %   09/04/22 1913 99.8 °F (37.7 °C) 83 16 132/68 92 %       Estimated body mass index is 23.34 kg/m² as calculated from the following:    Height as of this encounter: 5' 4\" (1.626 m). Weight as of this encounter: 136 lb (61.7 kg). No intake or output data in the 24 hours ending 09/04/22 2304      Physical Exam:    Blood pressure (!) 151/66, pulse 89, temperature 99.8 °F (37.7 °C), temperature source Oral, resp. rate 20, height 5' 4\" (1.626 m), weight 136 lb (61.7 kg), SpO2 93 %. General:    WD and WN, No apparent distress. Eyes:  PERRL; EOMI; sclera normal/non-icteric  HENT:  Normocephalic, without obvious abnormality; Oropharynx is clear with tacky mucous membranes   Neck:  No restricted ROM. Trachea midline   Resp:    Clear to auscultation bilaterally. Resp are even and unlabored  CVS/Heart: Regular rate and rhythm,  No LE edema    GI:    Soft, non-tender. Not distended. Bowel sounds normal.     Musc/SK: Muscle strength is appropriate for age/condition; No cyanosis. No clubbing  Skin:  No rashes and normal coloration. Warm and dry.     Neurologic: CN II - XII are grossly intact - Eye exam as noted above; moves extremities equally  Psych: Alert and oriented x 4;  Judgement and insight are normal    I have reviewed ordered lab tests and independently visualized imaging below:    Last 24hr Labs:  Recent Results (from the past 24 hour(s))   COVID-19, Rapid    Collection Time: 09/04/22  7:34 PM    Specimen: Nasopharyngeal   Result Value Ref Range    Source Nasopharyngeal      SARS-CoV-2, Rapid Not detected NOTD     CBC with Auto Differential    Collection Time: 09/04/22  9:35 PM   Result Value Ref Range    WBC 3.5 (L) 4.3 - 11.1 K/uL    RBC 3.79 (L) 4.05 - 5.2 M/uL    Hemoglobin 11.7 11.7 - 15.4 g/dL    Hematocrit 34.8 (L) 35.8 - 46.3 %    MCV 91.8 79.6 - 97.8 FL    MCH 30.9 26.1 - 32.9 PG    MCHC 33.6 31.4 - 35.0 g/dL    RDW 13.2 11.9 - 14.6 %    Platelets 521 412 - 562 K/uL    MPV 9.8 9.4 - 12.3 FL    nRBC 0.00 0.0 - 0.2 K/uL    Differential Type AUTOMATED      Seg Neutrophils 38 (L) 43 - 78 %    Lymphocytes 43 13 - 44 %    Monocytes 15 (H) 4.0 - 12.0 %    Eosinophils % 3 0.5 - 7.8 %    Basophils 0 0.0 - 2.0 %    Immature Granulocytes 0 0.0 - 5.0 %    Segs Absolute 1.3 (L) 1.7 - 8.2 K/UL    Absolute Lymph # 1.5 0.5 - 4.6 K/UL    Absolute Mono # 0.5 0.1 - 1.3 K/UL    Absolute Eos # 0.1 0.0 - 0.8 K/UL    Basophils Absolute 0.0 0.0 - 0.2 K/UL    Absolute Immature Granulocyte 0.0 0.0 - 0.5 K/UL   Comprehensive Metabolic Panel    Collection Time: 09/04/22  9:35 PM   Result Value Ref Range    Sodium 137 136 - 145 mmol/L    Potassium 4.5 3.5 - 5.1 mmol/L    Chloride 103 101 - 110 mmol/L    CO2 29 21 - 32 mmol/L    Anion Gap 5 4 - 13 mmol/L    Glucose 142 (H) 65 - 100 mg/dL    BUN 20 8 - 23 MG/DL    Creatinine 1.13 (H) 0.6 - 1.0 MG/DL    GFR  59 (L) >60 ml/min/1.73m2    GFR Non- 49 (L) >60 ml/min/1.73m2    Calcium 8.8 8.3 - 10.4 MG/DL    Total Bilirubin 0.3 0.2 - 1.1 MG/DL    ALT 26 12 - 65 U/L    AST 70 (H) 15 - 37 U/L    Alk Phosphatase 67 50 - 136 U/L    Total Protein 7.1 6.3 - 8.2 g/dL    Albumin 3.0 (L) 3.2 - 4.6 g/dL    Globulin 4.1 (H) 2.3 - 3.5 g/dL    Albumin/Globulin Ratio 0.7 (L) 1.2 - 3.5     Lactic Acid    Collection Time: 09/04/22  9:35 PM   Result Value Ref Range    Lactic Acid, Plasma 1.1 0.4 - 2.0 MMOL/L   Troponin    Collection Time: 09/04/22  9:35 PM   Result Value Ref Range    Troponin, High Sensitivity 21.5 (H) 0 - 14 pg/mL   Brain Natriuretic Peptide    Collection Time: 09/04/22  9:35 PM   Result Value Ref Range    NT Pro- <450 PG/ML   EKG 12 Lead    Collection Time: 09/04/22 10:17 PM   Result Value Ref Range    Ventricular Rate 98 BPM    Atrial Rate 98 BPM    P-R Interval 156 ms    QRS Duration 84 ms    Q-T Interval 362 ms    QTc Calculation (Bazett) 462 ms    P Axis 74 degrees    R Axis 34 degrees    T Axis 98 degrees    Diagnosis Normal sinus rhythm          Other Studies:  XR PELVIS (1-2 VIEWS)    Result Date: 9/4/2022  Clinical history: Buttock pain after fall TECHNIQUE: Single AP view of the pelvis. COMPARISON: November 17, 2021. FINDINGS: There are findings of intramedullary nailing of the left femur, partially visualized, similar to prior. There is no evidence of hip dislocation. No evidence of acute pelvic fracture. The SI joints and pubic symphysis are intact. Degenerative changes in the visualized lower lumbar spine. 1. No evidence of acute pelvic fracture. XR CHEST 1 VIEW    Result Date: 9/4/2022  EXAMINATION: XR CHEST 1 VIEW 9/4/2022 7:27 PM ACCESSION NUMBER: [id number] COMPARISON: Chest radiograph of November 17, 2021 INDICATION: cough TECHNIQUE: A single portable AP view of the chest was obtained. FINDINGS: Cardiopericardial silhouette and vascularity within normal limits. No infiltrative opacity, effusion, or pneumothorax. Osseous structures are unremarkable. 1.  No acute process. No results found for this or any previous visit. albuterol  2.5 mg Nebulization NOW         Signed:  Vitor Shell MD    Part of this note may have been written by using a voice dictation software. The note has been proof read but may still contain some grammatical/other typographical errors.

## 2022-09-05 NOTE — ED NOTES
TRANSFER - OUT REPORT:    Verbal report given to [address] [place] [address] on Tere  being transferred to [place] [id number] for routine progression of patient care       Report consisted of patient's Situation, Background, Assessment and   Recommendations(SBAR). Information from the following report(s) ED SBAR was reviewed with the receiving nurse. Lines:   Peripheral IV 09/04/22 Right Antecubital (Active)   Site Assessment Clean, dry & intact 09/04/22 2139   Line Status Blood return noted 09/04/22 2139   Phlebitis Assessment No symptoms 09/04/22 2139   Infiltration Assessment 0 09/04/22 2139   Alcohol Cap Used No 09/04/22 2139   Dressing Status New dressing applied;Clean, dry & intact 09/04/22 2139   Dressing Type Transparent 09/04/22 2139   Dressing Intervention New 09/04/22 2139        Opportunity for questions and clarification was provided.       Patient transported with:  Registered Nurse     Magalys Tate RN  09/04/22 6936

## 2022-09-05 NOTE — PROGRESS NOTES
Hospitalist Progress Note   Admit Date:  2022  7:16 PM   Name:  Polo Toledo   Age:  80 y.o. Sex:  female  :  1935   MRN:  [id number]   Room:  [facility]/01    Presenting Complaint: Cough     Reason(s) for Admission: Cough [R05.9]  Hypoxia [R09.02]  Acute respiratory failure with hypoxia [facility]) [J96.01]     Hospital Course:   Please refer to the admission H&P for details of presentation. In summary, Polo Toledo is a 80 y.o. female with medical history significant for  Hypertension ,diabetes, dementia who presented with cough and hypoxia from NH. Had covid 19 in 8/10 but neg on admission. Initially in the ER, her O2 saturations were 93 to 94% on room air and walking. The ER physician had plan to discharge her, but then her O2 saturations dropped to 87 to 88% on room air    Subjective/24 hr Events (22) : Patient is seen and examined at bedside. No acute events reported overnight by nursing staff. Currently on 2L O2 NC , resting in bed. No acute distress. Very hard of hearing and without her hearing aide. Communicated via writing questions on paper. Patient denies fever, chills, chest pains, shortness of breath, n/v, abdominal pain. Intermittent cough. No sputum. Review of Systems: 10 point review of systems is otherwise negative with the exception of the elements mentioned above. Assessment & Plan:     Acute respiratory failure with hypoxia  Desat down to 87-88% on guillory air in ED per records. Now needing 2L to maintain saturation.  - wheezing on exam so start solumedrol IV  - O2 supplement  - cough suppressant  - 6MWT prior to DC    SAWYER  Cr of 1.13 on admission. Baseline ~0.8  - monitor  - gentle hydration      Hypokalemia  K+ of 2.9  - replete and recheck     T2DM  -start insulin sliding scale  -monitor finger stick glucose 3 times daily and prior to bedtime      Essential hypertension  - on lasix at home. Restart      Diet:  ADULT DIET;  Regular; 4 carb choices (60 gm/meal)  DVT PPx: heparin   Code status: Full Code      I have personally reviewed and ordered clinical lab tests and independently visualized images, tracing. I spent 35 minutes of time caring for this patient at bedside or nearby, and more than 50 percent of which was spent on coordination of care and/or patient/family counseling regarding the disease process, status , and treatment options/plan of care. Hospital Problems:  Principal Problem:    Acute respiratory failure with hypoxia (HCC)  Active Problems:    Hypokalemia    SAWYER (acute kidney injury) ([facility] [address]    Essential hypertension    Type 2 diabetes mellitus without complication, without long-term current use of insulin (HCC)  Resolved Problems:    * No resolved hospital problems. *      Objective:   Patient Vitals for the past 24 hrs:   Temp Pulse Resp BP SpO2   09/05/22 1511 99.7 °F (37.6 °C) 78 18 (!) 146/61 95 %   09/05/22 1121 98.6 °F (37 °C) 80 18 (!) 141/57 97 %   09/05/22 0849 -- 77 -- -- 94 %   09/05/22 0712 99.5 °F (37.5 °C) 63 18 (!) 112/54 96 %   09/05/22 0342 98.4 °F (36.9 °C) 72 18 (!) 127/56 92 %   09/05/22 0045 -- 83 -- -- --   09/04/22 2348 99.5 °F (37.5 °C) 83 19 139/68 90 %   09/04/22 2246 -- 89 20 (!) 151/66 93 %   09/04/22 2152 -- 84 18 -- 95 %   09/04/22 2151 -- 89 18 -- 95 %   09/04/22 2036 -- 75 16 -- (!) 89 %   09/04/22 2035 -- 74 18 -- 91 %   09/04/22 2018 -- -- 18 -- 94 %   09/04/22 1941 -- -- -- -- 97 %   09/1935 -- 80 -- (!) 149/68 91 %   09/04/22 1913 99.8 °F (37.7 °C) 83 16 132/68 92 %       Oxygen Therapy  SpO2: 95 %  Pulse via Oximetry: 89 beats per minute  Pulse Oximeter Device Mode: Continuous  O2 Device: Nasal cannula  O2 Flow Rate (L/min): 3 L/min (Weaned to 2L)    Estimated body mass index is 23.34 kg/m² as calculated from the following:    Height as of this encounter: 5' 4\" (1.626 m). Weight as of this encounter: 136 lb (61.7 kg).   No intake or output data in the 24 hours ending 09/05/22 1362      Physical Exam:     Blood pressure (!) 146/61, pulse 78, temperature 99.7 °F (37.6 °C), temperature source Oral, resp. rate 18, height 5' 4\" (1.626 m), weight 136 lb (61.7 kg), SpO2 95 %. General:    Well nourished. Alert and awake but very hard of hearing. Head:  Normocephalic, atraumatic  Eyes:  Sclerae appear normal.  Pupils equally round. ENT:  Nares appear normal, no drainage. Moist oral mucosa  Neck:  No restricted ROM. Trachea midline   CV:   RRR. No m/r/g. No jugular venous distension. Lungs:   Scattered crackles with wheezing. Symmetric expansion. Abdomen: Bowel sounds present. Soft, nontender, nondistended. Extremities: No cyanosis or clubbing. No edema  Skin:     No rashes and normal coloration. Warm and dry. Neuro:  CN II-XII grossly intact. Sensation intact. A&Ox3  Psych:  Normal mood and affect.       I have personally reviewed labs and tests showing:  Recent Labs:  Recent Results (from the past 48 hour(s))   COVID-19, Rapid    Collection Time: 09/04/22  7:34 PM    Specimen: Nasopharyngeal   Result Value Ref Range    Source Nasopharyngeal      SARS-CoV-2, Rapid Not detected NOTD     CBC with Auto Differential    Collection Time: 09/04/22  9:35 PM   Result Value Ref Range    WBC 3.5 (L) 4.3 - 11.1 K/uL    RBC 3.79 (L) 4.05 - 5.2 M/uL    Hemoglobin 11.7 11.7 - 15.4 g/dL    Hematocrit 34.8 (L) 35.8 - 46.3 %    MCV 91.8 79.6 - 97.8 FL    MCH 30.9 26.1 - 32.9 PG    MCHC 33.6 31.4 - 35.0 g/dL    RDW 13.2 11.9 - 14.6 %    Platelets 397 363 - 122 K/uL    MPV 9.8 9.4 - 12.3 FL    nRBC 0.00 0.0 - 0.2 K/uL    Differential Type AUTOMATED      Seg Neutrophils 38 (L) 43 - 78 %    Lymphocytes 43 13 - 44 %    Monocytes 15 (H) 4.0 - 12.0 %    Eosinophils % 3 0.5 - 7.8 %    Basophils 0 0.0 - 2.0 %    Immature Granulocytes 0 0.0 - 5.0 %    Segs Absolute 1.3 (L) 1.7 - 8.2 K/UL    Absolute Lymph # 1.5 0.5 - 4.6 K/UL    Absolute Mono # 0.5 0.1 - 1.3 K/UL    Absolute Eos # 0.1 0.0 - 0.8 K/UL    Basophils Absolute 0.0 0.0 - 0.2 K/UL    Absolute Immature Granulocyte 0.0 0.0 - 0.5 K/UL   Comprehensive Metabolic Panel    Collection Time: 09/04/22  9:35 PM   Result Value Ref Range    Sodium 137 136 - 145 mmol/L    Potassium 4.5 3.5 - 5.1 mmol/L    Chloride 103 101 - 110 mmol/L    CO2 29 21 - 32 mmol/L    Anion Gap 5 4 - 13 mmol/L    Glucose 142 (H) 65 - 100 mg/dL    BUN 20 8 - 23 MG/DL    Creatinine 1.13 (H) 0.6 - 1.0 MG/DL    GFR  59 (L) >60 ml/min/1.73m2    GFR Non- 49 (L) >60 ml/min/1.73m2    Calcium 8.8 8.3 - 10.4 MG/DL    Total Bilirubin 0.3 0.2 - 1.1 MG/DL    ALT 26 12 - 65 U/L    AST 70 (H) 15 - 37 U/L    Alk Phosphatase 67 50 - 136 U/L    Total Protein 7.1 6.3 - 8.2 g/dL    Albumin 3.0 (L) 3.2 - 4.6 g/dL    Globulin 4.1 (H) 2.3 - 3.5 g/dL    Albumin/Globulin Ratio 0.7 (L) 1.2 - 3.5     Lactic Acid    Collection Time: 09/04/22  9:35 PM   Result Value Ref Range    Lactic Acid, Plasma 1.1 0.4 - 2.0 MMOL/L   Troponin    Collection Time: 09/04/22  9:35 PM   Result Value Ref Range    Troponin, High Sensitivity 21.5 (H) 0 - 14 pg/mL   Brain Natriuretic Peptide    Collection Time: 09/04/22  9:35 PM   Result Value Ref Range    NT Pro- <450 PG/ML   EKG 12 Lead    Collection Time: 09/04/22 10:17 PM   Result Value Ref Range    Ventricular Rate 98 BPM    Atrial Rate 98 BPM    P-R Interval 156 ms    QRS Duration 84 ms    Q-T Interval 362 ms    QTc Calculation (Bazett) 462 ms    P Axis 74 degrees    R Axis 34 degrees    T Axis 98 degrees    Diagnosis Normal sinus rhythm    POCT Glucose    Collection Time: 09/05/22 12:18 AM   Result Value Ref Range    POC Glucose 177 (H) 65 - 100 mg/dL    Performed by: Maria Guadalupe Metcalf    Lactic Acid    Collection Time: 09/05/22  1:42 AM   Result Value Ref Range    Lactic Acid, Plasma 1.5 0.4 - 2.0 MMOL/L   Hemoglobin A1c    Collection Time: 09/05/22  1:42 AM   Result Value Ref Range    Hemoglobin A1C 6.8 (H) 4.8 - 5.6 %    eAG 148 mg/dL   Basic Metabolic Panel w/ Reflex to MG    Collection Time: 09/05/22  1:42 AM   Result Value Ref Range    Sodium 139 136 - 145 mmol/L    Potassium 2.7 (L) 3.5 - 5.1 mmol/L    Chloride 105 101 - 110 mmol/L    CO2 30 21 - 32 mmol/L    Anion Gap 4 4 - 13 mmol/L    Glucose 176 (H) 65 - 100 mg/dL    BUN 20 8 - 23 MG/DL    Creatinine 1.06 (H) 0.6 - 1.0 MG/DL    GFR African American >60 >60 ml/min/1.73m2    GFR Non- 52 (L) >60 ml/min/1.73m2    Calcium 8.5 8.3 - 10.4 MG/DL   CBC with Auto Differential    Collection Time: 09/05/22  1:42 AM   Result Value Ref Range    WBC 3.4 (L) 4.3 - 11.1 K/uL    RBC 3.69 (L) 4.05 - 5.2 M/uL    Hemoglobin 11.3 (L) 11.7 - 15.4 g/dL    Hematocrit 34.4 (L) 35.8 - 46.3 %    MCV 93.2 79.6 - 97.8 FL    MCH 30.6 26.1 - 32.9 PG    MCHC 32.8 31.4 - 35.0 g/dL    RDW 13.2 11.9 - 14.6 %    Platelets 961 112 - 064 K/uL    MPV 10.3 9.4 - 12.3 FL    nRBC 0.00 0.0 - 0.2 K/uL    Differential Type AUTOMATED      Seg Neutrophils 61 43 - 78 %    Lymphocytes 23 13 - 44 %    Monocytes 15 (H) 4.0 - 12.0 %    Eosinophils % 1 0.5 - 7.8 %    Basophils 0 0.0 - 2.0 %    Immature Granulocytes 0 0.0 - 5.0 %    Segs Absolute 2.1 1.7 - 8.2 K/UL    Absolute Lymph # 0.8 0.5 - 4.6 K/UL    Absolute Mono # 0.5 0.1 - 1.3 K/UL    Absolute Eos # 0.0 0.0 - 0.8 K/UL    Basophils Absolute 0.0 0.0 - 0.2 K/UL    Absolute Immature Granulocyte 0.0 0.0 - 0.5 K/UL   Magnesium    Collection Time: 09/05/22  1:42 AM   Result Value Ref Range    Magnesium 1.7 (L) 1.8 - 2.4 mg/dL   Basic Metabolic Panel    Collection Time: 09/05/22  6:05 AM   Result Value Ref Range    Sodium 140 136 - 145 mmol/L    Potassium 2.8 (L) 3.5 - 5.1 mmol/L    Chloride 107 101 - 110 mmol/L    CO2 31 21 - 32 mmol/L    Anion Gap 2 (L) 4 - 13 mmol/L    Glucose 123 (H) 65 - 100 mg/dL    BUN 18 8 - 23 MG/DL    Creatinine 0.90 0.6 - 1.0 MG/DL    GFR African American >60 >60 ml/min/1.73m2    GFR Non- >60 >60 ml/min/1.73m2    Calcium 8.3 8.3 - 10.4 MG/DL   POCT Glucose Collection Time: 09/05/22  6:33 AM   Result Value Ref Range    POC Glucose 125 (H) 65 - 100 mg/dL    Performed by: Richa Harden    POCT Glucose    Collection Time: 09/05/22 11:45 AM   Result Value Ref Range    POC Glucose 171 (H) 65 - 100 mg/dL    Performed by: Rc    Basic Metabolic Panel w/ Reflex to MG    Collection Time: 09/05/22  2:07 PM   Result Value Ref Range    Sodium 140 136 - 145 mmol/L    Potassium 4.2 3.5 - 5.1 mmol/L    Chloride 107 101 - 110 mmol/L    CO2 28 21 - 32 mmol/L    Anion Gap 5 4 - 13 mmol/L    Glucose 210 (H) 65 - 100 mg/dL    BUN 18 8 - 23 MG/DL    Creatinine 1.02 (H) 0.6 - 1.0 MG/DL    GFR African American >60 >60 ml/min/1.73m2    GFR Non- 55 (L) >60 ml/min/1.73m2    Calcium 8.3 8.3 - 10.4 MG/DL   POCT Glucose    Collection Time: 09/05/22  4:42 PM   Result Value Ref Range    POC Glucose 212 (H) 65 - 100 mg/dL    Performed by: Faustino Licona        I have personally reviewed imaging studies showing: Other Studies:  XR PELVIS (1-2 VIEWS)   Final Result      1. No evidence of acute pelvic fracture. XR CHEST 1 VIEW   Final Result   1. No acute process.               Current Meds:  Current Facility-Administered Medications   Medication Dose Route Frequency    insulin lispro (HUMALOG) injection vial 0-8 Units  0-8 Units SubCUTAneous TID WC    insulin lispro (HUMALOG) injection vial 0-4 Units  0-4 Units SubCUTAneous Nightly    glucose chewable tablet 16 g  4 tablet Oral PRN    dextrose bolus 10% 125 mL  125 mL IntraVENous PRN    Or    dextrose bolus 10% 250 mL  250 mL IntraVENous PRN    glucagon (rDNA) injection 1 mg  1 mg SubCUTAneous PRN    dextrose 10 % infusion   IntraVENous Continuous PRN    sodium chloride flush 0.9 % injection 5-40 mL  5-40 mL IntraVENous 2 times per day    sodium chloride flush 0.9 % injection 5-40 mL  5-40 mL IntraVENous PRN    0.9 % sodium chloride infusion   IntraVENous PRN    magnesium sulfate 2000 mg in 50 mL IVPB premix  2,000 mg IntraVENous PRN    promethazine (PHENERGAN) tablet 12.5 mg  12.5 mg Oral Q6H PRN    Or    ondansetron (ZOFRAN) injection 4 mg  4 mg IntraVENous Q6H PRN    polyethylene glycol (GLYCOLAX) packet 17 g  17 g Oral Daily    bisacodyl (DULCOLAX) EC tablet 5 mg  5 mg Oral Daily PRN    aluminum & magnesium hydroxide-simethicone (MAALOX) 200-200-20 MG/5ML suspension 30 mL  30 mL Oral Q6H PRN    acetaminophen (TYLENOL) tablet 650 mg  650 mg Oral Q6H PRN    Or    acetaminophen (TYLENOL) suppository 650 mg  650 mg Rectal Q6H PRN    HYDROcodone homatropine (HYCODAN) 5-1.5 MG/5ML solution 5 mL  5 mL Oral Q4H PRN    guaiFENesin-dextromethorphan (ROBITUSSIN DM) 100-10 MG/5ML syrup 5 mL  5 mL Oral Q4H PRN    heparin (porcine) injection 5,000 Units  5,000 Units SubCUTAneous 3 times per day    methylPREDNISolone sodium (SOLU-MEDROL) injection 40 mg  40 mg IntraVENous Daily    [START ON 9/6/2022] furosemide (LASIX) tablet 20 mg  20 mg Oral Daily       Signed:  Kobi Johnson MD    Part of this note may have been written by using a voice dictation software. The note has been proof read but may still contain some grammatical/other typographical errors.

## 2022-09-06 VITALS
WEIGHT: 136 LBS | RESPIRATION RATE: 16 BRPM | TEMPERATURE: 98.6 F | BODY MASS INDEX: 23.22 KG/M2 | SYSTOLIC BLOOD PRESSURE: 152 MMHG | DIASTOLIC BLOOD PRESSURE: 73 MMHG | OXYGEN SATURATION: 95 % | HEIGHT: 64 IN | HEART RATE: 88 BPM

## 2022-09-06 PROBLEM — J10.1 INFLUENZA A: Status: ACTIVE | Noted: 2022-09-06

## 2022-09-06 PROBLEM — J96.01 ACUTE RESPIRATORY FAILURE WITH HYPOXIA (HCC): Status: RESOLVED | Noted: 2022-09-05 | Resolved: 2022-09-06

## 2022-09-06 LAB
ANION GAP SERPL CALC-SCNC: 4 MMOL/L (ref 4–13)
BUN SERPL-MCNC: 15 MG/DL (ref 8–23)
CALCIUM SERPL-MCNC: 8.1 MG/DL (ref 8.3–10.4)
CHLORIDE SERPL-SCNC: 110 MMOL/L (ref 101–110)
CO2 SERPL-SCNC: 29 MMOL/L (ref 21–32)
CREAT SERPL-MCNC: 0.68 MG/DL (ref 0.6–1)
FLUAV AG NPH QL IA: POSITIVE
FLUBV AG NPH QL IA: NEGATIVE
GLUCOSE BLD STRIP.AUTO-MCNC: 160 MG/DL (ref 65–100)
GLUCOSE SERPL-MCNC: 160 MG/DL (ref 65–100)
MAGNESIUM SERPL-MCNC: 2.3 MG/DL (ref 1.8–2.4)
POTASSIUM SERPL-SCNC: 3.3 MMOL/L (ref 3.5–5.1)
PROCALCITONIN SERPL-MCNC: <0.05 NG/ML (ref 0–0.49)
SERVICE CMNT-IMP: ABNORMAL
SODIUM SERPL-SCNC: 143 MMOL/L (ref 136–145)
SPECIMEN SOURCE: ABNORMAL

## 2022-09-06 PROCEDURE — 97535 SELF CARE MNGMENT TRAINING: CPT

## 2022-09-06 PROCEDURE — 84145 PROCALCITONIN (PCT): CPT

## 2022-09-06 PROCEDURE — G0378 HOSPITAL OBSERVATION PER HR: HCPCS

## 2022-09-06 PROCEDURE — 80048 BASIC METABOLIC PNL TOTAL CA: CPT

## 2022-09-06 PROCEDURE — 97530 THERAPEUTIC ACTIVITIES: CPT

## 2022-09-06 PROCEDURE — 94760 N-INVAS EAR/PLS OXIMETRY 1: CPT

## 2022-09-06 PROCEDURE — 97165 OT EVAL LOW COMPLEX 30 MIN: CPT

## 2022-09-06 PROCEDURE — 97161 PT EVAL LOW COMPLEX 20 MIN: CPT

## 2022-09-06 PROCEDURE — 87804 INFLUENZA ASSAY W/OPTIC: CPT

## 2022-09-06 PROCEDURE — 94761 N-INVAS EAR/PLS OXIMETRY MLT: CPT

## 2022-09-06 PROCEDURE — 83735 ASSAY OF MAGNESIUM: CPT

## 2022-09-06 PROCEDURE — 6370000000 HC RX 637 (ALT 250 FOR IP): Performed by: INTERNAL MEDICINE

## 2022-09-06 PROCEDURE — 96361 HYDRATE IV INFUSION ADD-ON: CPT

## 2022-09-06 PROCEDURE — 36415 COLL VENOUS BLD VENIPUNCTURE: CPT

## 2022-09-06 PROCEDURE — 82962 GLUCOSE BLOOD TEST: CPT

## 2022-09-06 RX ORDER — PREDNISONE 20 MG/1
40 TABLET ORAL DAILY
Status: DISCONTINUED | OUTPATIENT
Start: 2022-09-06 | End: 2022-09-06 | Stop reason: HOSPADM

## 2022-09-06 RX ORDER — OSELTAMIVIR PHOSPHATE 30 MG/1
30 CAPSULE ORAL 2 TIMES DAILY
Status: DISCONTINUED | OUTPATIENT
Start: 2022-09-06 | End: 2022-09-06 | Stop reason: HOSPADM

## 2022-09-06 RX ORDER — POTASSIUM CHLORIDE 20 MEQ/1
40 TABLET, EXTENDED RELEASE ORAL ONCE
Status: COMPLETED | OUTPATIENT
Start: 2022-09-06 | End: 2022-09-06

## 2022-09-06 RX ORDER — OSELTAMIVIR PHOSPHATE 75 MG/1
75 CAPSULE ORAL 2 TIMES DAILY
Qty: 10 CAPSULE | Refills: 0 | Status: SHIPPED | OUTPATIENT
Start: 2022-09-06 | End: 2022-09-11

## 2022-09-06 RX ORDER — PREDNISONE 20 MG/1
40 TABLET ORAL DAILY
Qty: 8 TABLET | Refills: 0 | Status: SHIPPED | OUTPATIENT
Start: 2022-09-07 | End: 2022-09-11

## 2022-09-06 RX ORDER — PREDNISONE 20 MG/1
40 TABLET ORAL DAILY
Qty: 8 TABLET | Refills: 0 | Status: SHIPPED | OUTPATIENT
Start: 2022-09-07 | End: 2022-09-06 | Stop reason: SDUPTHER

## 2022-09-06 RX ORDER — POTASSIUM CHLORIDE 20 MEQ/1
20 TABLET, EXTENDED RELEASE ORAL ONCE
Status: COMPLETED | OUTPATIENT
Start: 2022-09-06 | End: 2022-09-06

## 2022-09-06 RX ADMIN — POTASSIUM CHLORIDE 20 MEQ: 1500 TABLET, EXTENDED RELEASE ORAL at 11:41

## 2022-09-06 RX ADMIN — PREDNISONE 40 MG: 20 TABLET ORAL at 09:17

## 2022-09-06 RX ADMIN — POTASSIUM CHLORIDE 40 MEQ: 1500 TABLET, EXTENDED RELEASE ORAL at 08:52

## 2022-09-06 RX ADMIN — FUROSEMIDE 20 MG: 20 TABLET ORAL at 08:52

## 2022-09-06 RX ADMIN — OSELTAMIVIR PHOSPHATE 30 MG: 30 CAPSULE ORAL at 11:41

## 2022-09-06 NOTE — PROGRESS NOTES
09/06/22 0944   Resting (Room Air)   SpO2 94   HR 95   Resting (On O2)   SpO2   (NA)   During Walk (Room Air)   SpO2 91      Walk/Assistance Device Walker   Rate of Dyspnea 0   After Walk   Does the Patient Qualify for Home O2 No   Does the Patient Need Portable Oxygen Tanks No

## 2022-09-06 NOTE — DISCHARGE SUMMARY
Hospitalist Discharge Summary   Admit Date:  2022  7:16 PM   DC Note date: 2022  Name:  Kishor Malik   Age:  80 y.o. Sex:  female  :  1935   MRN:  [id number]   Room:  [facility]  PCP:  Francisco Aggarwal MD    Presenting Complaint: Cough     Initial Admission Diagnosis: Cough [R05.9]  Hypoxia [R09.02]  Acute respiratory failure with hypoxia ([address] [J96.01]     Problem List for this Hospitalization (present on admission):    Principal Problem (Resolved):    Acute respiratory failure with hypoxia ([address]  Active Problems:    Hypokalemia    SAWYER (acute kidney injury) ([facility]    Essential hypertension    Type 2 diabetes mellitus without complication, without long-term current use of insulin [facility])      Hospital Course:  Please refer to the admission H&P for details of presentation. In summary, Kishor Malik is a 80 y.o. female with past medical history significant for Hypertension ,diabetes, dementia who presented with cough and hypoxia from NH. Had covid 19 in 8/10 but neg on admission. Initially in the ER, her O2 saturations were 93 to 94% on room air and walking. The ER physician had plan to discharge her, but then her O2 saturations dropped to 87 to 88% on room air. Started on Solu-Medrol due to wheezing on examination. Patient respiratory symptoms have improved and has been weaned to room air. 6-minute walk test shows no oxygen requirement on ambulation or at rest.  Influenza A is positive and started on Tamiflu. Patient is medically stable for discharge. Patient is to continue taking medications as prescribed and to follow up with PCP on discharge. Patient is instructed to to call a physician or return to ED if any concerns/symptoms worsened. Discharge summary and encounter summary was sent to PCP electronically via \"Comm Mgt\" link in [facility], if possible. Disposition: Home ([facility])  Diet: ADULT DIET;  Regular; 4 carb choices (60 gm/meal)  Code Status: Full Code    Follow Ups:   Follow-up Information     Marylen Dacosta, MD Follow up. Specialty: Internal Medicine  Why: As needed  Contact information:  [address] [place] [address]  [phone]                       Time spent in patient discharge and coordination 35 minutes. Follow up labs/diagnostics (ultimately defer to outpatient provider): BMP to check for Potassium    Plan was discussed with patient and daughter. All questions answered. Patient was stable at time of discharge. Instructions given to call a physician or return if any concerns. Current Discharge Medication List        START taking these medications    Details   predniSONE (DELTASONE) 20 MG tablet Take 2 tablets by mouth daily for 4 days  Qty: 8 tablet, Refills: 0           CONTINUE these medications which have NOT CHANGED    Details   furosemide (LASIX) 20 MG tablet Take 20 mg by mouth daily      potassium chloride (MICRO-K) 10 MEQ extended release capsule Take 10 mEq by mouth daily      Cholecalciferol (VITAMIN D3) 50 MCG (2000 UT) CAPS Take by mouth      acetaminophen (TYLENOL) 500 MG tablet Take 500 mg by mouth every 6 hours as needed for Pain      aspirin 325 MG EC tablet Take 325 mg by mouth daily      hydrOXYzine (ATARAX) 50 MG tablet Take 25 mg by mouth daily as needed      memantine (NAMENDA) 5 MG tablet Take 5 mg by mouth daily           STOP taking these medications       D-MANNOSE PO Comments:   Reason for Stopping:         Flaxseed Oil OIL Comments:   Reason for Stopping:         amLODIPine (NORVASC) 10 MG tablet Comments:   Reason for Stopping:         cetirizine (ZYRTEC) 10 MG tablet Comments:   Reason for Stopping:         triamcinolone (KENALOG) 0.1 % cream Comments:   Reason for Stopping:               Procedures done this admission:  * No surgery found *    Consults this admission:  None    Echocardiogram results:  No results found for this or any previous visit.       Diagnostic Imaging/Tests:   XR PELVIS (1-2 VIEWS)    Result Date: 9/4/2022  1. No evidence of acute pelvic fracture. XR CHEST 1 VIEW    Result Date: 9/4/2022  1. No acute process.         Labs: Results:       BMP, Mg, Phos Recent Labs     09/05/22  0142 09/05/22  0605 09/05/22  1407 09/06/22  0605    140 140 143   K 2.7* 2.8* 4.2 3.3*    107 107 110   CO2 30 31 28 29   ANIONGAP 4 2* 5 4   BUN 20 18 18 15   CREATININE 1.06* 0.90 1.02* 0.68   LABGLOM 52* >60 55* >60   GFRAA >60 >60 >60 >60   CALCIUM 8.5 8.3 8.3 8.1*   GLUCOSE 176* 123* 210* 160*   MG 1.7*  --   --  2.3      CBC Recent Labs     09/04/22 2135 09/05/22 0142   WBC 3.5* 3.4*   RBC 3.79* 3.69*   HGB 11.7 11.3*   HCT 34.8* 34.4*   MCV 91.8 93.2   MCH 30.9 30.6   MCHC 33.6 32.8   RDW 13.2 13.2    170   MPV 9.8 10.3   NRBC 0.00 0.00   SEGS 38* 61   LYMPHOPCT 43 23   EOSRELPCT 3 1   MONOPCT 15* 15*   BASOPCT 0 0   IMMGRAN 0 0   SEGSABS 1.3* 2.1   LYMPHSABS 1.5 0.8   EOSABS 0.1 0.0   MONOSABS 0.5 0.5   BASOSABS 0.0 0.0   ABSIMMGRAN 0.0 0.0      LFT Recent Labs     09/04/22 2135   BILITOT 0.3   ALKPHOS 67   AST 70*   ALT 26   PROT 7.1   LABALBU 3.0*   GLOB 4.1*      Cardiac  Lab Results   Component Value Date/Time    NTPROBNP 129 09/04/2022 09:35 PM    TROPHS 21.5 09/04/2022 09:35 PM    TROPHS 53.7 11/17/2021 06:24 PM    TROPHS 60.4 11/17/2021 08:14 AM      Coags Lab Results   Component Value Date/Time    PROTIME 13.0 11/17/2021 08:14 AM    INR 1.0 11/17/2021 08:14 AM      A1c Lab Results   Component Value Date/Time    LABA1C 6.8 09/05/2022 01:42 AM    LABA1C 5.7 05/17/2021 10:37 AM    LABA1C 6.3 10/26/2020 10:10 AM     09/05/2022 01:42 AM     05/17/2021 10:37 AM     10/26/2020 10:10 AM      Lipids Lab Results   Component Value Date/Time    CHOL 233 05/17/2021 10:37 AM    LDLCALC 159 05/17/2021 10:37 AM    HDL 53 05/17/2021 10:37 AM    TRIG 119 05/17/2021 10:37 AM      Thyroid  No results found for: TSHELE, IUE7PPG     Most Recent UA Lab Results   Component Value Date/Time    COLORU ALISSA 11/19/2021 02:43 PM    SPECGRAV 1.023 11/19/2021 02:43 PM    PROTEINU 30 11/19/2021 02:43 PM    GLUCOSEU Negative 11/19/2021 02:43 PM    GLUCOSEU Negative 11/02/2020 02:19 PM    KETUA Negative 11/19/2021 02:43 PM    BILIRUBINUR Negative 11/19/2021 02:43 PM    BLOODU Negative 11/19/2021 02:43 PM    UROBILINOGEN 0.2 11/02/2020 02:19 PM    NITRU Negative 11/19/2021 02:43 PM    LEUKOCYTESUR Negative 11/19/2021 02:43 PM    WBCUA 3-5 11/19/2021 02:43 PM    RBCUA 0-3 11/19/2021 02:43 PM    BACTERIA TRACE 11/19/2021 02:43 PM    MUCUS 3+ 11/19/2021 02:43 PM        No results for input(s): CULTURE in the last 720 hours.     All Labs from Last 24 Hrs:  Recent Results (from the past 24 hour(s))   POCT Glucose    Collection Time: 09/05/22 11:45 AM   Result Value Ref Range    POC Glucose 171 (H) 65 - 100 mg/dL    Performed by: Rc    Basic Metabolic Panel w/ Reflex to MG    Collection Time: 09/05/22  2:07 PM   Result Value Ref Range    Sodium 140 136 - 145 mmol/L    Potassium 4.2 3.5 - 5.1 mmol/L    Chloride 107 101 - 110 mmol/L    CO2 28 21 - 32 mmol/L    Anion Gap 5 4 - 13 mmol/L    Glucose 210 (H) 65 - 100 mg/dL    BUN 18 8 - 23 MG/DL    Creatinine 1.02 (H) 0.6 - 1.0 MG/DL    GFR African American >60 >60 ml/min/1.73m2    GFR Non- 55 (L) >60 ml/min/1.73m2    Calcium 8.3 8.3 - 10.4 MG/DL   POCT Glucose    Collection Time: 09/05/22  4:42 PM   Result Value Ref Range    POC Glucose 212 (H) 65 - 100 mg/dL    Performed by: Rc    POCT Glucose    Collection Time: 09/05/22  9:10 PM   Result Value Ref Range    POC Glucose 261 (H) 65 - 100 mg/dL    Performed by: Briseida    Basic Metabolic Panel w/ Reflex to MG    Collection Time: 09/06/22  6:05 AM   Result Value Ref Range    Sodium 143 136 - 145 mmol/L    Potassium 3.3 (L) 3.5 - 5.1 mmol/L    Chloride 110 101 - 110 mmol/L    CO2 29 21 - 32 mmol/L    Anion Gap 4 4 - 13 mmol/L    Glucose 160 (H) 65 - 100 mg/dL    BUN 15 8 - 23 MG/DL    Creatinine 0.68 0.6 - 1.0 MG/DL    GFR African American >60 >60 ml/min/1.73m2    GFR Non- >60 >60 ml/min/1.73m2    Calcium 8.1 (L) 8.3 - 10.4 MG/DL   POCT Glucose    Collection Time: 09/06/22  6:05 AM   Result Value Ref Range    POC Glucose 160 (H) 65 - 100 mg/dL    Performed by: Earline    Magnesium    Collection Time: 09/06/22  6:05 AM   Result Value Ref Range    Magnesium 2.3 1.8 - 2.4 mg/dL   Procalcitonin    Collection Time: 09/06/22  6:05 AM   Result Value Ref Range    Procalcitonin <0.05 0.00 - 0.49 ng/mL       Allergies   Allergen Reactions    Donepezil Itching     Immunization History   Administered Date(s) Administered    Influenza, FLUAD, (age 72 y+), Adjuvanted, 0.5mL 09/21/2020    Influenza, High Dose (Fluzone 65 yrs and older) 11/14/2017, 09/25/2018    PPD Test 11/17/2021    Pneumococcal Conjugate 13-valent ([id number]) 03/05/2018    Pneumococcal Polysaccharide ([id number]) 09/15/2016    Tdap (Boostrix, Adacel) 11/02/2020    Zoster Recombinant (Shingrix) 11/02/2020, 01/13/2021       Recent Vital Data:  Patient Vitals for the past 24 hrs:   Temp Pulse Resp BP SpO2   09/06/22 0845 -- -- -- -- 94 %   09/06/22 0746 -- 76 16 -- 95 %   09/06/22 0730 98.1 °F (36.7 °C) 76 16 134/74 97 %   09/06/22 0257 98.3 °F (36.8 °C) 78 18 (!) 154/69 96 %   09/05/22 2304 99.1 °F (37.3 °C) 90 18 (!) 160/74 90 %   09/05/22 2038 -- 74 20 -- 93 %   09/05/22 1924 98.8 °F (37.1 °C) 84 18 (!) 142/82 90 %   09/05/22 1511 99.7 °F (37.6 °C) 78 18 (!) 146/61 95 %   09/05/22 1121 98.6 °F (37 °C) 80 18 (!) 141/57 97 %       Oxygen Therapy  SpO2: 94 %  Pulse via Oximetry: 89 beats per minute  Pulse Oximeter Device Mode: Continuous  Pulse Oximeter Device Location: Left, Finger  O2 Device: None (Room air)  O2 Flow Rate (L/min): 0 L/min    Estimated body mass index is 23.34 kg/m² as calculated from the following:    Height as of this encounter: 5' 4\" (1.626 m).     Weight as of this encounter: 136 lb (61.7 kg). No intake or output data in the 24 hours ending 09/06/22 1037      Physical Exam:    General:          Well nourished. Alert and awake but very hard of hearing. Head:               Normocephalic, atraumatic  Eyes:               Sclerae appear normal.  Pupils equally round. ENT:                Nares appear normal, no drainage. Moist oral mucosa  Neck:               No restricted ROM. Trachea midline   CV:                  RRR. No m/r/g. No jugular venous distension. Lungs:             Scattered crackles. No wheezing. Symmetric expansion. Abdomen: Bowel sounds present. Soft, nontender, nondistended. Extremities:     No cyanosis or clubbing. No edema  Skin:                No rashes and normal coloration. Warm and dry. Neuro:             CN II-XII grossly intact. Sensation intact. A&Ox3  Psych:             Normal mood and affect. Signed:  Kym Santos MD    Part of this note may have been written by using a voice dictation software. The note has been proof read but may still contain some grammatical/other typographical errors.

## 2022-09-06 NOTE — PROGRESS NOTES
PHYSICAL THERAPY Initial Assessment and AM  (Link to Caseload Tracking: PT Visit Days : 1  Acknowledge Orders  Time In/Out  PT Charge Capture  Rehab Caseload Tracker    Parul Boss is a 80 y.o. female   PRIMARY DIAGNOSIS: Acute respiratory failure with hypoxia (HCC)  Cough [R05.9]  Hypoxia [R09.02]  Acute respiratory failure with hypoxia ([address] [J96.01]       Reason for Referral: Generalized Muscle Weakness (M62.81)  Difficulty in walking, Not elsewhere classified (R26.2)  Observation: Payor: [employer] MEDICARE / Plan: [employer] GOLD PLUS HMO / Product Type: *No Product type* /     ASSESSMENT:     REHAB RECOMMENDATIONS:   Recommendation to date pending progress:  Setting:  Home Health Therapy    Equipment:    None     ASSESSMENT:  Ms. Man Plummer here with above diagnosis, she is [place] and has dementia and resides at an [facility] where they assist her with ADLs. Pt uses a walker for mobility and has a w/c, elevated toilet seat and shower chair. She presents with generalized weakness and will benefit from skilled PT interventions to maximize mobility and strengthening. Pt walked in and out of bathroom with OT and got dressed, then worked on walking in childers with [address] [place] [address]. Pt easily distracted but overall doing well. Plan is for her to return to her [facility] possibly today. If she is still here tomorrow will follow up with pt, otherwise recommend HHPT for follow up.       [facility] [address] [address] -[facility] 6 Clicks Basic Mobility Inpatient Short Form  AM-PAC Mobility Inpatient   How much difficulty turning over in bed?: None  How much difficulty sitting down on / standing up from a chair with arms?: None  How much difficulty moving from lying on back to sitting on side of bed?: None  How much help from another person moving to and from a bed to a chair?: None  How much help from another person needed to walk in hospital room?: A Little  How much help from another person for climbing 3-5 steps with a railing?: A Little  AM-[facility] Inpatient Mobility Raw Score : 22  AM-PAC Inpatient T-Scale Score : 53.28  Mobility Inpatient CMS 0-100% Score: 20.91  Mobility Inpatient CMS G-Code Modifier : CJ    SUBJECTIVE:   Ms. Kathryn Lloyd states, \"OK\"     Social/Functional Lives With: Other (comment) ([facility])  Type of Home: Assisted living  Home Layout: One level  Home Access: Level entry  Home Equipment: Grab bars, Rollator, Walker, rolling, Wheelchair-manual  Receives Help From: Other (comment)  ADL Assistance: Needs assistance  Ambulation Assistance: Needs assistance  Transfer Assistance: Needs assistance  Active : No  Occupation: Retired    OBJECTIVE:     PAIN: Hay Males / O2: Gaby Bores / Tyron Lyme / Ann Marie Lowing:   Pre Treatment:   Pain Assessment: None - Denies Pain      Post Treatment: 0-10 Vitals        Oxygen      None    RESTRICTIONS/PRECAUTIONS:                    GROSS EVALUATION: Intact Impaired (Comments):   AROM [x]     PROM [x]    Strength []  Generally decreased, WFL   Balance [] Posture: Fair  Sitting - Static: Good  Sitting - Dynamic: Good  Standing - Static: Fair, +  Standing - Dynamic: Fair   Posture [] Forward Head  Rounded Shoulders   Sensation [x]     Coordination []   Generally decreased   Tone [x]     Edema [x]    Activity Tolerance [x]      []      COGNITION/  PERCEPTION: Intact Impaired (Comments):   Orientation []  dementia   Vision [x]     Hearing [] Bilateral hearing aid   Cognition  [] Overall Cognitive Status: Exceptionsdementia     MOBILITY: I Mod I S SBA CGA Min Mod Max Total  NT x2 Comments:   Bed Mobility    Rolling [] [] [] [] [] [] [] [] [] [] []    Supine to Sit [] [] [] [] [] [] [] [] [] [] []    Scooting [] [] [] [] [] [] [] [] [] [] []    Sit to Supine [] [] [] [] [] [] [] [] [] [] []    Transfers    Sit to Stand [] [] [] [x] [] [] [] [] [] [] []    Bed to Chair [] [] [] [x] [] [] [] [] [] [] []    Stand to Sit [] [] [] [x] [] [] [] [] [] [] []     [] [] [] [] [] [] [] [] [] [] []    I=Independent, Mod I=Modified Independent, S=Supervision, SBA=Standby Assistance, CGA=Contact Kartik Schwab,   Min=Minimal Assistance, Mod=Moderate Assistance, Max=Maximal Assistance, Total=Total Assistance, NT=Not Tested    GAIT: I Mod I S SBA CGA Min Mod Max Total  NT x2 Comments:   Level of Assistance [] [] [] [x] [] [] [] [] [] [] []    Distance 200 feet    DME Rolling Walker    Gait Quality Decreased step clearance, Decreased step length, and Shuffling     Weightbearing Status      Stairs      I=Independent, Mod I=Modified Independent, S=Supervision, SBA=Standby Assistance, CGA=Contact Guard Assistance,   Min=Minimal Assistance, Mod=Moderate Assistance, Max=Maximal Assistance, Total=Total Assistance, NT=Not Tested    PLAN:   ACUTE PHYSICAL THERAPY GOALS:   (Developed with and agreed upon by patient and/or caregiver.)  STG:  (1.)Ms. Brace will move from supine to sit and sit to supine  with INDEPENDENCE within 4-7 treatment day(s). (2.)Ms. Brace will transfer from bed to chair and chair to bed with SUPERVISION using the least restrictive device within 4-7 treatment day(s). (3.)Ms. Brace will ambulate with SUPERVISION for 400 feet with the least restrictive device within 4-7 treatment day(s).      ________________________________________________________________________________________________     FREQUENCY AND DURATION: Daily for duration of hospital stay or until stated goals are met, whichever comes first.    THERAPY PROGNOSIS: Good    PROBLEM LIST:   (Skilled intervention is medically necessary to address:)  Decreased ADL/Functional Activities  Decreased Activity Tolerance  Decreased Gait Ability  Decreased Safety Awareness  Decreased Transfer Abilities INTERVENTIONS PLANNED:   (Benefits and precautions of physical therapy have been discussed with the patient.)  Therapeutic Activity  Therapeutic Exercise/HEP  Gait Training  Education       TREATMENT:   EVALUATION: LOW COMPLEXITY: (Untimed Charge)    TREATMENT:   Co-Treatment PT/OT necessary due to patient's decreased overall endurance/tolerance levels, as well as need for high level skilled assistance to complete functional transfers/mobility and functional tasks  Therapeutic Activity (10 Minutes): Therapeutic activity included Transfer Training, Ambulation on level ground, Sitting balance , and Standing balance to improve functional Activity tolerance, Balance, Coordination, Mobility, and Strength.     TREATMENT GRID:  N/A    AFTER TREATMENT PRECAUTIONS: Alarm Activated, Bed/Chair Locked, Chair, and RN notified    INTERDISCIPLINARY COLLABORATION:  RN/ PCT and OT/ VILLA    EDUCATION: Education Outcome: Continued education needed    TIME IN/OUT:  Time In: 1006  Time Out: 1026  Minutes: Raffy Carrasquillo PT

## 2022-09-06 NOTE — CARE COORDINATION
09/06/22 [address] Children   Prior Functional Level Assistance with the following:;Bathing;Cooking;Housework; Shopping;Mobility;Dressing; Toileting   Current Functional Level Assistance with the following:;Bathing;Dressing; Toileting;Cooking;Housework; Shopping;Mobility   Can patient return to prior living arrangement Yes   Ability to make needs known: Fair   Family able to assist with home care needs: Yes   Social/Functional History   Lives With Other (comment)  (Assisted living [address])   Type of Home Assisted living   ADL Assistance Needs assistance   Ambulation Assistance Needs assistance   Transfer Assistance Independent   Active  Yes   Occupation Retired   Discharge Planning   Type of [place] Other (Comment)  ([facility])   DME Ordered? No   Potential Assistance Purchasing Medications No   Type of Home Care Services None   Patient expects to be discharged to: Assisted living   [address] Discharge   Transition of Care Consult (CM Consult) [address] Discharge Assisted living   Condition of Participation: Discharge Planning   The Plan for Transition of Care is related to the following treatment goals: Return to [address] [facility]   The Patient and/Or Patient Representative agree with the Discharge Plan? Yes  (Kiran Valerio in agreement with d/c)   Interdisciplinary team meeting with Dr. Vicky Salazar, CM, nursing, PT, and hospice for plan of care. Patient medically ready for d/c today. GREY called [facility] spoke with Magalie Trejo and she request a flu test and this was done and was positive. Per Dr. Vicky Salazar patient afebrile, asymptomatic and can d/c. CM called [facility] back and they are in agreement with her being d/c back to facility. CM called patient kiran Valerio and she will transport patient to [facility].  CM provided daughter with d/c summary, H/P, scripts, AVS and flu test packet given to daughter to give to the retirement. Daughter in agreement with d/c today and transport patient back to the [facility]. [facility] d[facility] to [facility].

## 2022-09-06 NOTE — PROGRESS NOTES
OCCUPATIONAL THERAPY Initial Assessment, Daily Note, Discharge, and AM       OT Visit Days: 1  Acknowledge Orders  Time  OT Charge Capture  Rehab Caseload Tracker      Solange Orozco is a 80 y.o. female   PRIMARY DIAGNOSIS: Acute respiratory failure with hypoxia (HCC)  Cough [R05.9]  Hypoxia [R09.02]  Acute respiratory failure with hypoxia ([address] [J96.01]       Reason for Referral: Generalized Muscle Weakness (M62.81)  Observation: Payor: [employer]ICARE / Plan: [employer] G[employer]US HMO / Product Type: *No Product type* /     ASSESSMENT:     REHAB RECOMMENDATIONS:   Recommendation to date pending progress:  Setting:  No further skilled therapy after discharge from hospital    Equipment:    None     ASSESSMENT:  Ms. Deneen Dawkins was admitted with above diagnosis. Pt seen in room with PT and RN present due to pt's confusion. Pt is noted to be quite confused but moves well and is at baseline and should do well to return to assistive living assistance. Pt up in room with RW, to bathroom for toileting, dressing and grooming at the sink. Pt ambulated community distance with RW. Pt returned to room to chair with alarm on and RN present.       [facility] [address] [id number] AM-PAC 6 Clicks Daily Activity Inpatient Short Form:    AM-PAC Daily Activity Inpatient   How much help for putting on and taking off regular lower body clothing?: None  How much help for Bathing?: None  How much help for Toileting?: None  How much help for putting on and taking off regular upper body clothing?: None  How much help for taking care of personal grooming?: None  How much help for eating meals?: None  AM-PAC Inpatient Daily Activity Raw Score: 24  AM-PAC Inpatient ADL T-Scale Score : 57.54  ADL Inpatient CMS 0-100% Score: 0  ADL Inpatient CMS G-Code Modifier : CH           SUBJECTIVE:     Ms. Deneen Dawkins states she is at [address] [place] [address] With: Other (comment) (ALETA)  Type of Home: Assisted living  Home Layout: One level  Home Access: Level entry  Home Equipment: Grab bars, Rollator, Walker, rolling, [email or web address] Help From: Other (comment)  ADL Assistance: Needs assistance  Ambulation Assistance: Needs assistance  Transfer Assistance: Needs assistance  Active : No  Occupation: Retired    OBJECTIVE:     TASIA / Jayshree Cuenca / Annetta Saucedo: None    RESTRICTIONS/PRECAUTIONS:       PAIN: Gaylyn Nam / O2:   Pre Treatment:          Post Treatment: none       Vitals          Oxygen            GROSS EVALUATION: INTACT IMPAIRED   (See Comments)   UE AROM [x] []   UE PROM [x] []   Strength [x]       Posture / Balance [x]     Sensation [x]     Coordination [x]       Tone [x]       Edema []    Activity Tolerance [x]       Hand Dominance R [] L []      COGNITION/  PERCEPTION: INTACT IMPAIRED   (See Comments)   Orientation []  Oriented to person only   Vision [x]     Hearing []  Hearing devices bilateral   Cognition  []     Perception [x]       MOBILITY: I Mod I S SBA CGA Min Mod Max Total  NT x2 Comments:   Bed Mobility    Rolling [] [] [] [] [] [] [] [] [] [] []    Supine to Sit [] [] [] [] [] [] [] [] [] [] []    Scooting [] [] [] [] [] [] [] [] [] [] []    Sit to Supine [] [] [] [] [] [] [] [] [] [] []    Transfers    Sit to Stand [] [] [] [x] [] [] [] [] [] [] []    Bed to Chair [] [] [] [x] [] [] [] [] [] [] []    Stand to Sit [] [] [] [x] [] [] [] [] [] [] []    Tub/Shower [] [] [] [] [] [] [] [] [] [] []     Toilet [] [] [] [x] [] [] [] [] [] [] []      [] [] [] [] [] [] [] [] [] [] []    I=Independent, Mod I=Modified Independent, S=Supervision/Setup, SBA=Standby Assistance, CGA=Contact Guard Assistance, Min=Minimal Assistance, Mod=Moderate Assistance, Max=Maximal Assistance, Total=Total Assistance, NT=Not Tested    ACTIVITIES OF DAILY LIVING: I Mod I S SBA CGA Min Mod Max Total NT Comments   BASIC ADLs:              Upper Body Bathing  [] [] [] [x] [] [] [] [] [] []    Lower Body Bathing [] [] [] [x] [] [] [] [] [] []    Toileting [] [] [] [x] [] [] [] [] [] []    Upper Body Dressing [] [] [] [x] [] [] [] [] [] []    Lower Body Dressing [] [] [] [x] [] [] [] [] [] []    Feeding [x] [] [] [] [] [] [] [] [] []    Grooming [] [] [] [x] [] [] [] [] [] []    Personal Device Care [] [] [] [] [] [] [] [] [] []    Functional Mobility [] [] [] [x] [] [] [] [] [] [] With RW   I=Independent, Mod I=Modified Independent, S=Supervision/Setup, SBA=Standby Assistance, CGA=Contact Guard Assistance, Min=Minimal Assistance, Mod=Moderate Assistance, Max=Maximal Assistance, Total=Total Assistance, NT=Not Tested    PLAN:     [address] [place] [address] of Care:  (1 treatment) for duration of hospital stay or until stated goals are met, whichever comes first.    ACUTE OCCUPATIONAL THERAPY GOALS:   (Developed with and agreed upon by patient and/or caregiver.)  Pt will be SBA (baseline)  with ADL's and ambulated short distances. PROBLEM LIST:   (Skilled intervention is medically necessary to address:)  Decreased Cognition   INTERVENTIONS PLANNED:  (Benefits and precautions of occupational therapy have been discussed with the patient.)  Self Care Training         TREATMENT:     EVALUATION: LOW COMPLEXITY: (Untimed Charge)    TREATMENT:   Co-Treatment PT/OT necessary due to patient's decreased overall endurance/tolerance levels, as well as need for high level skilled assistance to complete functional transfers/mobility and functional tasks  Self Care: (15 min): Procedure(s) (per grid) utilized to improve and/or restore self-care/home management as related to dressing, bathing, toileting, grooming, and functional mobility . Required minimal verbal and   cueing to facilitate activities of daily living skills and compensatory activities.       AFTER TREATMENT PRECAUTIONS: Alarm Activated, Bed/Chair Locked, Call light within reach, Chair, Needs within reach, and RN at bedside    INTERDISCIPLINARY COLLABORATION:  RN/ PCT, PT/ PTA, and OT/ VILLA    EDUCATION:       TOTAL TREATMENT DURATION

## 2022-09-07 ENCOUNTER — CARE COORDINATION (OUTPATIENT)
Dept: CARE COORDINATION | Facility: CLINIC | Age: 87
End: 2022-09-07

## 2022-09-07 NOTE — CARE COORDINATION
No GRUPO indicated at this time. Patient with dementia and resides at [facility] and receives assistance with ADL's. CTN to resolve GRUPO episode at this time.

## 2022-09-09 ENCOUNTER — TELEPHONE (OUTPATIENT)
Dept: INTERNAL MEDICINE CLINIC | Facility: CLINIC | Age: 87
End: 2022-09-09

## 2022-09-17 ASSESSMENT — ENCOUNTER SYMPTOMS
EYE REDNESS: 0
COLOR CHANGE: 0
BACK PAIN: 0
RHINORRHEA: 0
EYE DISCHARGE: 0
WHEEZING: 0
ABDOMINAL PAIN: 0
SHORTNESS OF BREATH: 1
NAUSEA: 0
VOMITING: 0
COUGH: 1
FACIAL SWELLING: 0

## 2022-09-17 NOTE — ED PROVIDER NOTES
[employer] Emergency Department Provider Note                   PCP:                No primary care provider on file. Age: 80 y.o. Sex: female       ICD-10-CM    1. Acute respiratory failure with hypoxia (HCC)  J96.01       2. Cough  R05.9           DISPOSITION Admitted 09/04/2022 10:49:03 PM       Discharge Medication List as of 9/6/2022  2:11 PM        START taking these medications    Details   oseltamivir (TAMIFLU) 75 MG capsule Take 1 capsule by mouth 2 times daily for 10 doses, Disp-10 capsule, R-0Print             Orders Placed This Encounter   Procedures    COVID-19, Rapid    Rapid influenza A/B antigens    XR CHEST 1 VIEW    XR PELVIS (1-2 VIEWS)    CBC with Auto Differential    Comprehensive Metabolic Panel    Lactic Acid    Troponin    Brain Natriuretic Peptide    Hemoglobin E2J    Basic Metabolic Panel w/ Reflex to MG    CBC with Auto Differential    Magnesium    Basic Metabolic Panel    Basic Metabolic Panel w/ Reflex to MG    Basic Metabolic Panel w/ Reflex to MG    Magnesium    Procalcitonin    Misc nursing order (specify)    OT eval and treat    POCT Glucose    POCT Glucose    POCT Glucose    POCT Glucose    POCT Glucose    POCT Glucose    EKG 12 Lead    Place in Observation Service    Discharge patient         Dat Boateng is a 80 y.o. female who presents to the Emergency Department with chief complaint of    Chief Complaint   Patient presents with    Cough      Chief complaint : Cough dyspnea    HISTORY OF PRESENT ILLNESS :  Location : Tightness chest    Quality : Nonproductive cough    Quantity : Constant    Timing : 2 days    Severity : Moderate    Context : COVID about 3 weeks ago    Alleviating / exacerbating factors : Worse with exertion    Associated Symptoms : No fevers    -------------------------------    SOCIAL HISTORY : , lives at assisted living center, non-smoker, nondrinker        Review of Systems   Constitutional:  Positive for fatigue.  Negative for chills and fever. HENT:  Negative for facial swelling and rhinorrhea. Eyes:  Negative for discharge and redness. Respiratory:  Positive for cough and shortness of breath. Negative for wheezing. Cardiovascular:  Negative for chest pain and palpitations. Gastrointestinal:  Negative for abdominal pain, nausea and vomiting. Endocrine: Negative for polydipsia and polyuria. Genitourinary:  Negative for difficulty urinating and dysuria. Musculoskeletal:  Negative for arthralgias and back pain. Skin:  Negative for color change and pallor. Neurological:  Negative for dizziness and headaches. All other systems reviewed and are negative. All other systems reviewed and are negative. Past Medical History:   Diagnosis Date    Broken bones     Diabetes ([address]     Ear problems     Hearing problem     History of mammogram 2015    History of Papanicolaou smear of cervix >5 years ago    Hypertension     Seasonal allergic rhinitis due to pollen 9/21/2020        Past Surgical History:   Procedure Laterality Date    COCHLEAR IMPLANT  02/21/2017    [address] [place], [place]    COLONOSCOPY  2008    ORTHOPEDIC SURGERY  2008    lumbar laminectomy; [place], [place]    TUBAL LIGATION          Family History   Problem Relation Age of Onset    Breast Cancer Sister     Heart Disease Maternal Grandmother     Colon Cancer Sister     Stroke Mother         Social Connections: Not on file        Allergies   Allergen Reactions    Donepezil Itching        Vitals signs and nursing note reviewed. No data found. Physical Exam  Vitals and nursing note reviewed. Constitutional:       General: She is not in acute distress. Appearance: Normal appearance. She is not ill-appearing, toxic-appearing or diaphoretic. HENT:      Head: Normocephalic and atraumatic. Right Ear: External ear normal.      Left Ear: External ear normal.      Nose: No rhinorrhea. Eyes:      General: No scleral icterus.         Right eye: No discharge. Left eye: No discharge. Extraocular Movements: Extraocular movements intact. Conjunctiva/sclera: Conjunctivae normal.   Cardiovascular:      Rate and Rhythm: Normal rate and regular rhythm. Pulmonary:      Effort: Pulmonary effort is normal. No respiratory distress. Breath sounds: Normal breath sounds. No wheezing or rales. Abdominal:      Palpations: Abdomen is soft. There is no fluid wave or pulsatile mass. Tenderness: There is no abdominal tenderness. There is no guarding or rebound. Musculoskeletal:         General: No deformity or signs of injury. Normal range of motion. Cervical back: Normal range of motion and neck supple. No rigidity. Skin:     General: Skin is warm and dry. Coloration: Skin is not jaundiced or pale. Neurological:      General: No focal deficit present. Mental Status: She is alert and oriented to person, place, and time. Psychiatric:         Mood and Affect: Mood normal.         Behavior: Behavior normal.         Thought Content: Thought content normal.        MDM  Number of Diagnoses or Management Options  Acute respiratory failure with hypoxia ([address]): new, needed workup  Cough: new, needed workup  Diagnosis management comments: COVID 3 weeks ago, new cough with dyspnea, chest x-ray clear, actually sounds pretty good, empiric neb given while awaiting test results.   Sats remained good at rest however now after ambulation did drop to 87 and 88%   Will admit to hospitalist service for hypoxemia       Amount and/or Complexity of Data Reviewed  Clinical lab tests: ordered and reviewed  Tests in the radiology section of CPT®: ordered and reviewed  Decide to obtain previous medical records or to obtain history from someone other than the patient: yes  Obtain history from someone other than the patient: yes (Daughter at bedside)  Discuss the patient with other providers: yes (Hospitalist)    Risk of Complications, Morbidity, and/or Mortality  Presenting problems: moderate  Diagnostic procedures: low  Management options: low    Patient Progress  Patient progress: improved      Procedures    Labs Reviewed   RAPID INFLUENZA A/B ANTIGENS - Abnormal; Notable for the following components:       Result Value    Influenza A Ag Positive (*)     All other components within normal limits   CBC WITH AUTO DIFFERENTIAL - Abnormal; Notable for the following components:    WBC 3.5 (*)     RBC 3.79 (*)     Hematocrit 34.8 (*)     Seg Neutrophils 38 (*)     Monocytes 15 (*)     Segs Absolute 1.3 (*)     All other components within normal limits   COMPREHENSIVE METABOLIC PANEL - Abnormal; Notable for the following components:    Glucose 142 (*)     Creatinine 1.13 (*)     GFR  59 (*)     GFR Non- 49 (*)     AST 70 (*)     Albumin 3.0 (*)     Globulin 4.1 (*)     Albumin/Globulin Ratio 0.7 (*)     All other components within normal limits   TROPONIN - Abnormal; Notable for the following components:    Troponin, High Sensitivity 21.5 (*)     All other components within normal limits   HEMOGLOBIN A1C - Abnormal; Notable for the following components:    Hemoglobin A1C 6.8 (*)     All other components within normal limits   BASIC METABOLIC PANEL W/ REFLEX TO MG FOR LOW K - Abnormal; Notable for the following components:    Potassium 2.7 (*)     Glucose 176 (*)     Creatinine 1.06 (*)     GFR Non- 52 (*)     All other components within normal limits   CBC WITH AUTO DIFFERENTIAL - Abnormal; Notable for the following components:    WBC 3.4 (*)     RBC 3.69 (*)     Hemoglobin 11.3 (*)     Hematocrit 34.4 (*)     Monocytes 15 (*)     All other components within normal limits   MAGNESIUM - Abnormal; Notable for the following components:    Magnesium 1.7 (*)     All other components within normal limits   BASIC METABOLIC PANEL - Abnormal; Notable for the following components:    Potassium 2.8 (*)     Anion Gap 2 (*) Glucose 123 (*)     All other components within normal limits   BASIC METABOLIC PANEL W/ REFLEX TO MG FOR LOW K - Abnormal; Notable for the following components:    Glucose 210 (*)     Creatinine 1.02 (*)     GFR Non- 55 (*)     All other components within normal limits   BASIC METABOLIC PANEL W/ REFLEX TO MG FOR LOW K - Abnormal; Notable for the following components:    Potassium 3.3 (*)     Glucose 160 (*)     Calcium 8.1 (*)     All other components within normal limits   POCT GLUCOSE - Abnormal; Notable for the following components:    POC Glucose 177 (*)     All other components within normal limits   POCT GLUCOSE - Abnormal; Notable for the following components:    POC Glucose 125 (*)     All other components within normal limits   POCT GLUCOSE - Abnormal; Notable for the following components:    POC Glucose 171 (*)     All other components within normal limits   POCT GLUCOSE - Abnormal; Notable for the following components:    POC Glucose 212 (*)     All other components within normal limits   POCT GLUCOSE - Abnormal; Notable for the following components:    POC Glucose 261 (*)     All other components within normal limits   POCT GLUCOSE - Abnormal; Notable for the following components:    POC Glucose 160 (*)     All other components within normal limits   COVID-19, RAPID   LACTIC ACID   LACTIC ACID   BRAIN NATRIURETIC PEPTIDE   MAGNESIUM   PROCALCITONIN   POCT GLUCOSE   POCT GLUCOSE   POCT GLUCOSE   POCT GLUCOSE   POCT GLUCOSE   POCT GLUCOSE        XR PELVIS (1-2 VIEWS)   Final Result      1. No evidence of acute pelvic fracture. XR CHEST 1 VIEW   Final Result   1. No acute process. Zeke Coma Scale  Eye Opening: Spontaneous  Best Verbal Response: Oriented  Best Motor Response: Obeys commands  Zeke Coma Scale Score: 15                     Voice dictation software was used during the making of this note.   This software is not perfect and grammatical and other typographical errors may be present. This note has not been completely proofread for errors.        Gianluca Boggs MD  09/17/22 5061

## 2022-10-06 PROBLEM — J10.1 INFLUENZA A: Status: RESOLVED | Noted: 2022-09-06 | Resolved: 2022-10-06

## 2024-03-16 ENCOUNTER — HOSPITAL ENCOUNTER (INPATIENT)
Age: 89
LOS: 4 days | Discharge: NURSING, REHAB OR LONG-TERM CARE | DRG: 392 | End: 2024-03-20
Attending: EMERGENCY MEDICINE | Admitting: STUDENT IN AN ORGANIZED HEALTH CARE EDUCATION/TRAINING PROGRAM
Payer: MEDICARE

## 2024-03-16 ENCOUNTER — APPOINTMENT (OUTPATIENT)
Dept: CT IMAGING | Age: 89
DRG: 392 | End: 2024-03-16
Payer: MEDICARE

## 2024-03-16 ENCOUNTER — APPOINTMENT (OUTPATIENT)
Dept: GENERAL RADIOLOGY | Age: 89
DRG: 392 | End: 2024-03-16
Payer: MEDICARE

## 2024-03-16 DIAGNOSIS — N39.0 URINARY TRACT INFECTION WITHOUT HEMATURIA, SITE UNSPECIFIED: ICD-10-CM

## 2024-03-16 DIAGNOSIS — K52.9 COLITIS: ICD-10-CM

## 2024-03-16 DIAGNOSIS — K63.89 COLON DISTENTION: ICD-10-CM

## 2024-03-16 DIAGNOSIS — E87.20 LACTIC ACIDOSIS: Primary | ICD-10-CM

## 2024-03-16 LAB
ALBUMIN SERPL-MCNC: 3.1 G/DL (ref 3.2–4.6)
ALBUMIN/GLOB SERPL: 0.7 (ref 0.4–1.6)
ALP SERPL-CCNC: 98 U/L (ref 50–136)
ALT SERPL-CCNC: 20 U/L (ref 12–65)
ANION GAP SERPL CALC-SCNC: 6 MMOL/L (ref 2–11)
AST SERPL-CCNC: 19 U/L (ref 15–37)
BACTERIA URNS QL MICRO: ABNORMAL /HPF
BASOPHILS # BLD: 0.1 K/UL (ref 0–0.2)
BASOPHILS NFR BLD: 1 % (ref 0–2)
BILIRUB SERPL-MCNC: 0.4 MG/DL (ref 0.2–1.1)
BUN SERPL-MCNC: 18 MG/DL (ref 8–23)
CALCIUM SERPL-MCNC: 9.3 MG/DL (ref 8.3–10.4)
CASTS URNS QL MICRO: 0 /LPF
CHLORIDE SERPL-SCNC: 106 MMOL/L (ref 103–113)
CO2 SERPL-SCNC: 29 MMOL/L (ref 21–32)
CREAT SERPL-MCNC: 1 MG/DL (ref 0.6–1)
CRYSTALS URNS QL MICRO: 0 /LPF
DIFFERENTIAL METHOD BLD: ABNORMAL
EKG ATRIAL RATE: 88 BPM
EKG DIAGNOSIS: NORMAL
EKG P AXIS: 92 DEGREES
EKG P-R INTERVAL: 174 MS
EKG Q-T INTERVAL: 364 MS
EKG QRS DURATION: 80 MS
EKG QTC CALCULATION (BAZETT): 441 MS
EKG R AXIS: 51 DEGREES
EKG T AXIS: 94 DEGREES
EKG VENTRICULAR RATE: 88 BPM
EOSINOPHIL # BLD: 0.4 K/UL (ref 0–0.8)
EOSINOPHIL NFR BLD: 7 % (ref 0.5–7.8)
EPI CELLS #/AREA URNS HPF: ABNORMAL /HPF
ERYTHROCYTE [DISTWIDTH] IN BLOOD BY AUTOMATED COUNT: 13.3 % (ref 11.9–14.6)
GLOBULIN SER CALC-MCNC: 4.6 G/DL (ref 2.8–4.5)
GLUCOSE BLD STRIP.AUTO-MCNC: 138 MG/DL (ref 65–100)
GLUCOSE SERPL-MCNC: 217 MG/DL (ref 65–100)
HCT VFR BLD AUTO: 37.1 % (ref 35.8–46.3)
HGB BLD-MCNC: 12.2 G/DL (ref 11.7–15.4)
IMM GRANULOCYTES # BLD AUTO: 0 K/UL (ref 0–0.5)
IMM GRANULOCYTES NFR BLD AUTO: 1 % (ref 0–5)
LACTATE SERPL-SCNC: 0.9 MMOL/L (ref 0.4–2)
LACTATE SERPL-SCNC: 1.3 MMOL/L (ref 0.4–2)
LACTATE SERPL-SCNC: 2.5 MMOL/L (ref 0.4–2)
LIPASE SERPL-CCNC: 183 U/L (ref 73–393)
LIPASE SERPL-CCNC: 188 U/L (ref 73–393)
LYMPHOCYTES # BLD: 1.7 K/UL (ref 0.5–4.6)
LYMPHOCYTES NFR BLD: 31 % (ref 13–44)
MCH RBC QN AUTO: 30.9 PG (ref 26.1–32.9)
MCHC RBC AUTO-ENTMCNC: 32.9 G/DL (ref 31.4–35)
MCV RBC AUTO: 93.9 FL (ref 82–102)
MONOCYTES # BLD: 0.7 K/UL (ref 0.1–1.3)
MONOCYTES NFR BLD: 13 % (ref 4–12)
MUCOUS THREADS URNS QL MICRO: 0 /LPF
NEUTS SEG # BLD: 2.7 K/UL (ref 1.7–8.2)
NEUTS SEG NFR BLD: 47 % (ref 43–78)
NRBC # BLD: 0 K/UL (ref 0–0.2)
OTHER OBSERVATIONS: ABNORMAL
PLATELET # BLD AUTO: 246 K/UL (ref 150–450)
PMV BLD AUTO: 9.8 FL (ref 9.4–12.3)
POTASSIUM SERPL-SCNC: 3.4 MMOL/L (ref 3.5–5.1)
PROT SERPL-MCNC: 7.7 G/DL (ref 6.3–8.2)
RBC # BLD AUTO: 3.95 M/UL (ref 4.05–5.2)
RBC #/AREA URNS HPF: ABNORMAL /HPF
SERVICE CMNT-IMP: ABNORMAL
SODIUM SERPL-SCNC: 141 MMOL/L (ref 136–146)
TROPONIN I SERPL HS-MCNC: 7 PG/ML (ref 0–14)
WBC # BLD AUTO: 5.5 K/UL (ref 4.3–11.1)
WBC URNS QL MICRO: >100 /HPF

## 2024-03-16 PROCEDURE — 83690 ASSAY OF LIPASE: CPT

## 2024-03-16 PROCEDURE — 93005 ELECTROCARDIOGRAM TRACING: CPT | Performed by: EMERGENCY MEDICINE

## 2024-03-16 PROCEDURE — 2700000000 HC OXYGEN THERAPY PER DAY

## 2024-03-16 PROCEDURE — 74177 CT ABD & PELVIS W/CONTRAST: CPT

## 2024-03-16 PROCEDURE — 83605 ASSAY OF LACTIC ACID: CPT

## 2024-03-16 PROCEDURE — 87086 URINE CULTURE/COLONY COUNT: CPT

## 2024-03-16 PROCEDURE — 85025 COMPLETE CBC W/AUTO DIFF WBC: CPT

## 2024-03-16 PROCEDURE — 71045 X-RAY EXAM CHEST 1 VIEW: CPT

## 2024-03-16 PROCEDURE — 96374 THER/PROPH/DIAG INJ IV PUSH: CPT

## 2024-03-16 PROCEDURE — 87186 SC STD MICRODIL/AGAR DIL: CPT

## 2024-03-16 PROCEDURE — 81015 MICROSCOPIC EXAM OF URINE: CPT

## 2024-03-16 PROCEDURE — 83036 HEMOGLOBIN GLYCOSYLATED A1C: CPT

## 2024-03-16 PROCEDURE — 84484 ASSAY OF TROPONIN QUANT: CPT

## 2024-03-16 PROCEDURE — 80053 COMPREHEN METABOLIC PANEL: CPT

## 2024-03-16 PROCEDURE — 6360000002 HC RX W HCPCS: Performed by: EMERGENCY MEDICINE

## 2024-03-16 PROCEDURE — 6360000004 HC RX CONTRAST MEDICATION: Performed by: EMERGENCY MEDICINE

## 2024-03-16 PROCEDURE — 2580000003 HC RX 258: Performed by: STUDENT IN AN ORGANIZED HEALTH CARE EDUCATION/TRAINING PROGRAM

## 2024-03-16 PROCEDURE — 36415 COLL VENOUS BLD VENIPUNCTURE: CPT

## 2024-03-16 PROCEDURE — 99285 EMERGENCY DEPT VISIT HI MDM: CPT

## 2024-03-16 PROCEDURE — 96361 HYDRATE IV INFUSION ADD-ON: CPT

## 2024-03-16 PROCEDURE — 1100000000 HC RM PRIVATE

## 2024-03-16 PROCEDURE — 82962 GLUCOSE BLOOD TEST: CPT

## 2024-03-16 PROCEDURE — 93010 ELECTROCARDIOGRAM REPORT: CPT | Performed by: INTERNAL MEDICINE

## 2024-03-16 PROCEDURE — 2580000003 HC RX 258: Performed by: EMERGENCY MEDICINE

## 2024-03-16 PROCEDURE — 6360000002 HC RX W HCPCS: Performed by: STUDENT IN AN ORGANIZED HEALTH CARE EDUCATION/TRAINING PROGRAM

## 2024-03-16 PROCEDURE — 81003 URINALYSIS AUTO W/O SCOPE: CPT

## 2024-03-16 PROCEDURE — 87088 URINE BACTERIA CULTURE: CPT

## 2024-03-16 RX ORDER — 0.9 % SODIUM CHLORIDE 0.9 %
1000 INTRAVENOUS SOLUTION INTRAVENOUS ONCE
Status: COMPLETED | OUTPATIENT
Start: 2024-03-16 | End: 2024-03-16

## 2024-03-16 RX ORDER — IBUPROFEN 200 MG
1 CAPSULE ORAL 2 TIMES DAILY
COMMUNITY

## 2024-03-16 RX ORDER — ACETAMINOPHEN 500 MG
1000 TABLET ORAL 2 TIMES DAILY
COMMUNITY

## 2024-03-16 RX ORDER — LANOLIN ALCOHOL/MO/W.PET/CERES
3 CREAM (GRAM) TOPICAL
COMMUNITY

## 2024-03-16 RX ORDER — CIPROFLOXACIN 2 MG/ML
400 INJECTION, SOLUTION INTRAVENOUS EVERY 12 HOURS
Status: DISCONTINUED | OUTPATIENT
Start: 2024-03-16 | End: 2024-03-19

## 2024-03-16 RX ORDER — METRONIDAZOLE 500 MG/100ML
500 INJECTION, SOLUTION INTRAVENOUS
Status: COMPLETED | OUTPATIENT
Start: 2024-03-16 | End: 2024-03-16

## 2024-03-16 RX ORDER — INSULIN LISPRO 100 [IU]/ML
0-4 INJECTION, SOLUTION INTRAVENOUS; SUBCUTANEOUS
Status: DISCONTINUED | OUTPATIENT
Start: 2024-03-16 | End: 2024-03-20 | Stop reason: HOSPADM

## 2024-03-16 RX ORDER — CETIRIZINE HYDROCHLORIDE 10 MG/1
10 TABLET ORAL
COMMUNITY

## 2024-03-16 RX ORDER — SODIUM CHLORIDE 9 MG/ML
INJECTION, SOLUTION INTRAVENOUS CONTINUOUS
Status: ACTIVE | OUTPATIENT
Start: 2024-03-16 | End: 2024-03-18

## 2024-03-16 RX ORDER — POLYETHYLENE GLYCOL 3350 17 G/17G
17 POWDER, FOR SOLUTION ORAL 2 TIMES DAILY
Status: ON HOLD | COMMUNITY
End: 2024-03-20

## 2024-03-16 RX ORDER — ONDANSETRON 2 MG/ML
4 INJECTION INTRAMUSCULAR; INTRAVENOUS EVERY 6 HOURS PRN
Status: DISCONTINUED | OUTPATIENT
Start: 2024-03-16 | End: 2024-03-20 | Stop reason: HOSPADM

## 2024-03-16 RX ORDER — MAGNESIUM SULFATE IN WATER 40 MG/ML
2000 INJECTION, SOLUTION INTRAVENOUS PRN
Status: DISCONTINUED | OUTPATIENT
Start: 2024-03-16 | End: 2024-03-20 | Stop reason: HOSPADM

## 2024-03-16 RX ORDER — POTASSIUM CHLORIDE 20 MEQ/1
40 TABLET, EXTENDED RELEASE ORAL PRN
Status: DISCONTINUED | OUTPATIENT
Start: 2024-03-16 | End: 2024-03-20 | Stop reason: HOSPADM

## 2024-03-16 RX ORDER — SENNA AND DOCUSATE SODIUM 50; 8.6 MG/1; MG/1
2 TABLET, FILM COATED ORAL
COMMUNITY

## 2024-03-16 RX ORDER — SODIUM CHLORIDE 0.9 % (FLUSH) 0.9 %
5-40 SYRINGE (ML) INJECTION EVERY 12 HOURS SCHEDULED
Status: DISCONTINUED | OUTPATIENT
Start: 2024-03-16 | End: 2024-03-20 | Stop reason: HOSPADM

## 2024-03-16 RX ORDER — INSULIN LISPRO 100 [IU]/ML
0-4 INJECTION, SOLUTION INTRAVENOUS; SUBCUTANEOUS NIGHTLY
Status: DISCONTINUED | OUTPATIENT
Start: 2024-03-16 | End: 2024-03-20 | Stop reason: HOSPADM

## 2024-03-16 RX ORDER — POTASSIUM CHLORIDE 7.45 MG/ML
10 INJECTION INTRAVENOUS PRN
Status: DISCONTINUED | OUTPATIENT
Start: 2024-03-16 | End: 2024-03-20 | Stop reason: HOSPADM

## 2024-03-16 RX ORDER — AMLODIPINE BESYLATE 10 MG/1
10 TABLET ORAL DAILY
COMMUNITY

## 2024-03-16 RX ORDER — METRONIDAZOLE 500 MG/100ML
500 INJECTION, SOLUTION INTRAVENOUS EVERY 8 HOURS
Status: DISCONTINUED | OUTPATIENT
Start: 2024-03-17 | End: 2024-03-20

## 2024-03-16 RX ORDER — SODIUM CHLORIDE 9 MG/ML
INJECTION, SOLUTION INTRAVENOUS PRN
Status: DISCONTINUED | OUTPATIENT
Start: 2024-03-16 | End: 2024-03-20 | Stop reason: HOSPADM

## 2024-03-16 RX ORDER — ACETAMINOPHEN 325 MG/1
650 TABLET ORAL EVERY 6 HOURS PRN
Status: DISCONTINUED | OUTPATIENT
Start: 2024-03-16 | End: 2024-03-20 | Stop reason: HOSPADM

## 2024-03-16 RX ORDER — ONDANSETRON 4 MG/1
4 TABLET, ORALLY DISINTEGRATING ORAL EVERY 8 HOURS PRN
Status: DISCONTINUED | OUTPATIENT
Start: 2024-03-16 | End: 2024-03-20 | Stop reason: HOSPADM

## 2024-03-16 RX ORDER — IBUPROFEN 600 MG/1
1 TABLET ORAL PRN
Status: DISCONTINUED | OUTPATIENT
Start: 2024-03-16 | End: 2024-03-20 | Stop reason: HOSPADM

## 2024-03-16 RX ORDER — DIVALPROEX SODIUM 125 MG/1
125 TABLET, DELAYED RELEASE ORAL 3 TIMES DAILY
COMMUNITY

## 2024-03-16 RX ORDER — HYDROXYZINE HYDROCHLORIDE 25 MG/1
25 TABLET, FILM COATED ORAL NIGHTLY PRN
Status: DISCONTINUED | OUTPATIENT
Start: 2024-03-16 | End: 2024-03-20 | Stop reason: HOSPADM

## 2024-03-16 RX ORDER — POLYETHYLENE GLYCOL 3350 17 G/17G
17 POWDER, FOR SOLUTION ORAL DAILY PRN
Status: DISCONTINUED | OUTPATIENT
Start: 2024-03-16 | End: 2024-03-20 | Stop reason: HOSPADM

## 2024-03-16 RX ORDER — ACETAMINOPHEN 650 MG/1
650 SUPPOSITORY RECTAL EVERY 6 HOURS PRN
Status: DISCONTINUED | OUTPATIENT
Start: 2024-03-16 | End: 2024-03-20 | Stop reason: HOSPADM

## 2024-03-16 RX ORDER — DEXTROSE MONOHYDRATE 100 MG/ML
INJECTION, SOLUTION INTRAVENOUS CONTINUOUS PRN
Status: DISCONTINUED | OUTPATIENT
Start: 2024-03-16 | End: 2024-03-20 | Stop reason: HOSPADM

## 2024-03-16 RX ORDER — SODIUM CHLORIDE 0.9 % (FLUSH) 0.9 %
5-40 SYRINGE (ML) INJECTION PRN
Status: DISCONTINUED | OUTPATIENT
Start: 2024-03-16 | End: 2024-03-20 | Stop reason: HOSPADM

## 2024-03-16 RX ORDER — ERGOCALCIFEROL 1.25 MG/1
50000 CAPSULE ORAL
COMMUNITY

## 2024-03-16 RX ADMIN — SODIUM CHLORIDE 1000 ML: 9 INJECTION, SOLUTION INTRAVENOUS at 15:41

## 2024-03-16 RX ADMIN — SODIUM CHLORIDE 1000 ML: 9 INJECTION, SOLUTION INTRAVENOUS at 18:50

## 2024-03-16 RX ADMIN — METRONIDAZOLE 500 MG: 500 INJECTION, SOLUTION INTRAVENOUS at 18:53

## 2024-03-16 RX ADMIN — CEFTRIAXONE 1000 MG: 1 INJECTION, POWDER, FOR SOLUTION INTRAMUSCULAR; INTRAVENOUS at 18:07

## 2024-03-16 RX ADMIN — SODIUM CHLORIDE: 9 INJECTION, SOLUTION INTRAVENOUS at 19:59

## 2024-03-16 RX ADMIN — CIPROFLOXACIN 400 MG: 400 INJECTION, SOLUTION INTRAVENOUS at 20:22

## 2024-03-16 RX ADMIN — IOPAMIDOL 100 ML: 755 INJECTION, SOLUTION INTRAVENOUS at 16:26

## 2024-03-16 RX ADMIN — SODIUM CHLORIDE, PRESERVATIVE FREE 10 ML: 5 INJECTION INTRAVENOUS at 20:29

## 2024-03-16 ASSESSMENT — LIFESTYLE VARIABLES
HOW OFTEN DO YOU HAVE A DRINK CONTAINING ALCOHOL: NEVER
HOW MANY STANDARD DRINKS CONTAINING ALCOHOL DO YOU HAVE ON A TYPICAL DAY: PATIENT DOES NOT DRINK

## 2024-03-16 ASSESSMENT — PAIN SCALES - GENERAL
PAINLEVEL_OUTOF10: 5
PAINLEVEL_OUTOF10: 0

## 2024-03-16 ASSESSMENT — PAIN - FUNCTIONAL ASSESSMENT: PAIN_FUNCTIONAL_ASSESSMENT: 0-10

## 2024-03-16 ASSESSMENT — PAIN DESCRIPTION - PAIN TYPE: TYPE: ACUTE PAIN

## 2024-03-16 ASSESSMENT — PAIN DESCRIPTION - LOCATION: LOCATION: ABDOMEN

## 2024-03-16 NOTE — ED PROVIDER NOTES
Emergency Department Provider Note       PCP: No primary care provider on file.   Age: 88 y.o.   Sex: female     DISPOSITION Decision To Admit 03/16/2024 06:14:41 PM       ICD-10-CM    1. Lactic acidosis  E87.20       2. Colon distention  K63.89       3. Colitis  K52.9       4. Urinary tract infection without hematuria, site unspecified  N39.0           Medical Decision Making       Patient is a 88-year-old female from [facility] with history of SAWYER, HTN, DM2 who apparently was sent here because of abnormal laboratory and/or x-ray.  It is unclear if patient had an ileus on plain x-ray or patient had abnormal laboratory testing.  Patient does have a history of dementia and has no complaints.      Differential diagnosis includes but is not limited to SAWYER, electrolyte abnormality, UTI, intestinal obstruction    Patient's physical exam is unremarkable except for some mild abdominal pain with palpation.    Patient WBC count is normal however lactic acid is elevated 2.5.  Patient CT scan shows a moderately distended colon containing fluid as well as a distended rectum as well as regions of sigmoid colonic wall thickening which could be secondary to colitis.  Given that patient has a lactic acidosis as well as colitis we will admit for IV antibiotics.     1 or more acute illnesses that pose a threat to life or bodily function.   Shared medical decision making was utilized in creating the patients health plan today.    I independently ordered and reviewed each unique test.  I reviewed external records: provider visit note from PCP.   The patients assessment required an independent historian: EMS.  The reason they were needed is dementia.  I interpreted the CT Scan A/P colonic distention.  My Independent EKG Interpretation: sinus rhythm, no evidence of arrhythmia and sinus rhythm with premature ventricular contractions      ST Segments:Normal ST segments - NO STEMI   Rate: 88  The patient was admitted and I have

## 2024-03-16 NOTE — ED NOTES
TRANSFER - OUT REPORT:    Verbal report given to Choco STYLES on Maryuri Calderón  being transferred to [address] [place] [address] for routine progression of patient care       Report consisted of patient's Situation, Background, Assessment and   Recommendations(SBAR).     Information from the following report(s) Nurse Handoff Report, Index, ED SBAR, MAR, and Recent Results was reviewed with the receiving nurse.    [place] Assessment:    Presents to emergency department  because of falls (Syncope, seizure, or loss of consciousness): No  Age > 70: Yes  Altered Mental Status, Intoxication with alcohol or substance confusion (Disorientation, impaired judgment, poor safety awaremess, or inability to follow instructions): Yes  Impaired Mobility: Ambulates or transfers with assistive devices or assistance; Unable to ambulate or transer.: Yes             Lines:   Peripheral IV 03/16/24 Distal;Right;Anterior Wrist (Active)   Site Assessment Clean, dry & intact 03/16/24 1541   Line Status Blood return noted 03/16/24 1541   Phlebitis Assessment No symptoms 03/16/24 1541   Infiltration Assessment 0 03/16/24 1541        Opportunity for questions and clarification was provided.      Patient transported with:  O2 @ 2lpm           Evie Dozier RN  03/16/24 2062

## 2024-03-16 NOTE — ED TRIAGE NOTES
Pt arrives via [facility] from [facility]. Facility called for abnormal labs, facility was unsure of what the abnormal lab was. Facility states possible abnormal lab could be an ileus from xray completed 3/14. Patient confused at baseline, dementia with psychotic disturbance. Patient very hard of hearing. Patient states abd pain and reports no BM for a few days

## 2024-03-16 NOTE — H&P
Hospitalist History and Physical   Admit Date:  3/16/2024  3:08 PM   Name:  Maryuri Calderón   Age:  88 y.o.  Sex:  female  :  1935   MRN:  [id number]   Room:  ER30/30    Presenting/Chief Complaint: Abnormal Lab     Reason(s) for Admission: Colitis [K52.9]     History of Present Illness:   Maryuri Calderón is a 88 y.o. female with medical history of diabetes type 2, hypertension, dementia who presented from [facility] secondary to abnormal labs and concern for ileus on x-ray.  Patient poor historian secondary to underlying dementia.  Daughter at bedside.  Daughter stated she had been complaining of some abdominal discomfort over the past few days.  Patient presented with elevated lactic acid of 2.5.  CT abdomen pelvis with moderately distended colon containing fluid as well as distended rectum as well as regions of the sigmoid colonic wall thickening which could be secondary to colitis.  Patient was started on IV antibiotics in the emergency department.  Glucose 217, WBC of 5.5, potassium 3.4.  Chest x-ray without acute abnormality.      Assessment & Plan:   Colitis  -As evidenced on CT abdomen pelvis as above  - Cipro  - Flagyl  - IV fluid  - GI consult  - N.p.o.    Type 2 diabetes  - A1c  - Sliding scale insulin  - Continue to monitor daily blood glucose  - Hypoglycemic protocol    Anxiety  - Continue home Atarax      PT/OT evals and PPD ordered?  Therapy   Diet: Diet NPO  VTE prophylaxis: SCD's   Code status: Prior      Non-peripheral Lines and Tubes (if present):             Hospital Problems:  Principal Problem:    Colitis  Resolved Problems:    * No resolved hospital problems. *        Objective:   Patient Vitals for the past 24 hrs:   Temp Pulse Resp BP SpO2   24 1901 -- 84 20 (!) 147/60 100 %   24 1846 -- 81 19 121/66 93 %   24 1815 -- -- -- -- 100 %   24 1800 -- 89 16 94/75 --   24 1745 -- 91 18 (!) 140/67 --   24 1730 -- 88 19 (!) 143/63 --   24 1715 -- 90

## 2024-03-17 ENCOUNTER — ANESTHESIA EVENT (OUTPATIENT)
Dept: ENDOSCOPY | Age: 89
DRG: 392 | End: 2024-03-17
Payer: MEDICARE

## 2024-03-17 PROBLEM — F03.90 DEMENTIA (HCC): Status: ACTIVE | Noted: 2024-03-17

## 2024-03-17 LAB
ALBUMIN SERPL-MCNC: 2.8 G/DL (ref 3.2–4.6)
ALBUMIN/GLOB SERPL: 0.7 (ref 0.4–1.6)
ALP SERPL-CCNC: 88 U/L (ref 50–136)
ALT SERPL-CCNC: 18 U/L (ref 12–65)
ANION GAP SERPL CALC-SCNC: 3 MMOL/L (ref 2–11)
AST SERPL-CCNC: 26 U/L (ref 15–37)
BASOPHILS # BLD: 0 K/UL (ref 0–0.2)
BASOPHILS NFR BLD: 0 % (ref 0–2)
BILIRUB SERPL-MCNC: 0.4 MG/DL (ref 0.2–1.1)
BUN SERPL-MCNC: 8 MG/DL (ref 8–23)
CALCIUM SERPL-MCNC: 8.6 MG/DL (ref 8.3–10.4)
CHLORIDE SERPL-SCNC: 110 MMOL/L (ref 103–113)
CO2 SERPL-SCNC: 28 MMOL/L (ref 21–32)
CREAT SERPL-MCNC: 0.7 MG/DL (ref 0.6–1)
DIFFERENTIAL METHOD BLD: ABNORMAL
EOSINOPHIL # BLD: 0.5 K/UL (ref 0–0.8)
EOSINOPHIL NFR BLD: 8 % (ref 0.5–7.8)
ERYTHROCYTE [DISTWIDTH] IN BLOOD BY AUTOMATED COUNT: 13.2 % (ref 11.9–14.6)
EST. AVERAGE GLUCOSE BLD GHB EST-MCNC: 148 MG/DL
GLOBULIN SER CALC-MCNC: 4.1 G/DL (ref 2.8–4.5)
GLUCOSE BLD STRIP.AUTO-MCNC: 119 MG/DL (ref 65–100)
GLUCOSE BLD STRIP.AUTO-MCNC: 175 MG/DL (ref 65–100)
GLUCOSE BLD STRIP.AUTO-MCNC: 310 MG/DL (ref 65–100)
GLUCOSE BLD STRIP.AUTO-MCNC: 97 MG/DL (ref 65–100)
GLUCOSE SERPL-MCNC: 147 MG/DL (ref 65–100)
HBA1C MFR BLD: 6.8 % (ref 4.8–5.6)
HCT VFR BLD AUTO: 33.3 % (ref 35.8–46.3)
HGB BLD-MCNC: 11 G/DL (ref 11.7–15.4)
IMM GRANULOCYTES # BLD AUTO: 0 K/UL (ref 0–0.5)
IMM GRANULOCYTES NFR BLD AUTO: 0 % (ref 0–5)
LYMPHOCYTES # BLD: 1.4 K/UL (ref 0.5–4.6)
LYMPHOCYTES NFR BLD: 23 % (ref 13–44)
MCH RBC QN AUTO: 30.5 PG (ref 26.1–32.9)
MCHC RBC AUTO-ENTMCNC: 33 G/DL (ref 31.4–35)
MCV RBC AUTO: 92.2 FL (ref 82–102)
MONOCYTES # BLD: 0.8 K/UL (ref 0.1–1.3)
MONOCYTES NFR BLD: 13 % (ref 4–12)
NEUTS SEG # BLD: 3.4 K/UL (ref 1.7–8.2)
NEUTS SEG NFR BLD: 56 % (ref 43–78)
NRBC # BLD: 0 K/UL (ref 0–0.2)
PLATELET # BLD AUTO: 199 K/UL (ref 150–450)
PMV BLD AUTO: 9.9 FL (ref 9.4–12.3)
POTASSIUM SERPL-SCNC: 3.9 MMOL/L (ref 3.5–5.1)
PROT SERPL-MCNC: 6.9 G/DL (ref 6.3–8.2)
RBC # BLD AUTO: 3.61 M/UL (ref 4.05–5.2)
SERVICE CMNT-IMP: ABNORMAL
SERVICE CMNT-IMP: NORMAL
SODIUM SERPL-SCNC: 141 MMOL/L (ref 136–146)
WBC # BLD AUTO: 6 K/UL (ref 4.3–11.1)

## 2024-03-17 PROCEDURE — 94760 N-INVAS EAR/PLS OXIMETRY 1: CPT

## 2024-03-17 PROCEDURE — 1100000000 HC RM PRIVATE

## 2024-03-17 PROCEDURE — 80053 COMPREHEN METABOLIC PANEL: CPT

## 2024-03-17 PROCEDURE — 6360000002 HC RX W HCPCS: Performed by: STUDENT IN AN ORGANIZED HEALTH CARE EDUCATION/TRAINING PROGRAM

## 2024-03-17 PROCEDURE — 85025 COMPLETE CBC W/AUTO DIFF WBC: CPT

## 2024-03-17 PROCEDURE — 6360000002 HC RX W HCPCS: Performed by: HOSPITALIST

## 2024-03-17 PROCEDURE — 82962 GLUCOSE BLOOD TEST: CPT

## 2024-03-17 PROCEDURE — 2700000000 HC OXYGEN THERAPY PER DAY

## 2024-03-17 PROCEDURE — 6370000000 HC RX 637 (ALT 250 FOR IP): Performed by: INTERNAL MEDICINE

## 2024-03-17 PROCEDURE — 2580000003 HC RX 258: Performed by: HOSPITALIST

## 2024-03-17 PROCEDURE — 6370000000 HC RX 637 (ALT 250 FOR IP): Performed by: STUDENT IN AN ORGANIZED HEALTH CARE EDUCATION/TRAINING PROGRAM

## 2024-03-17 PROCEDURE — 36415 COLL VENOUS BLD VENIPUNCTURE: CPT

## 2024-03-17 PROCEDURE — 2580000003 HC RX 258: Performed by: STUDENT IN AN ORGANIZED HEALTH CARE EDUCATION/TRAINING PROGRAM

## 2024-03-17 RX ORDER — SENNA AND DOCUSATE SODIUM 50; 8.6 MG/1; MG/1
2 TABLET, FILM COATED ORAL
Status: DISCONTINUED | OUTPATIENT
Start: 2024-03-17 | End: 2024-03-20 | Stop reason: HOSPADM

## 2024-03-17 RX ORDER — LANOLIN ALCOHOL/MO/W.PET/CERES
3 CREAM (GRAM) TOPICAL
Status: DISCONTINUED | OUTPATIENT
Start: 2024-03-17 | End: 2024-03-20 | Stop reason: HOSPADM

## 2024-03-17 RX ORDER — POTASSIUM CHLORIDE 750 MG/1
10 TABLET, EXTENDED RELEASE ORAL DAILY
Status: DISCONTINUED | OUTPATIENT
Start: 2024-03-17 | End: 2024-03-20

## 2024-03-17 RX ORDER — DIVALPROEX SODIUM 125 MG/1
125 TABLET, DELAYED RELEASE ORAL 3 TIMES DAILY
Status: DISCONTINUED | OUTPATIENT
Start: 2024-03-17 | End: 2024-03-20 | Stop reason: HOSPADM

## 2024-03-17 RX ORDER — VITAMIN B COMPLEX
2000 TABLET ORAL DAILY
Status: DISCONTINUED | OUTPATIENT
Start: 2024-03-17 | End: 2024-03-20 | Stop reason: HOSPADM

## 2024-03-17 RX ORDER — NALOXONE HYDROCHLORIDE 0.4 MG/ML
INJECTION, SOLUTION INTRAMUSCULAR; INTRAVENOUS; SUBCUTANEOUS PRN
Status: CANCELLED | OUTPATIENT
Start: 2024-03-17

## 2024-03-17 RX ORDER — MEMANTINE HYDROCHLORIDE 5 MG/1
10 TABLET ORAL 2 TIMES DAILY
Status: DISCONTINUED | OUTPATIENT
Start: 2024-03-17 | End: 2024-03-20 | Stop reason: HOSPADM

## 2024-03-17 RX ORDER — NALOXONE HYDROCHLORIDE 0.4 MG/ML
0.4 INJECTION, SOLUTION INTRAMUSCULAR; INTRAVENOUS; SUBCUTANEOUS PRN
Status: DISCONTINUED | OUTPATIENT
Start: 2024-03-17 | End: 2024-03-20 | Stop reason: HOSPADM

## 2024-03-17 RX ORDER — MORPHINE SULFATE 2 MG/ML
2 INJECTION, SOLUTION INTRAMUSCULAR; INTRAVENOUS EVERY 4 HOURS PRN
Status: DISCONTINUED | OUTPATIENT
Start: 2024-03-17 | End: 2024-03-20 | Stop reason: HOSPADM

## 2024-03-17 RX ORDER — CLONIDINE HYDROCHLORIDE 0.1 MG/1
0.1 TABLET ORAL EVERY 4 HOURS PRN
Status: DISCONTINUED | OUTPATIENT
Start: 2024-03-17 | End: 2024-03-20 | Stop reason: HOSPADM

## 2024-03-17 RX ORDER — FUROSEMIDE 20 MG/1
20 TABLET ORAL DAILY
Status: DISCONTINUED | OUTPATIENT
Start: 2024-03-17 | End: 2024-03-20 | Stop reason: HOSPADM

## 2024-03-17 RX ORDER — ASPIRIN 325 MG
325 TABLET, DELAYED RELEASE (ENTERIC COATED) ORAL DAILY
Status: DISCONTINUED | OUTPATIENT
Start: 2024-03-18 | End: 2024-03-20 | Stop reason: HOSPADM

## 2024-03-17 RX ORDER — AMLODIPINE BESYLATE 10 MG/1
10 TABLET ORAL DAILY
Status: DISCONTINUED | OUTPATIENT
Start: 2024-03-17 | End: 2024-03-20 | Stop reason: HOSPADM

## 2024-03-17 RX ADMIN — AMLODIPINE BESYLATE 10 MG: 10 TABLET ORAL at 16:31

## 2024-03-17 RX ADMIN — ZIPRASIDONE MESYLATE 10 MG: 20 INJECTION, POWDER, LYOPHILIZED, FOR SOLUTION INTRAMUSCULAR at 20:27

## 2024-03-17 RX ADMIN — METRONIDAZOLE 500 MG: 500 INJECTION, SOLUTION INTRAVENOUS at 03:33

## 2024-03-17 RX ADMIN — SODIUM CHLORIDE: 9 INJECTION, SOLUTION INTRAVENOUS at 00:54

## 2024-03-17 RX ADMIN — CIPROFLOXACIN 400 MG: 400 INJECTION, SOLUTION INTRAVENOUS at 08:15

## 2024-03-17 RX ADMIN — HYDROXYZINE HYDROCHLORIDE 25 MG: 25 TABLET, FILM COATED ORAL at 00:45

## 2024-03-17 RX ADMIN — METRONIDAZOLE 500 MG: 500 INJECTION, SOLUTION INTRAVENOUS at 12:18

## 2024-03-17 RX ADMIN — CIPROFLOXACIN 400 MG: 400 INJECTION, SOLUTION INTRAVENOUS at 21:39

## 2024-03-17 RX ADMIN — VITAMIN D, TAB 1000IU (100/BT) 2000 UNITS: 25 TAB at 16:32

## 2024-03-17 RX ADMIN — FUROSEMIDE 20 MG: 20 TABLET ORAL at 16:30

## 2024-03-17 RX ADMIN — DIVALPROEX SODIUM 125 MG: 125 TABLET, DELAYED RELEASE ORAL at 16:32

## 2024-03-17 RX ADMIN — POTASSIUM CHLORIDE 10 MEQ: 750 TABLET, EXTENDED RELEASE ORAL at 16:31

## 2024-03-17 RX ADMIN — MORPHINE SULFATE 2 MG: 2 INJECTION, SOLUTION INTRAMUSCULAR; INTRAVENOUS at 19:59

## 2024-03-17 RX ADMIN — METRONIDAZOLE 500 MG: 500 INJECTION, SOLUTION INTRAVENOUS at 20:34

## 2024-03-17 RX ADMIN — INSULIN LISPRO 3 UNITS: 100 INJECTION, SOLUTION INTRAVENOUS; SUBCUTANEOUS at 12:13

## 2024-03-17 ASSESSMENT — PAIN SCALES - GENERAL
PAINLEVEL_OUTOF10: 0
PAINLEVEL_OUTOF10: 7
PAINLEVEL_OUTOF10: 0

## 2024-03-17 ASSESSMENT — PAIN DESCRIPTION - LOCATION: LOCATION: ABDOMEN

## 2024-03-17 ASSESSMENT — PAIN DESCRIPTION - ORIENTATION: ORIENTATION: ANTERIOR

## 2024-03-17 ASSESSMENT — PAIN DESCRIPTION - DESCRIPTORS: DESCRIPTORS: SORE

## 2024-03-17 NOTE — CONSULTS
No rashes.  Normal coloration.   Warm and dry.    Neuro:             CN II-XII grossly intact.  Sensation intact.  Demented  Psych:             Pleasantly confused.  Normal mood    Assessment and Plan:   #Sigmoid wall thickening/colonic distention:   Flex sig tomorrow after 2 tap water enemas    Darlene Bender MD  [facility]

## 2024-03-18 ENCOUNTER — ANESTHESIA (OUTPATIENT)
Dept: ENDOSCOPY | Age: 89
DRG: 392 | End: 2024-03-18
Payer: MEDICARE

## 2024-03-18 PROBLEM — N39.0 E-COLI UTI: Status: ACTIVE | Noted: 2024-03-18

## 2024-03-18 PROBLEM — B96.20 E-COLI UTI: Status: ACTIVE | Noted: 2024-03-18

## 2024-03-18 LAB
ALBUMIN SERPL-MCNC: 2.6 G/DL (ref 3.2–4.6)
ALBUMIN/GLOB SERPL: 0.7 (ref 0.4–1.6)
ALP SERPL-CCNC: 82 U/L (ref 50–136)
ALT SERPL-CCNC: 15 U/L (ref 12–65)
ANION GAP SERPL CALC-SCNC: 2 MMOL/L (ref 2–11)
AST SERPL-CCNC: 14 U/L (ref 15–37)
BACTERIA SPEC CULT: ABNORMAL
BASOPHILS # BLD: 0 K/UL (ref 0–0.2)
BASOPHILS NFR BLD: 1 % (ref 0–2)
BILIRUB SERPL-MCNC: 0.5 MG/DL (ref 0.2–1.1)
BILIRUB UR QL: NEGATIVE
BILIRUB UR QL: NEGATIVE
BUN SERPL-MCNC: 7 MG/DL (ref 8–23)
CALCIUM SERPL-MCNC: 8.7 MG/DL (ref 8.3–10.4)
CHLORIDE SERPL-SCNC: 107 MMOL/L (ref 103–113)
CO2 SERPL-SCNC: 31 MMOL/L (ref 21–32)
CREAT SERPL-MCNC: 0.7 MG/DL (ref 0.6–1)
DIFFERENTIAL METHOD BLD: ABNORMAL
EOSINOPHIL # BLD: 0.3 K/UL (ref 0–0.8)
EOSINOPHIL NFR BLD: 6 % (ref 0.5–7.8)
ERYTHROCYTE [DISTWIDTH] IN BLOOD BY AUTOMATED COUNT: 13.1 % (ref 11.9–14.6)
GLOBULIN SER CALC-MCNC: 3.9 G/DL (ref 2.8–4.5)
GLUCOSE BLD STRIP.AUTO-MCNC: 121 MG/DL (ref 65–100)
GLUCOSE BLD STRIP.AUTO-MCNC: 211 MG/DL (ref 65–100)
GLUCOSE BLD STRIP.AUTO-MCNC: 99 MG/DL (ref 65–100)
GLUCOSE SERPL-MCNC: 135 MG/DL (ref 65–100)
GLUCOSE UR QL STRIP.AUTO: NEGATIVE MG/DL
GLUCOSE UR QL STRIP.AUTO: NEGATIVE MG/DL
HCT VFR BLD AUTO: 32.7 % (ref 35.8–46.3)
HGB BLD-MCNC: 10.6 G/DL (ref 11.7–15.4)
IMM GRANULOCYTES # BLD AUTO: 0 K/UL (ref 0–0.5)
IMM GRANULOCYTES NFR BLD AUTO: 0 % (ref 0–5)
KETONES UR-MCNC: NEGATIVE MG/DL
KETONES UR-MCNC: NEGATIVE MG/DL
LEUKOCYTE ESTERASE UR QL STRIP: ABNORMAL
LEUKOCYTE ESTERASE UR QL STRIP: ABNORMAL
LYMPHOCYTES # BLD: 1.1 K/UL (ref 0.5–4.6)
LYMPHOCYTES NFR BLD: 23 % (ref 13–44)
MCH RBC QN AUTO: 30.4 PG (ref 26.1–32.9)
MCHC RBC AUTO-ENTMCNC: 32.4 G/DL (ref 31.4–35)
MCV RBC AUTO: 93.7 FL (ref 82–102)
MONOCYTES # BLD: 0.6 K/UL (ref 0.1–1.3)
MONOCYTES NFR BLD: 13 % (ref 4–12)
NEUTS SEG # BLD: 2.7 K/UL (ref 1.7–8.2)
NEUTS SEG NFR BLD: 57 % (ref 43–78)
NITRITE UR QL: NEGATIVE
NITRITE UR QL: NEGATIVE
NRBC # BLD: 0 K/UL (ref 0–0.2)
PH UR: 5.5 (ref 5–9)
PH UR: 5.5 (ref 5–9)
PLATELET # BLD AUTO: 200 K/UL (ref 150–450)
PMV BLD AUTO: 9.8 FL (ref 9.4–12.3)
POTASSIUM SERPL-SCNC: 3.6 MMOL/L (ref 3.5–5.1)
PROT SERPL-MCNC: 6.5 G/DL (ref 6.3–8.2)
PROT UR QL: ABNORMAL MG/DL
PROT UR QL: ABNORMAL MG/DL
RBC # BLD AUTO: 3.49 M/UL (ref 4.05–5.2)
RBC # UR STRIP: ABNORMAL
RBC # UR STRIP: ABNORMAL
SERVICE CMNT-IMP: ABNORMAL
SERVICE CMNT-IMP: NORMAL
SODIUM SERPL-SCNC: 140 MMOL/L (ref 136–146)
SP GR UR: 1.02 (ref 1–1.02)
SP GR UR: 1.02 (ref 1–1.02)
UROBILINOGEN UR QL: 0.2 EU/DL (ref 0.2–1)
UROBILINOGEN UR QL: 0.2 EU/DL (ref 0.2–1)
WBC # BLD AUTO: 4.8 K/UL (ref 4.3–11.1)

## 2024-03-18 PROCEDURE — 2500000003 HC RX 250 WO HCPCS

## 2024-03-18 PROCEDURE — 2500000003 HC RX 250 WO HCPCS: Performed by: INTERNAL MEDICINE

## 2024-03-18 PROCEDURE — 36415 COLL VENOUS BLD VENIPUNCTURE: CPT

## 2024-03-18 PROCEDURE — 2580000003 HC RX 258: Performed by: HOSPITALIST

## 2024-03-18 PROCEDURE — 6360000002 HC RX W HCPCS: Performed by: HOSPITALIST

## 2024-03-18 PROCEDURE — 2580000003 HC RX 258: Performed by: STUDENT IN AN ORGANIZED HEALTH CARE EDUCATION/TRAINING PROGRAM

## 2024-03-18 PROCEDURE — 2709999900 HC NON-CHARGEABLE SUPPLY: Performed by: STUDENT IN AN ORGANIZED HEALTH CARE EDUCATION/TRAINING PROGRAM

## 2024-03-18 PROCEDURE — 6370000000 HC RX 637 (ALT 250 FOR IP): Performed by: INTERNAL MEDICINE

## 2024-03-18 PROCEDURE — 0DJD8ZZ INSPECTION OF LOWER INTESTINAL TRACT, VIA NATURAL OR ARTIFICIAL OPENING ENDOSCOPIC: ICD-10-PCS | Performed by: STUDENT IN AN ORGANIZED HEALTH CARE EDUCATION/TRAINING PROGRAM

## 2024-03-18 PROCEDURE — 82962 GLUCOSE BLOOD TEST: CPT

## 2024-03-18 PROCEDURE — 3609008400 HC SIGMOIDOSCOPY DIAGNOSTIC: Performed by: STUDENT IN AN ORGANIZED HEALTH CARE EDUCATION/TRAINING PROGRAM

## 2024-03-18 PROCEDURE — 1100000000 HC RM PRIVATE

## 2024-03-18 PROCEDURE — 6360000002 HC RX W HCPCS

## 2024-03-18 PROCEDURE — 80053 COMPREHEN METABOLIC PANEL: CPT

## 2024-03-18 PROCEDURE — 85025 COMPLETE CBC W/AUTO DIFF WBC: CPT

## 2024-03-18 PROCEDURE — 2580000003 HC RX 258: Performed by: ANESTHESIOLOGY

## 2024-03-18 PROCEDURE — 7100000010 HC PHASE II RECOVERY - FIRST 15 MIN: Performed by: STUDENT IN AN ORGANIZED HEALTH CARE EDUCATION/TRAINING PROGRAM

## 2024-03-18 PROCEDURE — 6360000002 HC RX W HCPCS: Performed by: STUDENT IN AN ORGANIZED HEALTH CARE EDUCATION/TRAINING PROGRAM

## 2024-03-18 PROCEDURE — 3700000000 HC ANESTHESIA ATTENDED CARE: Performed by: STUDENT IN AN ORGANIZED HEALTH CARE EDUCATION/TRAINING PROGRAM

## 2024-03-18 PROCEDURE — 7100000011 HC PHASE II RECOVERY - ADDTL 15 MIN: Performed by: STUDENT IN AN ORGANIZED HEALTH CARE EDUCATION/TRAINING PROGRAM

## 2024-03-18 PROCEDURE — 92610 EVALUATE SWALLOWING FUNCTION: CPT

## 2024-03-18 RX ORDER — SODIUM CHLORIDE, SODIUM LACTATE, POTASSIUM CHLORIDE, CALCIUM CHLORIDE 600; 310; 30; 20 MG/100ML; MG/100ML; MG/100ML; MG/100ML
INJECTION, SOLUTION INTRAVENOUS CONTINUOUS
Status: DISCONTINUED | OUTPATIENT
Start: 2024-03-18 | End: 2024-03-19

## 2024-03-18 RX ORDER — SODIUM CHLORIDE 9 MG/ML
INJECTION, SOLUTION INTRAVENOUS PRN
Status: DISCONTINUED | OUTPATIENT
Start: 2024-03-18 | End: 2024-03-18 | Stop reason: HOSPADM

## 2024-03-18 RX ORDER — SODIUM CHLORIDE 9 MG/ML
INJECTION, SOLUTION INTRAVENOUS CONTINUOUS
Status: DISCONTINUED | OUTPATIENT
Start: 2024-03-18 | End: 2024-03-19

## 2024-03-18 RX ORDER — SODIUM CHLORIDE 0.9 % (FLUSH) 0.9 %
5-40 SYRINGE (ML) INJECTION EVERY 12 HOURS SCHEDULED
Status: DISCONTINUED | OUTPATIENT
Start: 2024-03-18 | End: 2024-03-18 | Stop reason: HOSPADM

## 2024-03-18 RX ORDER — LIDOCAINE HYDROCHLORIDE 20 MG/ML
INJECTION, SOLUTION EPIDURAL; INFILTRATION; INTRACAUDAL; PERINEURAL PRN
Status: DISCONTINUED | OUTPATIENT
Start: 2024-03-18 | End: 2024-03-18 | Stop reason: SDUPTHER

## 2024-03-18 RX ORDER — LIDOCAINE HYDROCHLORIDE 10 MG/ML
1 INJECTION, SOLUTION INFILTRATION; PERINEURAL
Status: DISCONTINUED | OUTPATIENT
Start: 2024-03-18 | End: 2024-03-18 | Stop reason: HOSPADM

## 2024-03-18 RX ORDER — PROPOFOL 10 MG/ML
INJECTION, EMULSION INTRAVENOUS PRN
Status: DISCONTINUED | OUTPATIENT
Start: 2024-03-18 | End: 2024-03-18 | Stop reason: SDUPTHER

## 2024-03-18 RX ORDER — SODIUM CHLORIDE 0.9 % (FLUSH) 0.9 %
5-40 SYRINGE (ML) INJECTION PRN
Status: DISCONTINUED | OUTPATIENT
Start: 2024-03-18 | End: 2024-03-18 | Stop reason: HOSPADM

## 2024-03-18 RX ADMIN — TUBERCULIN PURIFIED PROTEIN DERIVATIVE 5 UNITS: 5 INJECTION, SOLUTION INTRADERMAL at 13:55

## 2024-03-18 RX ADMIN — SODIUM CHLORIDE, POTASSIUM CHLORIDE, SODIUM LACTATE AND CALCIUM CHLORIDE: 600; 310; 30; 20 INJECTION, SOLUTION INTRAVENOUS at 11:01

## 2024-03-18 RX ADMIN — SODIUM CHLORIDE: 9 INJECTION, SOLUTION INTRAVENOUS at 10:23

## 2024-03-18 RX ADMIN — CIPROFLOXACIN 400 MG: 400 INJECTION, SOLUTION INTRAVENOUS at 21:21

## 2024-03-18 RX ADMIN — PROPOFOL 20 MG: 10 INJECTION, EMULSION INTRAVENOUS at 11:35

## 2024-03-18 RX ADMIN — LIDOCAINE HYDROCHLORIDE 40 MG: 20 INJECTION, SOLUTION EPIDURAL; INFILTRATION; INTRACAUDAL; PERINEURAL at 11:33

## 2024-03-18 RX ADMIN — DOCUSATE SODIUM 50 MG AND SENNOSIDES 8.6 MG 2 TABLET: 8.6; 5 TABLET, FILM COATED ORAL at 21:05

## 2024-03-18 RX ADMIN — DIVALPROEX SODIUM 125 MG: 125 TABLET, DELAYED RELEASE ORAL at 21:05

## 2024-03-18 RX ADMIN — SODIUM CHLORIDE: 9 INJECTION, SOLUTION INTRAVENOUS at 13:18

## 2024-03-18 RX ADMIN — MORPHINE SULFATE 2 MG: 2 INJECTION, SOLUTION INTRAMUSCULAR; INTRAVENOUS at 16:47

## 2024-03-18 RX ADMIN — SODIUM CHLORIDE, PRESERVATIVE FREE 10 ML: 5 INJECTION INTRAVENOUS at 21:23

## 2024-03-18 RX ADMIN — DIVALPROEX SODIUM 125 MG: 125 TABLET, DELAYED RELEASE ORAL at 13:53

## 2024-03-18 RX ADMIN — Medication 3 MG: at 21:05

## 2024-03-18 RX ADMIN — SODIUM CHLORIDE: 9 INJECTION, SOLUTION INTRAVENOUS at 23:35

## 2024-03-18 RX ADMIN — METRONIDAZOLE 500 MG: 500 INJECTION, SOLUTION INTRAVENOUS at 21:05

## 2024-03-18 RX ADMIN — PROPOFOL 20 MG: 10 INJECTION, EMULSION INTRAVENOUS at 11:33

## 2024-03-18 RX ADMIN — PROPOFOL 100 MCG/KG/MIN: 10 INJECTION, EMULSION INTRAVENOUS at 11:34

## 2024-03-18 RX ADMIN — MEMANTINE 10 MG: 5 TABLET ORAL at 21:43

## 2024-03-18 RX ADMIN — CIPROFLOXACIN 400 MG: 400 INJECTION, SOLUTION INTRAVENOUS at 08:34

## 2024-03-18 RX ADMIN — METRONIDAZOLE 500 MG: 500 INJECTION, SOLUTION INTRAVENOUS at 04:17

## 2024-03-18 ASSESSMENT — PAIN DESCRIPTION - ORIENTATION: ORIENTATION: RIGHT;ANTERIOR

## 2024-03-18 ASSESSMENT — PAIN DESCRIPTION - LOCATION: LOCATION: ABDOMEN

## 2024-03-18 ASSESSMENT — PAIN SCALES - GENERAL
PAINLEVEL_OUTOF10: 7
PAINLEVEL_OUTOF10: 0

## 2024-03-18 ASSESSMENT — PAIN - FUNCTIONAL ASSESSMENT: PAIN_FUNCTIONAL_ASSESSMENT: NONE - DENIES PAIN

## 2024-03-18 NOTE — ANESTHESIA POSTPROCEDURE EVALUATION
Department of Anesthesiology  Postprocedure Note    Patient: Maryuri Calderón  MRN: [id number]  YOB: 1935  Date of evaluation: 3/18/2024    Procedure Summary       Date: 03/18/24 Room / Location: [facility] ENDO 04 / [facility] ENDOSCOPY    Anesthesia Start: 1131 Anesthesia Stop: 1148    Procedure: SIGMOIDOSCOPY DIAGNOSTIC FLEXIBLE Diagnosis:       Sigmoid thickening      Colon distention      (Sigmoid thickening [K63.9])      (Colon distention [K63.89])    Surgeons: Darlene Bender MD Responsible Provider: Shay Morales DO    Anesthesia Type: TIVA ASA Status: 3            Anesthesia Type: No value filed.    Julia Phase I: Julia Score: 9    Julia Phase II: Julia Score: 10    Anesthesia Post Evaluation    Patient location during evaluation: PACU  Level of consciousness: awake and alert  Airway patency: patent  Nausea & Vomiting: no nausea  Cardiovascular status: hemodynamically stable  Respiratory status: acceptable  Hydration status: euvolemic  Pain management: satisfactory to patient    No notable events documented.

## 2024-03-18 NOTE — ANESTHESIA PRE PROCEDURE
Department of Anesthesiology  Preprocedure Note       Name:  Maryuri Calderón   Age:  88 y.o.  :  1935                                          MRN:  [id number]         Date:  3/18/2024      Surgeon: Surgeon(s):  Darlene Bender MD    Procedure: Procedure(s):  SIGMOIDOSCOPY DIAGNOSTIC FLEXIBLE    Medications prior to admission:   Prior to Admission medications    Medication Sig Start Date End Date Taking? Authorizing Provider   amLODIPine (NORVASC) 10 MG tablet Take 1 tablet by mouth daily   Yes Lita Rodriguez MD   NONFORMULARY Take 500 mg by mouth daily AZO D- Mannose   Yes Lita Rodriguez MD   cetirizine (ZYRTEC) 10 MG tablet Take 1 tablet by mouth nightly   Yes Lita Rodriguez MD   divalproex (DEPAKOTE) 125 MG DR tablet Take 1 tablet by mouth 3 times daily Depakote sprinkles   Yes Lita Rodriguez MD   diclofenac sodium (VOLTAREN) 1 % GEL Apply 2 g topically 2 times daily as needed for Pain To neck bid prn pain   Yes Lita Rodriguez MD   melatonin 3 MG TABS tablet Take 1 tablet by mouth nightly   Yes Lita Rodriguez MD   calcium carbonate (OYSTER SHELL CALCIUM 500 MG) 1250 (500 Ca) MG tablet Take 1 tablet by mouth 2 times daily   Yes Lita Rodriguez MD   sennosides-docusate sodium (SENOKOT-S) 8.6-50 MG tablet Take 2 tablets by mouth nightly   Yes Lita Rodriguez MD   acetaminophen (TYLENOL) 500 MG tablet Take 2 tablets by mouth in the morning and at bedtime Neck pain   Yes Lita Rordiguez MD   vitamin D (ERGOCALCIFEROL) 1.25 MG (32098 UT) CAPS capsule Take 1 capsule by mouth every 30 days On the  of the month   Yes Lita Rodriguez MD   polyethylene glycol (GLYCOLAX) 17 g packet Take 1 packet by mouth 2 times daily In 608 oz of fluid   Yes Lita Rodriguez MD   furosemide (LASIX) 20 MG tablet Take 1 tablet by mouth daily    Lita Rodriguez MD   potassium chloride (MICRO-K) 10 MEQ extended release capsule Take 1 capsule by mouth daily

## 2024-03-18 NOTE — ACP (ADVANCE CARE PLANNING)
Advance Care Planning   Healthcare Decision Maker:    Primary Decision Maker: Natalya Ford - Child - [phone]    Click here to complete Healthcare Decision Makers including selection of the Healthcare Decision Maker Relationship (ie \"Primary\").

## 2024-03-18 NOTE — CARE COORDINATION
Patient is a long term care resident at [facility]. Patient is able to return at discharge. CM following.       03/18/24 9174   Service Assessment   Patient Orientation Unable to Assess  (Patient unavailable)   Cognition Other (see comment)  (Patient unavailable)   History Provided By Child/Family   Primary Caregiver Other (Comment)  (LT facility staff)   Support Systems Children   Patient's Healthcare Decision Maker is: Legal Next of Kin   PCP Verified by CM Yes  ([facility])   Prior Functional Level Assistance with the following:;Bathing;Dressing;Toileting;Mobility   Current Functional Level Toileting;Dressing;Bathing;Mobility   Can patient return to prior living arrangement Yes   Ability to make needs known: Good   Family able to assist with home care needs: Yes   Would you like for me to discuss the discharge plan with any other family members/significant others, and if so, who? No   Financial Resources Medicare;Medicaid   Social/Functional History   Home Equipment Walker, rolling;Wheelchair-manual   Receives Help From Other (comment)  (LT facility staff)   Ambulation Assistance Needs assistance   Transfer Assistance Needs assistance   Active  No   Occupation Retired   Discharge Planning   Type of Residence Long-Term Care   Current Services Prior To Admission Skilled Nursing Facility   Patient expects to be discharged to: Long-term care   History of falls? 0

## 2024-03-18 NOTE — OP NOTE
Operative Report    Patient: Maryuri Calderón MRN: [id number]      YOB: 1935  Age: 88 y.o.  Sex: female            Indications:  Thickening on CT scan.    Preoperative Evaluation: The patient was evaluated prior to the procedure in the GI lab admission area, the patient ASA was recorded .  Consent was obtained from the patient with the risk of perforation bleeding and aspiration.    Anesthesia: TATIANA-per anesthesia    Complications: None; patient tolerated the procedure well.    EBL -insignificant    Procedure: The patient was sedated in the left lateral decubitus position.  Scope was advanced from the rectum to the cecum.  Per gastroenterology society guideline recommendations, right side of the colon was examined twice. Evaluation was performed to the cecum twice.  The scope was withdrawn to the rectum, retroflexed view was performed.  The rectal exam was normal.  Preparation was not done. Laveen score not applicable.    Findings:    Normal flexible sigmoidoscopy.    Postoperative Diagnosis:   Normal flex sig.     Recommendations:   GI will sign off at this time.   Resume regular diet.     Signed By:  Darlene Bender MD     March 18, 2024

## 2024-03-19 LAB
ALBUMIN SERPL-MCNC: 2.9 G/DL (ref 3.2–4.6)
ALBUMIN/GLOB SERPL: 0.7 (ref 0.4–1.6)
ALP SERPL-CCNC: 87 U/L (ref 50–136)
ALT SERPL-CCNC: 19 U/L (ref 12–65)
ANION GAP SERPL CALC-SCNC: 7 MMOL/L (ref 2–11)
AST SERPL-CCNC: 25 U/L (ref 15–37)
BASOPHILS # BLD: 0 K/UL (ref 0–0.2)
BASOPHILS NFR BLD: 1 % (ref 0–2)
BILIRUB SERPL-MCNC: 0.5 MG/DL (ref 0.2–1.1)
BUN SERPL-MCNC: 6 MG/DL (ref 8–23)
CALCIUM SERPL-MCNC: 8.8 MG/DL (ref 8.3–10.4)
CHLORIDE SERPL-SCNC: 104 MMOL/L (ref 103–113)
CO2 SERPL-SCNC: 29 MMOL/L (ref 21–32)
CREAT SERPL-MCNC: 0.6 MG/DL (ref 0.6–1)
DIFFERENTIAL METHOD BLD: ABNORMAL
EOSINOPHIL # BLD: 0.4 K/UL (ref 0–0.8)
EOSINOPHIL NFR BLD: 7 % (ref 0.5–7.8)
ERYTHROCYTE [DISTWIDTH] IN BLOOD BY AUTOMATED COUNT: 12.9 % (ref 11.9–14.6)
GLOBULIN SER CALC-MCNC: 3.9 G/DL (ref 2.8–4.5)
GLUCOSE BLD STRIP.AUTO-MCNC: 120 MG/DL (ref 65–100)
GLUCOSE BLD STRIP.AUTO-MCNC: 165 MG/DL (ref 65–100)
GLUCOSE BLD STRIP.AUTO-MCNC: 242 MG/DL (ref 65–100)
GLUCOSE BLD STRIP.AUTO-MCNC: 275 MG/DL (ref 65–100)
GLUCOSE SERPL-MCNC: 132 MG/DL (ref 65–100)
HCT VFR BLD AUTO: 33.1 % (ref 35.8–46.3)
HGB BLD-MCNC: 11.2 G/DL (ref 11.7–15.4)
IMM GRANULOCYTES # BLD AUTO: 0 K/UL (ref 0–0.5)
IMM GRANULOCYTES NFR BLD AUTO: 0 % (ref 0–5)
LYMPHOCYTES # BLD: 0.9 K/UL (ref 0.5–4.6)
LYMPHOCYTES NFR BLD: 17 % (ref 13–44)
MAGNESIUM SERPL-MCNC: 2.1 MG/DL (ref 1.8–2.4)
MCH RBC QN AUTO: 31.4 PG (ref 26.1–32.9)
MCHC RBC AUTO-ENTMCNC: 33.8 G/DL (ref 31.4–35)
MCV RBC AUTO: 92.7 FL (ref 82–102)
MM INDURATION, POC: 0 MM (ref 0–5)
MONOCYTES # BLD: 0.7 K/UL (ref 0.1–1.3)
MONOCYTES NFR BLD: 12 % (ref 4–12)
NEUTS SEG # BLD: 3.4 K/UL (ref 1.7–8.2)
NEUTS SEG NFR BLD: 63 % (ref 43–78)
NRBC # BLD: 0 K/UL (ref 0–0.2)
PLATELET # BLD AUTO: 231 K/UL (ref 150–450)
PMV BLD AUTO: 9.9 FL (ref 9.4–12.3)
POTASSIUM SERPL-SCNC: 2.7 MMOL/L (ref 3.5–5.1)
PPD, POC: NEGATIVE
PROT SERPL-MCNC: 6.8 G/DL (ref 6.3–8.2)
RBC # BLD AUTO: 3.57 M/UL (ref 4.05–5.2)
SERVICE CMNT-IMP: ABNORMAL
SODIUM SERPL-SCNC: 140 MMOL/L (ref 136–146)
WBC # BLD AUTO: 5.4 K/UL (ref 4.3–11.1)

## 2024-03-19 PROCEDURE — 6370000000 HC RX 637 (ALT 250 FOR IP): Performed by: INTERNAL MEDICINE

## 2024-03-19 PROCEDURE — 83735 ASSAY OF MAGNESIUM: CPT

## 2024-03-19 PROCEDURE — 82962 GLUCOSE BLOOD TEST: CPT

## 2024-03-19 PROCEDURE — 97530 THERAPEUTIC ACTIVITIES: CPT

## 2024-03-19 PROCEDURE — 92526 ORAL FUNCTION THERAPY: CPT

## 2024-03-19 PROCEDURE — 6360000002 HC RX W HCPCS: Performed by: STUDENT IN AN ORGANIZED HEALTH CARE EDUCATION/TRAINING PROGRAM

## 2024-03-19 PROCEDURE — 2580000003 HC RX 258: Performed by: STUDENT IN AN ORGANIZED HEALTH CARE EDUCATION/TRAINING PROGRAM

## 2024-03-19 PROCEDURE — 6370000000 HC RX 637 (ALT 250 FOR IP): Performed by: FAMILY MEDICINE

## 2024-03-19 PROCEDURE — 6360000002 HC RX W HCPCS: Performed by: FAMILY MEDICINE

## 2024-03-19 PROCEDURE — 6370000000 HC RX 637 (ALT 250 FOR IP): Performed by: STUDENT IN AN ORGANIZED HEALTH CARE EDUCATION/TRAINING PROGRAM

## 2024-03-19 PROCEDURE — 1100000000 HC RM PRIVATE

## 2024-03-19 PROCEDURE — 80053 COMPREHEN METABOLIC PANEL: CPT

## 2024-03-19 PROCEDURE — 36415 COLL VENOUS BLD VENIPUNCTURE: CPT

## 2024-03-19 PROCEDURE — 97162 PT EVAL MOD COMPLEX 30 MIN: CPT

## 2024-03-19 PROCEDURE — 2580000003 HC RX 258: Performed by: ANESTHESIOLOGY

## 2024-03-19 PROCEDURE — 85025 COMPLETE CBC W/AUTO DIFF WBC: CPT

## 2024-03-19 PROCEDURE — 97535 SELF CARE MNGMENT TRAINING: CPT

## 2024-03-19 PROCEDURE — 97166 OT EVAL MOD COMPLEX 45 MIN: CPT

## 2024-03-19 PROCEDURE — 2580000003 HC RX 258: Performed by: FAMILY MEDICINE

## 2024-03-19 RX ORDER — INSULIN GLARGINE 100 [IU]/ML
5 INJECTION, SOLUTION SUBCUTANEOUS NIGHTLY
Status: DISCONTINUED | OUTPATIENT
Start: 2024-03-19 | End: 2024-03-20 | Stop reason: HOSPADM

## 2024-03-19 RX ADMIN — DIVALPROEX SODIUM 125 MG: 125 TABLET, DELAYED RELEASE ORAL at 22:55

## 2024-03-19 RX ADMIN — POTASSIUM CHLORIDE 10 MEQ: 7.46 INJECTION, SOLUTION INTRAVENOUS at 13:43

## 2024-03-19 RX ADMIN — METRONIDAZOLE 500 MG: 500 INJECTION, SOLUTION INTRAVENOUS at 12:46

## 2024-03-19 RX ADMIN — METRONIDAZOLE 500 MG: 500 INJECTION, SOLUTION INTRAVENOUS at 04:01

## 2024-03-19 RX ADMIN — MEMANTINE 10 MG: 5 TABLET ORAL at 09:55

## 2024-03-19 RX ADMIN — Medication 3 MG: at 22:55

## 2024-03-19 RX ADMIN — DIVALPROEX SODIUM 125 MG: 125 TABLET, DELAYED RELEASE ORAL at 09:52

## 2024-03-19 RX ADMIN — ASPIRIN 325 MG: 325 TABLET, COATED ORAL at 10:15

## 2024-03-19 RX ADMIN — ACETAMINOPHEN 650 MG: 325 TABLET ORAL at 10:08

## 2024-03-19 RX ADMIN — VITAMIN D, TAB 1000IU (100/BT) 2000 UNITS: 25 TAB at 09:57

## 2024-03-19 RX ADMIN — POTASSIUM CHLORIDE 10 MEQ: 7.46 INJECTION, SOLUTION INTRAVENOUS at 17:48

## 2024-03-19 RX ADMIN — METRONIDAZOLE 500 MG: 500 INJECTION, SOLUTION INTRAVENOUS at 19:34

## 2024-03-19 RX ADMIN — CIPROFLOXACIN 400 MG: 400 INJECTION, SOLUTION INTRAVENOUS at 10:12

## 2024-03-19 RX ADMIN — CEFTRIAXONE 1000 MG: 1 INJECTION, POWDER, FOR SOLUTION INTRAMUSCULAR; INTRAVENOUS at 10:09

## 2024-03-19 RX ADMIN — MEMANTINE 10 MG: 5 TABLET ORAL at 22:55

## 2024-03-19 RX ADMIN — POTASSIUM CHLORIDE 10 MEQ: 7.46 INJECTION, SOLUTION INTRAVENOUS at 10:24

## 2024-03-19 RX ADMIN — INSULIN LISPRO 2 UNITS: 100 INJECTION, SOLUTION INTRAVENOUS; SUBCUTANEOUS at 10:07

## 2024-03-19 RX ADMIN — DOCUSATE SODIUM 50 MG AND SENNOSIDES 8.6 MG 2 TABLET: 8.6; 5 TABLET, FILM COATED ORAL at 22:56

## 2024-03-19 RX ADMIN — FUROSEMIDE 20 MG: 20 TABLET ORAL at 09:52

## 2024-03-19 RX ADMIN — SODIUM CHLORIDE, PRESERVATIVE FREE 10 ML: 5 INJECTION INTRAVENOUS at 09:56

## 2024-03-19 RX ADMIN — POTASSIUM CHLORIDE 10 MEQ: 7.46 INJECTION, SOLUTION INTRAVENOUS at 08:02

## 2024-03-19 RX ADMIN — DIVALPROEX SODIUM 125 MG: 125 TABLET, DELAYED RELEASE ORAL at 13:33

## 2024-03-19 RX ADMIN — POTASSIUM CHLORIDE 10 MEQ: 750 TABLET, EXTENDED RELEASE ORAL at 09:56

## 2024-03-19 RX ADMIN — SODIUM CHLORIDE, POTASSIUM CHLORIDE, SODIUM LACTATE AND CALCIUM CHLORIDE: 600; 310; 30; 20 INJECTION, SOLUTION INTRAVENOUS at 11:08

## 2024-03-19 RX ADMIN — INSULIN GLARGINE 5 UNITS: 100 INJECTION, SOLUTION SUBCUTANEOUS at 22:55

## 2024-03-19 RX ADMIN — INSULIN LISPRO 1 UNITS: 100 INJECTION, SOLUTION INTRAVENOUS; SUBCUTANEOUS at 12:44

## 2024-03-19 ASSESSMENT — PAIN DESCRIPTION - ORIENTATION: ORIENTATION: RIGHT

## 2024-03-19 ASSESSMENT — PAIN SCALES - GENERAL
PAINLEVEL_OUTOF10: 10
PAINLEVEL_OUTOF10: 0

## 2024-03-19 ASSESSMENT — PAIN DESCRIPTION - LOCATION: LOCATION: ABDOMEN

## 2024-03-20 VITALS
OXYGEN SATURATION: 100 % | TEMPERATURE: 98.1 F | DIASTOLIC BLOOD PRESSURE: 64 MMHG | HEIGHT: 64 IN | HEART RATE: 71 BPM | BODY MASS INDEX: 25.66 KG/M2 | WEIGHT: 150.3 LBS | RESPIRATION RATE: 18 BRPM | SYSTOLIC BLOOD PRESSURE: 149 MMHG

## 2024-03-20 LAB
ALBUMIN SERPL-MCNC: 2.8 G/DL (ref 3.2–4.6)
ALBUMIN/GLOB SERPL: 0.8 (ref 0.4–1.6)
ALP SERPL-CCNC: 83 U/L (ref 50–136)
ALT SERPL-CCNC: 27 U/L (ref 12–65)
ANION GAP SERPL CALC-SCNC: 5 MMOL/L (ref 2–11)
AST SERPL-CCNC: 47 U/L (ref 15–37)
BASOPHILS # BLD: 0 K/UL (ref 0–0.2)
BASOPHILS NFR BLD: 0 % (ref 0–2)
BILIRUB SERPL-MCNC: 0.4 MG/DL (ref 0.2–1.1)
BUN SERPL-MCNC: 7 MG/DL (ref 8–23)
CALCIUM SERPL-MCNC: 8.9 MG/DL (ref 8.3–10.4)
CHLORIDE SERPL-SCNC: 108 MMOL/L (ref 103–113)
CO2 SERPL-SCNC: 29 MMOL/L (ref 21–32)
CREAT SERPL-MCNC: 0.6 MG/DL (ref 0.6–1)
DIFFERENTIAL METHOD BLD: ABNORMAL
EOSINOPHIL # BLD: 0.3 K/UL (ref 0–0.8)
EOSINOPHIL NFR BLD: 6 % (ref 0.5–7.8)
ERYTHROCYTE [DISTWIDTH] IN BLOOD BY AUTOMATED COUNT: 13.3 % (ref 11.9–14.6)
GLOBULIN SER CALC-MCNC: 3.6 G/DL (ref 2.8–4.5)
GLUCOSE BLD STRIP.AUTO-MCNC: 133 MG/DL (ref 65–100)
GLUCOSE BLD STRIP.AUTO-MCNC: 136 MG/DL (ref 65–100)
GLUCOSE SERPL-MCNC: 134 MG/DL (ref 65–100)
HCT VFR BLD AUTO: 32.1 % (ref 35.8–46.3)
HGB BLD-MCNC: 10.5 G/DL (ref 11.7–15.4)
IMM GRANULOCYTES # BLD AUTO: 0 K/UL (ref 0–0.5)
IMM GRANULOCYTES NFR BLD AUTO: 0 % (ref 0–5)
LYMPHOCYTES # BLD: 1.1 K/UL (ref 0.5–4.6)
LYMPHOCYTES NFR BLD: 20 % (ref 13–44)
MAGNESIUM SERPL-MCNC: 1.8 MG/DL (ref 1.8–2.4)
MCH RBC QN AUTO: 30.7 PG (ref 26.1–32.9)
MCHC RBC AUTO-ENTMCNC: 32.7 G/DL (ref 31.4–35)
MCV RBC AUTO: 93.9 FL (ref 82–102)
MM INDURATION, POC: 0 MM (ref 0–5)
MONOCYTES # BLD: 0.6 K/UL (ref 0.1–1.3)
MONOCYTES NFR BLD: 11 % (ref 4–12)
NEUTS SEG # BLD: 3.5 K/UL (ref 1.7–8.2)
NEUTS SEG NFR BLD: 63 % (ref 43–78)
NRBC # BLD: 0 K/UL (ref 0–0.2)
PLATELET # BLD AUTO: 234 K/UL (ref 150–450)
PMV BLD AUTO: 10 FL (ref 9.4–12.3)
POTASSIUM SERPL-SCNC: 3.1 MMOL/L (ref 3.5–5.1)
POTASSIUM SERPL-SCNC: 3.7 MMOL/L (ref 3.5–5.1)
PPD, POC: NEGATIVE
PROT SERPL-MCNC: 6.4 G/DL (ref 6.3–8.2)
RBC # BLD AUTO: 3.42 M/UL (ref 4.05–5.2)
SARS-COV-2 RDRP RESP QL NAA+PROBE: NOT DETECTED
SERVICE CMNT-IMP: ABNORMAL
SERVICE CMNT-IMP: ABNORMAL
SODIUM SERPL-SCNC: 142 MMOL/L (ref 136–146)
SOURCE: NORMAL
WBC # BLD AUTO: 5.6 K/UL (ref 4.3–11.1)

## 2024-03-20 PROCEDURE — 6370000000 HC RX 637 (ALT 250 FOR IP): Performed by: INTERNAL MEDICINE

## 2024-03-20 PROCEDURE — 85025 COMPLETE CBC W/AUTO DIFF WBC: CPT

## 2024-03-20 PROCEDURE — 80053 COMPREHEN METABOLIC PANEL: CPT

## 2024-03-20 PROCEDURE — 84132 ASSAY OF SERUM POTASSIUM: CPT

## 2024-03-20 PROCEDURE — 82962 GLUCOSE BLOOD TEST: CPT

## 2024-03-20 PROCEDURE — 87635 SARS-COV-2 COVID-19 AMP PRB: CPT

## 2024-03-20 PROCEDURE — 6360000002 HC RX W HCPCS: Performed by: FAMILY MEDICINE

## 2024-03-20 PROCEDURE — 36415 COLL VENOUS BLD VENIPUNCTURE: CPT

## 2024-03-20 PROCEDURE — 6360000002 HC RX W HCPCS: Performed by: STUDENT IN AN ORGANIZED HEALTH CARE EDUCATION/TRAINING PROGRAM

## 2024-03-20 PROCEDURE — 2580000003 HC RX 258: Performed by: STUDENT IN AN ORGANIZED HEALTH CARE EDUCATION/TRAINING PROGRAM

## 2024-03-20 PROCEDURE — 83735 ASSAY OF MAGNESIUM: CPT

## 2024-03-20 PROCEDURE — 6370000000 HC RX 637 (ALT 250 FOR IP): Performed by: FAMILY MEDICINE

## 2024-03-20 PROCEDURE — 2580000003 HC RX 258: Performed by: FAMILY MEDICINE

## 2024-03-20 RX ORDER — POLYETHYLENE GLYCOL 3350 17 G/17G
17 POWDER, FOR SOLUTION ORAL DAILY PRN
Qty: 30 PACKET | Refills: 0 | Status: SHIPPED | OUTPATIENT
Start: 2024-03-20 | End: 2024-04-19

## 2024-03-20 RX ORDER — METRONIDAZOLE 500 MG/1
500 TABLET ORAL 3 TIMES DAILY
Qty: 9 TABLET | Refills: 0 | Status: SHIPPED | OUTPATIENT
Start: 2024-03-20 | End: 2024-03-23

## 2024-03-20 RX ORDER — POTASSIUM CHLORIDE 7.45 MG/ML
10 INJECTION INTRAVENOUS ONCE
Status: DISCONTINUED | OUTPATIENT
Start: 2024-03-20 | End: 2024-03-20 | Stop reason: HOSPADM

## 2024-03-20 RX ORDER — POTASSIUM CHLORIDE 20 MEQ/1
40 TABLET, EXTENDED RELEASE ORAL ONCE
Status: DISCONTINUED | OUTPATIENT
Start: 2024-03-20 | End: 2024-03-20 | Stop reason: HOSPADM

## 2024-03-20 RX ORDER — POTASSIUM CHLORIDE 20 MEQ/1
40 TABLET, EXTENDED RELEASE ORAL 2 TIMES DAILY
Status: DISCONTINUED | OUTPATIENT
Start: 2024-03-20 | End: 2024-03-20 | Stop reason: HOSPADM

## 2024-03-20 RX ORDER — METRONIDAZOLE 500 MG/100ML
500 INJECTION, SOLUTION INTRAVENOUS EVERY 8 HOURS
Status: DISCONTINUED | OUTPATIENT
Start: 2024-03-20 | End: 2024-03-20 | Stop reason: HOSPADM

## 2024-03-20 RX ORDER — CEFPODOXIME PROXETIL 200 MG/1
200 TABLET, FILM COATED ORAL 2 TIMES DAILY
Qty: 6 TABLET | Refills: 0 | Status: SHIPPED | OUTPATIENT
Start: 2024-03-20 | End: 2024-03-23

## 2024-03-20 RX ORDER — POTASSIUM CHLORIDE 750 MG/1
20 CAPSULE, EXTENDED RELEASE ORAL DAILY
Qty: 60 CAPSULE | Refills: 3 | Status: SHIPPED | OUTPATIENT
Start: 2024-03-20

## 2024-03-20 RX ADMIN — MEMANTINE 10 MG: 5 TABLET ORAL at 09:13

## 2024-03-20 RX ADMIN — DIVALPROEX SODIUM 125 MG: 125 TABLET, DELAYED RELEASE ORAL at 09:12

## 2024-03-20 RX ADMIN — FUROSEMIDE 20 MG: 20 TABLET ORAL at 09:12

## 2024-03-20 RX ADMIN — VITAMIN D, TAB 1000IU (100/BT) 2000 UNITS: 25 TAB at 09:17

## 2024-03-20 RX ADMIN — POTASSIUM CHLORIDE 10 MEQ: 7.46 INJECTION, SOLUTION INTRAVENOUS at 06:29

## 2024-03-20 RX ADMIN — SODIUM CHLORIDE, PRESERVATIVE FREE 10 ML: 5 INJECTION INTRAVENOUS at 09:18

## 2024-03-20 RX ADMIN — POTASSIUM CHLORIDE 40 MEQ: 1500 TABLET, EXTENDED RELEASE ORAL at 09:19

## 2024-03-20 RX ADMIN — METRONIDAZOLE 500 MG: 500 INJECTION, SOLUTION INTRAVENOUS at 12:00

## 2024-03-20 RX ADMIN — POTASSIUM CHLORIDE 10 MEQ: 7.46 INJECTION, SOLUTION INTRAVENOUS at 07:39

## 2024-03-20 RX ADMIN — AMLODIPINE BESYLATE 10 MG: 10 TABLET ORAL at 09:11

## 2024-03-20 RX ADMIN — CEFTRIAXONE 1000 MG: 1 INJECTION, POWDER, FOR SOLUTION INTRAMUSCULAR; INTRAVENOUS at 10:23

## 2024-03-20 NOTE — CARE COORDINATION
Patient with discharge orders for today. Patient discharging to  [facility] [place]. [facility] to provide transport. Pick-up time: 1400. Report line information given to primary RN. Patient has met all treatment goals and milestones for discharge. Patient/family in agreement with discharge plan. CM following until patient is discharged.

## 2024-03-20 NOTE — DISCHARGE SUMMARY
Hospitalist Discharge Summary   Admit Date:  3/16/2024  3:08 PM   DC Note date: 3/20/2024  Name:  Maryuri Calderón   Age:  88 y.o.  Sex:  female  :  1935   MRN:  [id number]   Room:  [facility]  PCP:  No primary care provider on file.    Presenting Complaint: Abnormal Lab     Initial Admission Diagnosis: Lactic acidosis [E87.20]  Colitis [K52.9]  Colon distention [K63.89]  Urinary tract infection without hematuria, site unspecified [N39.0]     Problem List for this Hospitalization (present on admission):    Principal Problem:    Colitis  Active Problems:    Essential hypertension    Type 2 diabetes mellitus without complication, without long-term current use of insulin (HCC)    Dementia ([facility])    E-coli UTI  Resolved Problems:    * No resolved hospital problems. *      Hospital Course:  Maryuri Calderón is a 88 y.o. female with medical history of diabetes type 2, hypertension, dementia who presented from [facility] secondary to abnormal labs and concern for ileus on x-ray. CT abdomen pelvis with moderately distended colon containing fluid as well as distended rectum as well as regions of the sigmoid colonic wall thickening which could be secondary to colitis.      Patient admitted with acute colitis and UTI.  Started on empiric antibiotics and supportive care with gradual improvement in symptoms.  Urine culture grew E. coli, pansensitive.  Electrolytes repleted as needed.  Potassium on the day of discharge 3.1, repleted with p.o. and IV supplementation and started on p.o. potassium on discharge.  Recommend to repeat BMP in 2 to 3 days.    All other chronic conditions stable and we continued essential home meds.  No new complaint on the day of discharge.  Patient is a long-term care resident at [facility] and stable to discharge back to facility today.    Disposition: SNF Rehab  Diet: ADULT DIET; Dysphagia - Minced and Moist  Code Status: Full Code    Follow Ups:  Follow-up Information       Follow up With Specialties

## 2024-03-20 NOTE — PROGRESS NOTES
Hospitalist Progress Note   Admit Date:  3/16/2024  3:08 PM   Name:  Maryuri Calderón   Age:  88 y.o.  Sex:  female  :  1935   MRN:  [id number]   Room:      Presenting/Chief Complaint: Abnormal Lab     Reason(s) for Admission: Lactic acidosis [E87.20]  Colitis [K52.9]  Colon distention [K63.89]  Urinary tract infection without hematuria, site unspecified [N39.0]     Hospital Course:     Copied from prior providers history and physical/HPI:  Maryuri Calderón is a 88 y.o. female with medical history of diabetes type 2, hypertension, dementia who presented from [facility] secondary to abnormal labs and concern for ileus on x-ray.  Patient poor historian secondary to underlying dementia.  Daughter at bedside.  Daughter stated she had been complaining of some abdominal discomfort over the past few days.  Patient presented with elevated lactic acid of 2.5.  CT abdomen pelvis with moderately distended colon containing fluid as well as distended rectum as well as regions of the sigmoid colonic wall thickening which could be secondary to colitis.  Patient was started on IV antibiotics in the emergency department.  Glucose 217, WBC of 5.5, potassium 3.4.  Chest x-ray without acute abnormality.    Subjective & 24hr Events:   Patient with advanced dementia-no obvious distress-underwent sigmoidoscopy without any significant abnormalities-receiving antibiotics for colitis.  Wall thickening reported on imaging study.  Receiving sliding scale insulin coverage for sugar control hypertension and anxiety treated with home meds, as needed clonidine and Atarax for anxiety.  Unable to provide any significant history but does not appear to be in any distress at time of exam          Assessment & Plan:   Colitis  3/18--continue current antibiotics.  Sigmoidoscopy without significant findings per report.     Type 2 diabetes  3/18----continue sliding scale Humalog coverage.     Anxiety  - Continue home Atarax    Hypertension 
  SPEECH LANGUAGE PATHOLOGY: ATTEMPT     NAME: Maryuri Calderón  : 1935  MRN: [id number]    ADMISSION DATE: 3/16/2024  PRIMARY DIAGNOSIS: Colitis    Speech Therapy attempted. Per RN patient is NPO for procedure around noon today, will follow up as schedule permits/patient is available.     YO BERMEO SLP  3/18/2024 9:50 AM    
Attempted to see patient for occupational therapy evaluation session. Patient off floor. Will follow and re-attempt as schedule permits/patient available. Thank you,    Jessi Madison, OT    Rehab Caseload Tracker       
D/C from unit with belongings. Accompanied by EMS personnel.  No distress at time of D/C.  Transported via stretcher    
END OF SHIFT NOTE:    INTAKE/OUTPUT  03/16 0701 - 03/17 0700  In: 1000   Out: 550 [Urine:550]  Voiding: yes  Catheter: no  Drain:   External Urinary Catheter (Active)   Urine Color Yellow 03/17/24 0312   Urine Appearance Cloudy 03/17/24 0312   Output (mL) 300 mL 03/17/24 0312               Flatus: Patient does have flatus present.    Stool:  1 occurrences.  Loose green stool x 1 small amount  Characteristics:       Emesis: 0 occurrences.    Characteristics:        VITAL SIGNS  Patient Vitals for the past 12 hrs:   Temp Pulse Resp BP SpO2   03/17/24 0312 97.5 °F (36.4 °C) 81 17 (!) 153/85 94 %   03/16/24 2313 97.9 °F (36.6 °C) 88 17 (!) 161/67 97 %   03/16/24 1959 98.4 °F (36.9 °C) 92 16 124/79 100 %   03/16/24 1901 -- 84 20 (!) 147/60 100 %   03/16/24 1846 -- 81 19 121/66 93 %   03/16/24 1815 -- -- -- -- 100 %   03/16/24 1800 -- 89 16 94/75 --   03/16/24 1745 -- 91 18 (!) 140/67 --   03/16/24 1730 -- 88 19 (!) 143/63 --   03/16/24 1715 -- 90 15 (!) 144/73 --       Pain Assessment                Ambulating  no    Shift report given to oncoming nurse at the bedside.    Kely Montana, RN     
END OF SHIFT NOTE:    INTAKE/OUTPUT  03/16 0701 - 03/17 0700  In: 1984 [I.V.:599.8]  Out: 750 [Urine:750]  Voiding: Yes  Catheter: No  Drain:   External Urinary Catheter (Active)   Site Assessment Clean,dry & intact 03/17/24 1420   Placement Replaced 03/17/24 1420   Securement Method Securing device (Describe) 03/17/24 1420   Catheter Care Catheter/Wick replaced;Suction Canister/Tubing changed 03/17/24 1420   Perineal Care Yes 03/17/24 1420   Urine Color Meseret 03/17/24 1420   Urine Appearance Clear 03/17/24 1420   Output (mL) 300 mL 03/17/24 1420               Flatus: Patient does have flatus present.    Stool:  occurrences.    Characteristics:  Stool Appearance: Watery  Stool Color: Brown  Stool Amount: Medium  Stool Assessment  Incontinence: Yes  Stool Appearance: Watery  Stool Color: Brown  Stool Amount: Medium  Stool Source: Rectum  Last BM (including prior to admit): 03/16/24    Emesis:  occurrences.    Characteristics:        VITAL SIGNS  Patient Vitals for the past 12 hrs:   Temp Pulse Resp BP SpO2   03/17/24 1631 -- -- -- (!) 151/59 --   03/17/24 1519 98.6 °F (37 °C) 91 18 (!) 151/59 100 %   03/17/24 1115 98.4 °F (36.9 °C) (!) 103 19 (!) 143/59 98 %   03/17/24 0720 98.1 °F (36.7 °C) 91 18 (!) 145/83 95 %       Pain Assessment  Pain Level: 0 (03/17/24 0815)  Pain Location: Abdomen       Ambulating  No    Shift report given to oncoming nurse at the bedside.    Vivian Rivas, RN    
END OF SHIFT NOTE:    INTAKE/OUTPUT  03/17 0701 - 03/18 0700  In: 0   Out: 1300 [Urine:1300]  Voiding: yes  Catheter: no  Drain:   External Urinary Catheter (Active)   Site Assessment Clean,dry & intact 03/17/24 1420   Placement Replaced 03/17/24 2034   Securement Method Securing device (Describe) 03/17/24 1420   Catheter Care Catheter/Wick replaced 03/17/24 2034   Perineal Care Yes 03/17/24 1420   Urine Color Meseret 03/17/24 1420   Urine Appearance Clear 03/17/24 1420   Output (mL) 500 mL 03/17/24 2034               Flatus: Patient does have flatus present.    Stool:  2 occurrences.  Samll amounts watery brown stool x 2 this shfit.  Characteristics:       Emesis: 0 occurrences.    Characteristics:        VITAL SIGNS  Patient Vitals for the past 12 hrs:   Temp Pulse Resp BP SpO2   03/18/24 0400 97.9 °F (36.6 °C) 80 20 (!) 149/69 97 %   03/17/24 1911 98.4 °F (36.9 °C) 97 17 (!) 149/78 --       Pain Assessment                Ambulating  No  Calmer/slept after geodon. Bed alarm on.   0715-1000cc tap water enema given at this time with the assistance of Candy on day shift. She will given 2nd enema. Pt remains confused but drowsy at this time. Pt was able to hold the 1000cc thus far.     Shift report given to oncoming nurse at the bedside.    Kely Montana RN     
END OF SHIFT NOTE:    INTAKE/OUTPUT  03/19 0701 - 03/20 0700  In: 240 [P.O.:240]  Out: 1950 [Urine:1950]  Voiding: Yes  Catheter: Yes (purwick)  Drain:   External Urinary Catheter (Active)   Site Assessment Clean,dry & intact 03/19/24 1645   Placement Replaced 03/19/24 1645   Securement Method Securing device (Describe) 03/19/24 1645   Catheter Care Catheter/Wick replaced 03/19/24 1645   Perineal Care Yes 03/19/24 1645   Suction 40 mmgHg continuous 03/19/24 1645   Urine Color Meseret 03/19/24 1645   Urine Appearance Clear 03/19/24 1645   Output (mL) 600 mL 03/20/24 0352       External Urinary Catheter (Active)               Flatus: Patient does have flatus present.    Stool:  occurrences.    Characteristics:  Stool Appearance:  (No stool to assess at this time)  Stool Color: Brown  Stool Amount: Medium  Stool Assessment  Incontinence: Yes  Stool Appearance:  (No stool to assess at this time)  Stool Color: Brown  Stool Amount: Medium  Stool Source: Rectum  Last BM (including prior to admit): 03/18/24    Emesis:  occurrences.    Characteristics:        VITAL SIGNS  Patient Vitals for the past 12 hrs:   Temp Pulse Resp BP SpO2   03/20/24 0352 98.4 °F (36.9 °C) 88 18 (!) 152/75 91 %   03/19/24 2303 97.7 °F (36.5 °C) 99 18 (!) 150/87 96 %   03/19/24 1941 98.2 °F (36.8 °C) 93 18 (!) 164/71 98 %       Pain Assessment  Pain Level: 0 (03/19/24 2140)  Pain Location: Abdomen  Patient's Stated Pain Goal: 0 - No pain    Ambulating  No    Shift report given to oncoming nurse at the bedside.    Elyse Gutierrez RN     
END OF SHIFT NOTE:    INTAKE/OUTPUT  No intake/output data recorded.  Voiding: Yes  Catheter: Yes(purwick)  Drain:   External Urinary Catheter (Active)               Flatus: Patient does have flatus present.    Stool:  occurrences.    Characteristics:           Stool Assessment  Last BM (including prior to admit): 03/15/24 (per pt daughter)    Emesis:  occurrences.    Characteristics:        VITAL SIGNS  Patient Vitals for the past 12 hrs:   Temp Pulse Resp BP SpO2   03/16/24 2313 97.9 °F (36.6 °C) 88 17 (!) 161/67 97 %   03/16/24 1959 98.4 °F (36.9 °C) 92 16 124/79 100 %   03/16/24 1901 -- 84 20 (!) 147/60 100 %   03/16/24 1846 -- 81 19 121/66 93 %   03/16/24 1815 -- -- -- -- 100 %   03/16/24 1800 -- 89 16 94/75 --   03/16/24 1745 -- 91 18 (!) 140/67 --   03/16/24 1730 -- 88 19 (!) 143/63 --   03/16/24 1715 -- 90 15 (!) 144/73 --   03/16/24 1700 -- 88 19 (!) 142/58 --   03/16/24 1645 -- 95 17 (!) 138/101 --   03/16/24 1513 98.3 °F (36.8 °C) 100 20 131/63 94 %       Pain Assessment  Pain Level: 0 (03/16/24 2035)  Pain Location: Abdomen       Ambulating  No    Shift report given to oncoming nurse at the bedside.    Elyse Gutierrez RN      
First of 6 K+ 10 started per order  
Has been very confused/hyperverbal, trying to get oob, stating that her belly hurt. Is now NPO because of choking episodes earlier in day. Hospitalists messaged and orders for IV morphine/ IM geodon were obtained and given per MAR. At 10 pm she has started to calm down. Daughter called earlier and was updated and given time for procedure tomorrow (around 12noon) . Daughter stated she will be here in am.   
PPD read and documented  
Physical Therapy Note:    Attempted to see patient this AM for physical therapy evaluation session. Patient off the floor. Will follow and re-attempt as schedule permits/patient available. Thank you,    A Maria D Garcia, PT     Rehab Caseload Tracker    
Plan for flex sig today.   
Spoke with MD.  Pt without IV last night related to difficulty in accessing a vein and her prn K+ started yesterday is still infusing.  .  Instructed to hold the one time K+ IV potassium and give the 40K PO.    
TRANSFER - IN REPORT:    Verbal report received from Evie STYLES on Maryuri SAM Brace  being received from er for routine progression of patient care      Report consisted of patient's Situation, Background, Assessment and   Recommendations(SBAR).     Information from the following report(s) ED SBAR was reviewed with the receiving nurse.    Opportunity for questions and clarification was provided.      Assessment completed upon patient's arrival to unit and care assumed.    
TRANSFER - IN REPORT:    Verbal report received from JENSEN Gupta on Maryuri SAM Brace  being received from  for ordered procedure      Report consisted of patient's Situation, Background, Assessment and   Recommendations(SBAR).     Information from the following report(s) Pre Procedure Checklist and Procedure Verification was reviewed with the receiving nurse.    Opportunity for questions and clarification was provided.      Asked JENSEN Gupta to hold morning Aspirin and to call GI floor [phone] after tap water enemas were given.   
TRANSFER - OUT REPORT:    Verbal report given to Candy RN on Maryuri Calderón  being transferred to 2nd floor for routine progression of patient care       Report consisted of patient's Situation, Background, Assessment and   Recommendations(SBAR).     Information from the following report(s) Nurse Handoff Report was reviewed with the receiving nurse.           Lines:   Peripheral IV 03/17/24 Distal;Posterior;Right Forearm (Active)   Site Assessment Clean, dry & intact 03/18/24 1057   Line Status Infusing 03/18/24 0113   Line Care Connections checked and tightened 03/18/24 0113   Phlebitis Assessment No symptoms 03/18/24 1057   Infiltration Assessment 0 03/18/24 1057   Alcohol Cap Used Yes 03/18/24 0113   Dressing Status Clean, dry & intact 03/18/24 0113   Dressing Type Transparent 03/18/24 0113   Dressing Intervention New 03/18/24 0113        Opportunity for questions and clarification was provided.             
TRANSFER - OUT REPORT:    Verbal report given to Nataly Calderón  being transferred to [facility] for routine progression of patient care       Report consisted of patient's Situation, Background, Assessment and   Recommendations(SBAR).     Information from the following report(s) Nurse Handoff Report, Adult Overview, and Intake/Output was reviewed with the receiving nurse.           Lines:   Peripheral IV 03/20/24 Left;Posterior Hand (Active)   Site Assessment Clean, dry & intact 03/20/24 0842   Line Status Infusing 03/20/24 0842   Line Care Connections checked and tightened 03/20/24 0842   Phlebitis Assessment No symptoms 03/20/24 0842   Infiltration Assessment 0 03/20/24 0842   Alcohol Cap Used No 03/20/24 0842   Dressing Status Clean, dry & intact 03/20/24 0842   Dressing Type Transparent 03/20/24 0842        Opportunity for questions and clarification was provided.      Patient transported by EMS        
  Oropharyngeal Assessment: Patient agreeable to PO intake. Trials of thin liquid unremarkable, no indications of airway compromise at bedside. On soft and regular solids patient with prolonged mastication and mild-moderate oral residue post swallow, patient able to clear bolus independently. Daughter at bedside reporting patient consumes mechanical soft diet at SNF due to missing dentition, agreeable to patient being on minced moist diet at this time.     Dysphagia Outcome and Severity Scale (VERONICA)  Score: 4 Description   [] 7 Normal in all situations   [] 6 Within Functional Limits/ Modified independent   [] 5 Mild Dysphagia: Distant Supervision. May need one diet consistency      restricted.   [x] 4 Mild-Moderate Dysphagia: Intermittent supervision/cuing. One-two diet    consistencies restricted.   [] 3 Moderate Dysphagia: Total assistance, supervision, or strategies.       Two or more diet consistencies restricted.   [] 2 Moderate-Severe Dysphagia: Maximum assistance or maximum use     of strategies with partial po intake   [] 1 Severe dysphagia- NPO. Unable to tolerate any po safely     PLAN    Duration/Frequency: Continue to follow patient 2x/week for duration of hospitalization and/or until goals met    Rehabilitation Potential For Stated Goals: Excellent    Interdisciplinary Collaboration: RN/ PCT    Medical Necessity    Patient is expected to demonstrate progress in swallow function to improve swallow safety and work toward diet advancement.    Education:   Patient educated on Speech therapy recommendations, Role of speech therapy, SLP plan, and Diet recommendations  Education provided to Patient, Family/Caregiver, and RN  Education response: No evidence of learning (patient), RN/family verbalized understanding    Safety:   In chair  RN notified   Family/visitors at bedside    Therapy Time:  Time In: 1316  Time Out: 1331  Minutes: 15    MINESH CARR  3/19/2024 1:35 PM    
IV    RESTRICTIONS/PRECAUTIONS:  Restrictions/Precautions: Fall Risk                 GROSS EVALUATION: LE Intact Impaired (Comments): based on mobility, unable to formally test due to Council   AROM [x]     PROM []    Strength [x]     Balance [] Sitting - Static: Fair, +  Standing - Static: Poor  Standing - Dynamic: Poor   Posture [] Forward Head  Rounded Shoulders   Sensation []     Coordination []      Tone [x]     Edema []    Activity Tolerance [] Patient limited by pain    []      COGNITION/  PERCEPTION: Intact Impaired (Comments):   Orientation []  Unable to assess   Vision []     Hearing []  Extremely Council has cochlear implants   Cognition  []  History of dementia     MOBILITY: I Mod I S SBA CGA Min Mod Max Total  NT x2 Comments:   Bed Mobility    Rolling [] [] [] [] [] [] [] [] [] [] []    Supine to Sit [] [] [] [] [] [x] [] [] [] [] [x]    Scooting [] [] [] [] [] [] [] [x] [] [] [x]    Sit to Supine [] [] [] [] [] [] [] [] [] [] []    Transfers    Sit to Stand [] [] [] [] [] [] [] [x] [] [] [x]    Bed to Chair [] [] [] [] [] [x] [x] [] [] [] [x]    Stand to Sit [] [] [] [] [] [] [] [x] [] [] [x]     [] [] [] [] [] [] [] [] [] [] []    I=Independent, Mod I=Modified Independent, S=Supervision, SBA=Standby Assistance, CGA=Contact Guard Assistance,   Min=Minimal Assistance, Mod=Moderate Assistance, Max=Maximal Assistance, Total=Total Assistance, NT=Not Tested    GAIT: I Mod I S SBA CGA Min Mod Max Total  NT x2 Comments:   Level of Assistance [] [] [] [] [] [x] [x] [] [] [] [x]    Distance 3  feet    DME Gait Belt and Rolling Walker    Gait Quality Decreased khalida , Decreased step clearance, and Decreased step length    Weightbearing Status Restrictions/Precautions  Restrictions/Precautions: Fall Risk    Stairs      I=Independent, Mod I=Modified Independent, S=Supervision, SBA=Standby Assistance, CGA=Contact Guard Assistance,   Min=Minimal Assistance, Mod=Moderate Assistance, Max=Maximal Assistance, Total=Total 
inconsistently answers questions, follows commands  Cognition: unable to report history, disoriented    Prior Dysphagia History: None per patient/chart review  Prior Instrumental Assessment:  None per patient/chart review    Current Diet:  Regular Consistency  Thin Liquids    Respiratory Status: Room air    Pain:  Patient does not c/o pain    OBJECTIVE    Oral Motor Assessment: Labial: no impairment  Lingual: no impairment  Dentition: limited and difficult to assess secondary to patient difficulty following directions, she reports she has implants.   Oral Hygiene: adequate  Vocal quality: WFL  Volitional cough: Unable to adequately assess - patient did not follow commands or perform activity required.  Oropharyngeal Assessment: Patient agreeable to PO intake, confusion impacting evaluation. Patient consumed approximately 4 oz of thin liquid via cup sip, functional oral acceptance from cup, no overt indications of airway compromise as can be assessed at bedside. Patient continued to defer additional liquid trials as she \"wants to save the water for the dogs.\" When presented with pureed solids patient with appropriate labial seal for acceptance from spoon, timely oral phase with complete clearance, no residue or pharyngeal symptoms observed. On x 1 trial of soft and bite sized solids patient with prolonged mastication and moderate oral residue, stating \"you know I can't chew this.\" Patient able to clear bolus after presentation of puree. RN entered room to administer medication, ST suggested floated in puree. Patient unable to clear pill with x 3 additional puree presentations, pill remained on tongue. After fourth puree bolus administration patient was able to clear pill, reported sensation of clearance, pill no longer observed in oral cavity. Verbalized to RN that ST is recommending pills crushed in puree going forward, RN verbalized understanding. Patient verbalized acceptance of pureed solid recommendations from 
Activities  Decreased Activity Tolerance  Decreased AROM/PROM  Decreased Balance  Decreased Cognition  Decreased Coordination  Decreased Safety Awareness  Decreased Strength  Decreased Transfer Abilities  Increased Pain   INTERVENTIONS PLANNED:  (Benefits and precautions of occupational therapy have been discussed with the patient.)  Self Care Training  Therapeutic Activity  Therapeutic Exercise/HEP  Neuromuscular Re-education  Manual Therapy  Education         TREATMENT:     EVALUATION: MODERATE COMPLEXITY: (Untimed Charge)  The initial evaluation charge encompasses clinical chart review, objective assessment, interpretation of assessment, and skilled monitoring of the patient's response to treatment in order to develop a plan of care.     TREATMENT:   Co-Treatment PT/OT necessary due to patient's decreased overall endurance/tolerance levels, as well as need for high level skilled assistance to complete functional transfers/mobility and functional tasks  Self Care (23 minutes): Patient participated in lower body dressing, self feeding, personal device care, and grooming ADLs in unsupported sitting and supine with moderate visual, verbal, manual, and tactile cueing to increase independence, decrease assistance required, and increase activity tolerance. Patient also participated in functional mobility and functional transfer training to increase independence, decrease assistance required, increase activity tolerance, and increase safety awareness.     TREATMENT GRID:      AFTER TREATMENT PRECAUTIONS: Alarm Activated, Bed/Chair Locked, Call light within reach, Chair, Needs within reach, and RN at bedside    INTERDISCIPLINARY COLLABORATION:  RN/ PCT, PT/ PTA, and OT/ VILLA    EDUCATION:  Education Given To: Patient  Education Provided: Role of Therapy  Education Outcome: Unable to demonstrate understanding    TOTAL TREATMENT DURATION AND TIME:  Time In: 0933  Time Out: 1003  Minutes: 30    JOSS MOJICA           
  POCT Glucose    Collection Time: 03/16/24  8:07 PM   Result Value Ref Range    POC Glucose 138 (H) 65 - 100 mg/dL    Performed by: Valencia    Lipase    Collection Time: 03/16/24  8:20 PM   Result Value Ref Range    Lipase 183 73 - 393 U/L   Troponin    Collection Time: 03/16/24  8:20 PM   Result Value Ref Range    Troponin, High Sensitivity 7.0 0 - 14 pg/mL   Lactic Acid    Collection Time: 03/16/24  8:20 PM   Result Value Ref Range    Lactic Acid, Plasma 0.9 0.4 - 2.0 MMOL/L   Comprehensive Metabolic Panel w/ Reflex to MG    Collection Time: 03/17/24  4:02 AM   Result Value Ref Range    Sodium 141 136 - 146 mmol/L    Potassium 3.9 3.5 - 5.1 mmol/L    Chloride 110 103 - 113 mmol/L    CO2 28 21 - 32 mmol/L    Anion Gap 3 2 - 11 mmol/L    Glucose 147 (H) 65 - 100 mg/dL    BUN 8 8 - 23 MG/DL    Creatinine 0.70 0.6 - 1.0 MG/DL    Est, Glom Filt Rate >60 >60 ml/min/1.73m2    Calcium 8.6 8.3 - 10.4 MG/DL    Total Bilirubin 0.4 0.2 - 1.1 MG/DL    ALT 18 12 - 65 U/L    AST 26 15 - 37 U/L    Alk Phosphatase 88 50 - 136 U/L    Total Protein 6.9 6.3 - 8.2 g/dL    Albumin 2.8 (L) 3.2 - 4.6 g/dL    Globulin 4.1 2.8 - 4.5 g/dL    Albumin/Globulin Ratio 0.7 0.4 - 1.6     CBC with Auto Differential    Collection Time: 03/17/24  4:02 AM   Result Value Ref Range    WBC 6.0 4.3 - 11.1 K/uL    RBC 3.61 (L) 4.05 - 5.2 M/uL    Hemoglobin 11.0 (L) 11.7 - 15.4 g/dL    Hematocrit 33.3 (L) 35.8 - 46.3 %    MCV 92.2 82 - 102 FL    MCH 30.5 26.1 - 32.9 PG    MCHC 33.0 31.4 - 35.0 g/dL    RDW 13.2 11.9 - 14.6 %    Platelets 199 150 - 450 K/uL    MPV 9.9 9.4 - 12.3 FL    nRBC 0.00 0.0 - 0.2 K/uL    Differential Type AUTOMATED      Neutrophils % 56 43 - 78 %    Lymphocytes % 23 13 - 44 %    Monocytes % 13 (H) 4.0 - 12.0 %    Eosinophils % 8 (H) 0.5 - 7.8 %    Basophils % 0 0.0 - 2.0 %    Immature Granulocytes 0 0.0 - 5.0 %    Neutrophils Absolute 3.4 1.7 - 8.2 K/UL    Lymphocytes Absolute 1.4 0.5 - 4.6 K/UL    Monocytes Absolute 0.8 0.1 - 
tablet 0.1 mg  0.1 mg Oral Q4H PRN    ziprasidone (GEODON) 10 mg in sterile water 0.5 mL injection  10 mg IntraMUSCular Q6H PRN    morphine injection 2 mg  2 mg IntraVENous Q4H PRN    naloxone (NARCAN) injection 0.4 mg  0.4 mg IntraVENous PRN    hydrOXYzine HCl (ATARAX) tablet 25 mg  25 mg Oral Nightly PRN    sodium chloride flush 0.9 % injection 5-40 mL  5-40 mL IntraVENous 2 times per day    sodium chloride flush 0.9 % injection 5-40 mL  5-40 mL IntraVENous PRN    0.9 % sodium chloride infusion   IntraVENous PRN    potassium chloride (KLOR-CON M) extended release tablet 40 mEq  40 mEq Oral PRN    Or    potassium bicarb-citric acid (EFFER-K) effervescent tablet 40 mEq  40 mEq Oral PRN    Or    potassium chloride 10 mEq/100 mL IVPB (Peripheral Line)  10 mEq IntraVENous PRN    magnesium sulfate 2000 mg in 50 mL IVPB premix  2,000 mg IntraVENous PRN    ondansetron (ZOFRAN-ODT) disintegrating tablet 4 mg  4 mg Oral Q8H PRN    Or    ondansetron (ZOFRAN) injection 4 mg  4 mg IntraVENous Q6H PRN    polyethylene glycol (GLYCOLAX) packet 17 g  17 g Oral Daily PRN    acetaminophen (TYLENOL) tablet 650 mg  650 mg Oral Q6H PRN    Or    acetaminophen (TYLENOL) suppository 650 mg  650 mg Rectal Q6H PRN    metroNIDAZOLE (FLAGYL) 500 mg in 0.9% NaCl 100 mL IVPB premix  500 mg IntraVENous Q8H    insulin lispro (HUMALOG) injection vial 0-4 Units  0-4 Units SubCUTAneous TID WC    insulin lispro (HUMALOG) injection vial 0-4 Units  0-4 Units SubCUTAneous Nightly    glucose chewable tablet 16 g  4 tablet Oral PRN    dextrose bolus 10% 125 mL  125 mL IntraVENous PRN    Or    dextrose bolus 10% 250 mL  250 mL IntraVENous PRN    Glucagon Emergency KIT 1 mg  1 mg SubCUTAneous PRN    dextrose 10 % infusion   IntraVENous Continuous PRN       Signed:  TRISHA FLORES MD    Part of this note may have been written by using a voice dictation software.  The note has been proof read but may still contain some grammatical/other typographical errors.

## 2024-03-21 ENCOUNTER — CARE COORDINATION (OUTPATIENT)
Dept: CARE COORDINATION | Facility: CLINIC | Age: 89
End: 2024-03-21

## 2024-03-21 NOTE — CARE COORDINATION
No transitions of care outreach indicated at this time. Patient is LTC resident at [facility] and will be managed by facility.

## 2025-01-02 ENCOUNTER — HOSPITAL ENCOUNTER (INPATIENT)
Age: 89
LOS: 8 days | Discharge: NURSING, REHAB OR LONG-TERM CARE | DRG: 871 | End: 2025-01-10
Attending: EMERGENCY MEDICINE | Admitting: FAMILY MEDICINE
Payer: MEDICARE

## 2025-01-02 ENCOUNTER — APPOINTMENT (OUTPATIENT)
Dept: GENERAL RADIOLOGY | Age: 89
DRG: 871 | End: 2025-01-02
Payer: MEDICARE

## 2025-01-02 DIAGNOSIS — N39.0 URINARY TRACT INFECTION IN FEMALE: ICD-10-CM

## 2025-01-02 DIAGNOSIS — N17.9 AKI (ACUTE KIDNEY INJURY) (HCC): ICD-10-CM

## 2025-01-02 DIAGNOSIS — R73.9 HYPERGLYCEMIA: Primary | ICD-10-CM

## 2025-01-02 PROBLEM — A41.9 SEPSIS (HCC): Status: ACTIVE | Noted: 2025-01-02

## 2025-01-02 PROBLEM — H91.93 BILATERAL HEARING LOSS: Chronic | Status: ACTIVE | Noted: 2018-03-05

## 2025-01-02 PROBLEM — Z66 DNR (DO NOT RESUSCITATE): Status: ACTIVE | Noted: 2025-01-02

## 2025-01-02 PROBLEM — E86.0 DEHYDRATION: Status: ACTIVE | Noted: 2025-01-02

## 2025-01-02 PROBLEM — F03.90 DEMENTIA (HCC): Chronic | Status: ACTIVE | Noted: 2024-03-17

## 2025-01-02 PROBLEM — G93.41 ACUTE METABOLIC ENCEPHALOPATHY: Status: ACTIVE | Noted: 2025-01-02

## 2025-01-02 PROBLEM — N30.00 ACUTE CYSTITIS WITHOUT HEMATURIA: Status: ACTIVE | Noted: 2025-01-02

## 2025-01-02 LAB
ALBUMIN SERPL-MCNC: 2.1 G/DL (ref 3.2–4.6)
ALBUMIN/GLOB SERPL: 0.4 (ref 1–1.9)
ALP SERPL-CCNC: 142 U/L (ref 35–104)
ALT SERPL-CCNC: 15 U/L (ref 8–45)
ANION GAP SERPL CALC-SCNC: 11 MMOL/L (ref 7–16)
APPEARANCE UR: ABNORMAL
AST SERPL-CCNC: 27 U/L (ref 15–37)
B PERT DNA SPEC QL NAA+PROBE: NOT DETECTED
BACTERIA URNS QL MICRO: ABNORMAL /HPF
BASE EXCESS BLDV CALC-SCNC: 6.9 MMOL/L
BASOPHILS # BLD: 0 K/UL (ref 0–0.2)
BASOPHILS NFR BLD: 0 % (ref 0–2)
BILIRUB SERPL-MCNC: <0.2 MG/DL (ref 0–1.2)
BILIRUB UR QL: NEGATIVE
BORDETELLA PARAPERTUSSIS BY PCR: NOT DETECTED
BUN SERPL-MCNC: 67 MG/DL (ref 8–23)
C PNEUM DNA SPEC QL NAA+PROBE: NOT DETECTED
CALCIUM SERPL-MCNC: 9.2 MG/DL (ref 8.8–10.2)
CASTS URNS QL MICRO: ABNORMAL /LPF
CHLORIDE SERPL-SCNC: 118 MMOL/L (ref 98–107)
CO2 SERPL-SCNC: 27 MMOL/L (ref 20–29)
COLOR UR: ABNORMAL
CREAT SERPL-MCNC: 2.59 MG/DL (ref 0.6–1.1)
CRYSTALS URNS QL MICRO: 0 /LPF
DIFFERENTIAL METHOD BLD: ABNORMAL
EKG ATRIAL RATE: 92 BPM
EKG DIAGNOSIS: NORMAL
EKG P AXIS: 61 DEGREES
EKG P-R INTERVAL: 156 MS
EKG Q-T INTERVAL: 336 MS
EKG QRS DURATION: 95 MS
EKG QTC CALCULATION (BAZETT): 416 MS
EKG R AXIS: -13 DEGREES
EKG T AXIS: 87 DEGREES
EKG VENTRICULAR RATE: 92 BPM
EOSINOPHIL # BLD: 0 K/UL (ref 0–0.8)
EOSINOPHIL NFR BLD: 0 % (ref 0.5–7.8)
EPI CELLS #/AREA URNS HPF: ABNORMAL /HPF
ERYTHROCYTE [DISTWIDTH] IN BLOOD BY AUTOMATED COUNT: 13.3 % (ref 11.9–14.6)
FLUAV SUBTYP SPEC NAA+PROBE: NOT DETECTED
FLUBV RNA SPEC QL NAA+PROBE: NOT DETECTED
GAS FLOW.O2 O2 DELIVERY SYS: ABNORMAL
GLOBULIN SER CALC-MCNC: 4.8 G/DL (ref 2.3–3.5)
GLUCOSE BLD STRIP.AUTO-MCNC: 325 MG/DL (ref 65–100)
GLUCOSE BLD STRIP.AUTO-MCNC: 443 MG/DL (ref 65–100)
GLUCOSE BLD STRIP.AUTO-MCNC: 469 MG/DL (ref 65–100)
GLUCOSE SERPL-MCNC: 758 MG/DL (ref 70–99)
GLUCOSE UR STRIP.AUTO-MCNC: >1000 MG/DL
HADV DNA SPEC QL NAA+PROBE: NOT DETECTED
HCO3 BLDV-SCNC: 34.3 MMOL/L (ref 23–28)
HCOV 229E RNA SPEC QL NAA+PROBE: NOT DETECTED
HCOV HKU1 RNA SPEC QL NAA+PROBE: NOT DETECTED
HCOV NL63 RNA SPEC QL NAA+PROBE: NOT DETECTED
HCOV OC43 RNA SPEC QL NAA+PROBE: NOT DETECTED
HCT VFR BLD AUTO: 39.6 % (ref 35.8–46.3)
HGB BLD-MCNC: 12.1 G/DL (ref 11.7–15.4)
HGB UR QL STRIP: ABNORMAL
HMPV RNA SPEC QL NAA+PROBE: NOT DETECTED
HPIV1 RNA SPEC QL NAA+PROBE: NOT DETECTED
HPIV2 RNA SPEC QL NAA+PROBE: NOT DETECTED
HPIV3 RNA SPEC QL NAA+PROBE: NOT DETECTED
HPIV4 RNA SPEC QL NAA+PROBE: NOT DETECTED
IMM GRANULOCYTES # BLD AUTO: 0.1 K/UL (ref 0–0.5)
IMM GRANULOCYTES NFR BLD AUTO: 1 % (ref 0–5)
KETONES UR QL STRIP.AUTO: NEGATIVE MG/DL
LACTATE SERPL-SCNC: 1.5 MMOL/L (ref 0.5–2)
LEUKOCYTE ESTERASE UR QL STRIP.AUTO: ABNORMAL
LYMPHOCYTES # BLD: 1.2 K/UL (ref 0.5–4.6)
LYMPHOCYTES NFR BLD: 13 % (ref 13–44)
M PNEUMO DNA SPEC QL NAA+PROBE: NOT DETECTED
MCH RBC QN AUTO: 31.4 PG (ref 26.1–32.9)
MCHC RBC AUTO-ENTMCNC: 30.6 G/DL (ref 31.4–35)
MCV RBC AUTO: 102.9 FL (ref 82–102)
MONOCYTES # BLD: 1.2 K/UL (ref 0.1–1.3)
MONOCYTES NFR BLD: 12 % (ref 4–12)
MUCOUS THREADS URNS QL MICRO: 0 /LPF
NEUTS SEG # BLD: 7.1 K/UL (ref 1.7–8.2)
NEUTS SEG NFR BLD: 74 % (ref 43–78)
NITRITE UR QL STRIP.AUTO: NEGATIVE
NRBC # BLD: 0 K/UL (ref 0–0.2)
NT PRO BNP: 733 PG/ML (ref 0–450)
OTHER OBSERVATIONS: ABNORMAL
PCO2 BLDV: 59.9 MMHG (ref 41–51)
PH BLDV: 7.37 (ref 7.32–7.42)
PH UR STRIP: 5 (ref 5–9)
PLATELET # BLD AUTO: 205 K/UL (ref 150–450)
PMV BLD AUTO: 11.9 FL (ref 9.4–12.3)
PO2 BLDV: 100 MMHG
POC FIO2: 6
POTASSIUM SERPL-SCNC: 5.3 MMOL/L (ref 3.5–5.1)
PROT SERPL-MCNC: 6.9 G/DL (ref 6.3–8.2)
PROT UR STRIP-MCNC: 100 MG/DL
RBC # BLD AUTO: 3.85 M/UL (ref 4.05–5.2)
RBC #/AREA URNS HPF: ABNORMAL /HPF
RSV RNA SPEC QL NAA+PROBE: NOT DETECTED
RV+EV RNA SPEC QL NAA+PROBE: NOT DETECTED
SAO2 % BLDV: 97.3 % (ref 65–88)
SARS-COV-2 RNA RESP QL NAA+PROBE: NOT DETECTED
SERVICE CMNT-IMP: ABNORMAL
SODIUM SERPL-SCNC: 156 MMOL/L (ref 136–145)
SP GR UR REFRACTOMETRY: 1.02 (ref 1–1.02)
SPECIMEN TYPE: ABNORMAL
TROPONIN T SERPL HS-MCNC: 36 NG/L (ref 0–14)
URINE CULTURE IF INDICATED: ABNORMAL
UROBILINOGEN UR QL STRIP.AUTO: 0.2 EU/DL (ref 0.2–1)
WBC # BLD AUTO: 9.6 K/UL (ref 4.3–11.1)
WBC URNS QL MICRO: >100 /HPF

## 2025-01-02 PROCEDURE — 83605 ASSAY OF LACTIC ACID: CPT

## 2025-01-02 PROCEDURE — 6360000002 HC RX W HCPCS: Performed by: FAMILY MEDICINE

## 2025-01-02 PROCEDURE — 84484 ASSAY OF TROPONIN QUANT: CPT

## 2025-01-02 PROCEDURE — 6370000000 HC RX 637 (ALT 250 FOR IP): Performed by: FAMILY MEDICINE

## 2025-01-02 PROCEDURE — 2580000003 HC RX 258

## 2025-01-02 PROCEDURE — 1100000000 HC RM PRIVATE

## 2025-01-02 PROCEDURE — 6370000000 HC RX 637 (ALT 250 FOR IP): Performed by: EMERGENCY MEDICINE

## 2025-01-02 PROCEDURE — 93010 ELECTROCARDIOGRAM REPORT: CPT | Performed by: INTERNAL MEDICINE

## 2025-01-02 PROCEDURE — 96375 TX/PRO/DX INJ NEW DRUG ADDON: CPT

## 2025-01-02 PROCEDURE — 87088 URINE BACTERIA CULTURE: CPT

## 2025-01-02 PROCEDURE — 2500000003 HC RX 250 WO HCPCS: Performed by: EMERGENCY MEDICINE

## 2025-01-02 PROCEDURE — 6360000002 HC RX W HCPCS: Performed by: EMERGENCY MEDICINE

## 2025-01-02 PROCEDURE — 80053 COMPREHEN METABOLIC PANEL: CPT

## 2025-01-02 PROCEDURE — 84295 ASSAY OF SERUM SODIUM: CPT

## 2025-01-02 PROCEDURE — 82803 BLOOD GASES ANY COMBINATION: CPT

## 2025-01-02 PROCEDURE — 93005 ELECTROCARDIOGRAM TRACING: CPT | Performed by: EMERGENCY MEDICINE

## 2025-01-02 PROCEDURE — 87186 SC STD MICRODIL/AGAR DIL: CPT

## 2025-01-02 PROCEDURE — 94640 AIRWAY INHALATION TREATMENT: CPT

## 2025-01-02 PROCEDURE — 87040 BLOOD CULTURE FOR BACTERIA: CPT

## 2025-01-02 PROCEDURE — 96374 THER/PROPH/DIAG INJ IV PUSH: CPT

## 2025-01-02 PROCEDURE — 2700000000 HC OXYGEN THERAPY PER DAY

## 2025-01-02 PROCEDURE — 2580000003 HC RX 258: Performed by: EMERGENCY MEDICINE

## 2025-01-02 PROCEDURE — 2500000003 HC RX 250 WO HCPCS: Performed by: FAMILY MEDICINE

## 2025-01-02 PROCEDURE — 83880 ASSAY OF NATRIURETIC PEPTIDE: CPT

## 2025-01-02 PROCEDURE — 71045 X-RAY EXAM CHEST 1 VIEW: CPT

## 2025-01-02 PROCEDURE — 85025 COMPLETE CBC W/AUTO DIFF WBC: CPT

## 2025-01-02 PROCEDURE — 96361 HYDRATE IV INFUSION ADD-ON: CPT

## 2025-01-02 PROCEDURE — 36415 COLL VENOUS BLD VENIPUNCTURE: CPT

## 2025-01-02 PROCEDURE — 82962 GLUCOSE BLOOD TEST: CPT

## 2025-01-02 PROCEDURE — 81001 URINALYSIS AUTO W/SCOPE: CPT

## 2025-01-02 PROCEDURE — 99285 EMERGENCY DEPT VISIT HI MDM: CPT

## 2025-01-02 PROCEDURE — 87086 URINE CULTURE/COLONY COUNT: CPT

## 2025-01-02 PROCEDURE — 0202U NFCT DS 22 TRGT SARS-COV-2: CPT

## 2025-01-02 PROCEDURE — 2580000003 HC RX 258: Performed by: FAMILY MEDICINE

## 2025-01-02 RX ORDER — 0.9 % SODIUM CHLORIDE 0.9 %
1000 INTRAVENOUS SOLUTION INTRAVENOUS ONCE
Status: COMPLETED | OUTPATIENT
Start: 2025-01-02 | End: 2025-01-02

## 2025-01-02 RX ORDER — MIRTAZAPINE 15 MG/1
7.5 TABLET, FILM COATED ORAL NIGHTLY
Status: DISCONTINUED | OUTPATIENT
Start: 2025-01-02 | End: 2025-01-10 | Stop reason: HOSPADM

## 2025-01-02 RX ORDER — SODIUM CHLORIDE 9 MG/ML
INJECTION, SOLUTION INTRAVENOUS CONTINUOUS
Status: DISCONTINUED | OUTPATIENT
Start: 2025-01-02 | End: 2025-01-02

## 2025-01-02 RX ORDER — ONDANSETRON 4 MG/1
4 TABLET, ORALLY DISINTEGRATING ORAL EVERY 6 HOURS PRN
Status: DISCONTINUED | OUTPATIENT
Start: 2025-01-02 | End: 2025-01-10 | Stop reason: HOSPADM

## 2025-01-02 RX ORDER — HEPARIN SODIUM 5000 [USP'U]/ML
5000 INJECTION, SOLUTION INTRAVENOUS; SUBCUTANEOUS EVERY 8 HOURS SCHEDULED
Status: DISCONTINUED | OUTPATIENT
Start: 2025-01-02 | End: 2025-01-03

## 2025-01-02 RX ORDER — DEXTROSE MONOHYDRATE 100 MG/ML
INJECTION, SOLUTION INTRAVENOUS CONTINUOUS PRN
Status: DISCONTINUED | OUTPATIENT
Start: 2025-01-02 | End: 2025-01-10 | Stop reason: HOSPADM

## 2025-01-02 RX ORDER — DIVALPROEX SODIUM 125 MG/1
125 TABLET, DELAYED RELEASE ORAL 3 TIMES DAILY
Status: DISCONTINUED | OUTPATIENT
Start: 2025-01-02 | End: 2025-01-10 | Stop reason: HOSPADM

## 2025-01-02 RX ORDER — MIRTAZAPINE 15 MG/1
7.5 TABLET, FILM COATED ORAL NIGHTLY
COMMUNITY

## 2025-01-02 RX ORDER — SODIUM CHLORIDE 0.9 % (FLUSH) 0.9 %
5-40 SYRINGE (ML) INJECTION EVERY 12 HOURS SCHEDULED
Status: DISCONTINUED | OUTPATIENT
Start: 2025-01-02 | End: 2025-01-10 | Stop reason: HOSPADM

## 2025-01-02 RX ORDER — POLYETHYLENE GLYCOL 3350 17 G/17G
17 POWDER, FOR SOLUTION ORAL DAILY PRN
Status: DISCONTINUED | OUTPATIENT
Start: 2025-01-02 | End: 2025-01-10 | Stop reason: HOSPADM

## 2025-01-02 RX ORDER — IBUPROFEN 600 MG/1
1 TABLET ORAL PRN
Status: DISCONTINUED | OUTPATIENT
Start: 2025-01-02 | End: 2025-01-10 | Stop reason: HOSPADM

## 2025-01-02 RX ORDER — SODIUM CHLORIDE 9 MG/ML
INJECTION, SOLUTION INTRAVENOUS PRN
Status: DISCONTINUED | OUTPATIENT
Start: 2025-01-02 | End: 2025-01-10 | Stop reason: HOSPADM

## 2025-01-02 RX ORDER — DEXTROSE MONOHYDRATE 50 MG/ML
INJECTION, SOLUTION INTRAVENOUS CONTINUOUS
Status: DISCONTINUED | OUTPATIENT
Start: 2025-01-02 | End: 2025-01-03

## 2025-01-02 RX ORDER — INSULIN LISPRO 100 [IU]/ML
0-8 INJECTION, SOLUTION INTRAVENOUS; SUBCUTANEOUS
Status: DISCONTINUED | OUTPATIENT
Start: 2025-01-02 | End: 2025-01-10 | Stop reason: HOSPADM

## 2025-01-02 RX ORDER — SODIUM CHLORIDE 0.9 % (FLUSH) 0.9 %
5-40 SYRINGE (ML) INJECTION PRN
Status: DISCONTINUED | OUTPATIENT
Start: 2025-01-02 | End: 2025-01-10 | Stop reason: HOSPADM

## 2025-01-02 RX ORDER — SODIUM CHLORIDE 450 MG/100ML
INJECTION, SOLUTION INTRAVENOUS CONTINUOUS
Status: DISCONTINUED | OUTPATIENT
Start: 2025-01-02 | End: 2025-01-02

## 2025-01-02 RX ORDER — ACETAMINOPHEN 650 MG/1
650 SUPPOSITORY RECTAL
Status: COMPLETED | OUTPATIENT
Start: 2025-01-02 | End: 2025-01-02

## 2025-01-02 RX ORDER — MEMANTINE HYDROCHLORIDE 5 MG/1
10 TABLET ORAL 2 TIMES DAILY
Status: DISCONTINUED | OUTPATIENT
Start: 2025-01-02 | End: 2025-01-10 | Stop reason: HOSPADM

## 2025-01-02 RX ORDER — ONDANSETRON 2 MG/ML
4 INJECTION INTRAMUSCULAR; INTRAVENOUS EVERY 6 HOURS PRN
Status: DISCONTINUED | OUTPATIENT
Start: 2025-01-02 | End: 2025-01-10 | Stop reason: HOSPADM

## 2025-01-02 RX ORDER — AZITHROMYCIN 250 MG/1
250 TABLET, FILM COATED ORAL DAILY
Status: ON HOLD | COMMUNITY
End: 2025-01-10 | Stop reason: HOSPADM

## 2025-01-02 RX ORDER — ACETAMINOPHEN 325 MG/1
650 TABLET ORAL EVERY 6 HOURS PRN
Status: DISCONTINUED | OUTPATIENT
Start: 2025-01-02 | End: 2025-01-10 | Stop reason: HOSPADM

## 2025-01-02 RX ORDER — IPRATROPIUM BROMIDE AND ALBUTEROL SULFATE 2.5; .5 MG/3ML; MG/3ML
1 SOLUTION RESPIRATORY (INHALATION)
Status: COMPLETED | OUTPATIENT
Start: 2025-01-02 | End: 2025-01-02

## 2025-01-02 RX ORDER — CETIRIZINE HYDROCHLORIDE 10 MG/1
10 TABLET ORAL
Status: DISCONTINUED | OUTPATIENT
Start: 2025-01-03 | End: 2025-01-10 | Stop reason: HOSPADM

## 2025-01-02 RX ORDER — ACETAMINOPHEN 650 MG/1
650 SUPPOSITORY RECTAL EVERY 6 HOURS PRN
Status: DISCONTINUED | OUTPATIENT
Start: 2025-01-02 | End: 2025-01-10 | Stop reason: HOSPADM

## 2025-01-02 RX ORDER — HYDROXYZINE HYDROCHLORIDE 10 MG/1
10 TABLET, FILM COATED ORAL 2 TIMES DAILY
Status: ON HOLD | COMMUNITY
End: 2025-01-10 | Stop reason: HOSPADM

## 2025-01-02 RX ADMIN — WATER 1000 MG: 1 INJECTION INTRAMUSCULAR; INTRAVENOUS; SUBCUTANEOUS at 14:09

## 2025-01-02 RX ADMIN — SODIUM CHLORIDE, PRESERVATIVE FREE 10 ML: 5 INJECTION INTRAVENOUS at 22:21

## 2025-01-02 RX ADMIN — SODIUM CHLORIDE 1000 ML: 9 INJECTION, SOLUTION INTRAVENOUS at 15:15

## 2025-01-02 RX ADMIN — DIVALPROEX SODIUM 125 MG: 125 TABLET, DELAYED RELEASE ORAL at 22:21

## 2025-01-02 RX ADMIN — DEXTROSE MONOHYDRATE: 50 INJECTION, SOLUTION INTRAVENOUS at 19:56

## 2025-01-02 RX ADMIN — INSULIN LISPRO 6 UNITS: 100 INJECTION, SOLUTION INTRAVENOUS; SUBCUTANEOUS at 22:20

## 2025-01-02 RX ADMIN — SODIUM CHLORIDE, PRESERVATIVE FREE 10 ML: 5 INJECTION INTRAVENOUS at 17:14

## 2025-01-02 RX ADMIN — INSULIN HUMAN 10 UNITS: 100 INJECTION, SOLUTION PARENTERAL at 15:17

## 2025-01-02 RX ADMIN — INSULIN LISPRO 8 UNITS: 100 INJECTION, SOLUTION INTRAVENOUS; SUBCUTANEOUS at 17:30

## 2025-01-02 RX ADMIN — SODIUM CHLORIDE: 4.5 INJECTION, SOLUTION INTRAVENOUS at 17:13

## 2025-01-02 RX ADMIN — HEPARIN SODIUM 5000 UNITS: 5000 INJECTION INTRAVENOUS; SUBCUTANEOUS at 22:21

## 2025-01-02 RX ADMIN — IPRATROPIUM BROMIDE AND ALBUTEROL SULFATE 1 DOSE: .5; 3 SOLUTION RESPIRATORY (INHALATION) at 15:41

## 2025-01-02 RX ADMIN — HEPARIN SODIUM 5000 UNITS: 5000 INJECTION INTRAVENOUS; SUBCUTANEOUS at 17:14

## 2025-01-02 RX ADMIN — MIRTAZAPINE 7.5 MG: 15 TABLET, FILM COATED ORAL at 22:21

## 2025-01-02 RX ADMIN — MEMANTINE 10 MG: 5 TABLET ORAL at 22:21

## 2025-01-02 RX ADMIN — ACETAMINOPHEN 650 MG: 650 SUPPOSITORY RECTAL at 14:09

## 2025-01-02 ASSESSMENT — PAIN - FUNCTIONAL ASSESSMENT: PAIN_FUNCTIONAL_ASSESSMENT: ADULT NONVERBAL PAIN SCALE (NPVS)

## 2025-01-02 NOTE — ED NOTES
TRANSFER - OUT REPORT:    Verbal report given to RN on Maryuri Calderón  being transferred to [facility] for routine progression of patient care       Report consisted of patient's Situation, Background, Assessment and   Recommendations(SBAR).     Information from the following report(s) Nurse Handoff Report and Index was reviewed with the receiving nurse.    [place] Fall Assessment:    Presents to emergency department  because of falls (Syncope, seizure, or loss of consciousness): No  Age > 70: Yes  Altered Mental Status, Intoxication with alcohol or substance confusion (Disorientation, impaired judgment, poor safety awaremess, or inability to follow instructions): No  Impaired Mobility: Ambulates or transfers with assistive devices or assistance; Unable to ambulate or transer.: Yes  Nursing Judgement: Yes          Lines:   Peripheral IV 01/02/25 Distal;Left;Anterior Forearm (Active)        Opportunity for questions and clarification was provided.      Patient transported with:  Monitor, O2 @ 12 lpm, Patient-specific medications from Pharmacy, and Registered Nurse          Davi, Tracy, RN  01/02/25 4203

## 2025-01-02 NOTE — PLAN OF CARE
Problem: Safety - Adult  Goal: Free from fall injury  Outcome: Progressing  Flowsheets (Taken 1/2/2025 1734)  Free From Fall Injury: Instruct family/caregiver on patient safety     Problem: Chronic Conditions and Co-morbidities  Goal: Patient's chronic conditions and co-morbidity symptoms are monitored and maintained or improved  Outcome: Progressing     Problem: Discharge Planning  Goal: Discharge to home or other facility with appropriate resources  Outcome: Progressing     Problem: Skin/Tissue Integrity  Goal: Absence of new skin breakdown  Description: 1.  Monitor for areas of redness and/or skin breakdown  2.  Assess vascular access sites hourly  3.  Every 4-6 hours minimum:  Change oxygen saturation probe site  4.  Every 4-6 hours:  If on nasal continuous positive airway pressure, respiratory therapy assess nares and determine need for appliance change or resting period.  Outcome: Progressing     Problem: ABCDS Injury Assessment  Goal: Absence of physical injury  Outcome: Progressing

## 2025-01-02 NOTE — ED PROVIDER NOTES
Emergency Department Provider Note       PCP: No primary care provider on file.   Age: 89 y.o.   Sex: female     DISPOSITION Decision To Admit 01/02/2025 04:15:28 PM    ICD-10-CM    1. Hyperglycemia  R73.9       2. SAWYER (acute kidney injury) (HCC)  N17.9       3. Urinary tract infection in female  N39.0           Medical Decision Making     Patient is being evaluated for concern for sepsis.  She was febrile at 102.5 on arrival.  Sepsis order set has been initiated.  She was ordered empiric antibiotics.  Has been being treated for respiratory infection so presumed pneumonia at this time.  Out of concern for volume overload and no signs of septic shock I have not yet ordered IV fluid bolus.  ED Course as of 01/02/25 1615   Thu Jan 02, 2025   1428 Patient's blood gas showed no signs of DKA. [SONIA]   1500 Patient has no leukocytosis or left shift.  Lactic acid is normal.  May be viral process.  Viral swab pending. [SONIA]   1510 Patient's glucose was 758.  She has a normal anion gap.  She has been ordered IV fluids.  Creatinine is 2.59 previously normal. [SONIA]   1601 Urinalysis positive for infection.  Patient is already been given IV Rocephin. [SONIA]      ED Course User Index  [SONIA] Shaan Lamar, DO     1 or more acute illnesses that pose a threat to life or bodily function.   Discussion with external consultants.  Shared medical decision making was utilized in creating the patients health plan today.  I independently ordered and reviewed each unique test.    I reviewed external records: provider visit note from PCP.   The patients assessment required an independent historian: EMS.  The reason they were needed is dementia.  ED cardiac monitoring rhythm strip was ordered and interpreted:  sinus rhythm, no evidence of an arrhythmia  ST Segments:Nonspecific ST segments - NO STEMI   Rate: 92  I interpreted the X-rays chest x-ray is negative for cardiomegaly, vascular congestion, or pulmonary edema.  No large effusion

## 2025-01-02 NOTE — FLOWSHEET NOTE
Since Sunday, patient has mentally declined as well as failing to thrive. Room air in 80%, patient on Non-rebreather at 15L. 97% w/ non-rebreather. Normally able to talk, but is unable to talk and ambulate now. HX of deafness.

## 2025-01-02 NOTE — H&P
Hospitalist History and Physical   Admit Date:  2025  1:25 PM   Name:  Maryuri Calderón   Age:  89 y.o.  Sex:  female  :  1935   MRN:  [id number]   Room:  ER/    Presenting/Chief Complaint: Shortness of Breath and Altered Mental Status     Reason(s) for Admission: SAWYER (acute kidney injury) (HCC) [N17.9]     History of Present Illness:   Maryuri Calderón is a 89 y.o. female with dementia who presented to [facility] emergency department from her long-term care facility with report of progressive decline and failure to thrive.  Blood sugar there was found to be >500.  EMS was called and transported to the ER for further evaluation.      Vital signs on presentation were notable for temperature 102.5 F, pulse 97 and borderline /52.  CMP was pertinent for sodium 156, potassium 5.3, chloride 118, BUN 67, creatinine 2.5, glucose 758.  CBC was unremarkable, WBC 9.6 and hemoglobin 12.1.  Urinalysis showed greater than 1000 glucose, 100 protein, >100 WBCs, large leukocyte esterase.  ER provider ordered Tylenol 650 mg OH, regular insulin 10 units IV, DuoNeb treatment, 1 L normal saline bolus, IV ceftriaxone, requested hospitalist admission for further evaluation and management.    Assessment & Plan:     Acute kidney injury/dehydration  She is profoundly volume contracted as is evidenced by her BUN, creatinine, and sodium levels.  Start IVF as discussed below.  Follow daily BMP.  Hold Lasix (per hospitalist note dated 2022 this was being used for treatment of hypertension, along with amlodipine).    Hypernatremia  As discussed above, she is profoundly dehydrated.  Measured serum sodium is 158, but when corrected for her hyperglycemia it is 171.  Hospital does not carry 1/4NS, so will start 1/2NS at 125cc/hr with a goal rate of sodium correction of 0.5 mEq/L/hr.  Sodium levels will be followed every 6 hours.    Acute cystitis/sepsis, POA (fever, tachycardia)  Continue ceftriaxone

## 2025-01-03 PROBLEM — R65.20 SEVERE SEPSIS (HCC): Status: ACTIVE | Noted: 2025-01-02

## 2025-01-03 PROBLEM — E87.0 HYPERNATREMIA: Status: ACTIVE | Noted: 2025-01-03

## 2025-01-03 PROBLEM — T78.40XA ALLERGY: Status: ACTIVE | Noted: 2025-01-03

## 2025-01-03 PROBLEM — N39.0 UTI (URINARY TRACT INFECTION): Status: ACTIVE | Noted: 2025-01-03

## 2025-01-03 PROBLEM — R73.9 HYPERGLYCEMIA: Status: ACTIVE | Noted: 2025-01-03

## 2025-01-03 PROBLEM — R31.29 MICROSCOPIC HEMATURIA: Status: ACTIVE | Noted: 2025-01-03

## 2025-01-03 LAB
ANION GAP SERPL CALC-SCNC: 9 MMOL/L (ref 7–16)
ARTERIAL PATENCY WRIST A: POSITIVE
BASE EXCESS BLD CALC-SCNC: 2.4 MMOL/L
BASOPHILS # BLD: 0 K/UL (ref 0–0.2)
BASOPHILS NFR BLD: 0 % (ref 0–2)
BDY SITE: ABNORMAL
BUN SERPL-MCNC: 53 MG/DL (ref 8–23)
CALCIUM SERPL-MCNC: 9.1 MG/DL (ref 8.8–10.2)
CHLORIDE SERPL-SCNC: 121 MMOL/L (ref 98–107)
CO2 SERPL-SCNC: 28 MMOL/L (ref 20–29)
CREAT SERPL-MCNC: 1.86 MG/DL (ref 0.6–1.1)
DIFFERENTIAL METHOD BLD: ABNORMAL
EOSINOPHIL # BLD: 0.2 K/UL (ref 0–0.8)
EOSINOPHIL NFR BLD: 2 % (ref 0.5–7.8)
ERYTHROCYTE [DISTWIDTH] IN BLOOD BY AUTOMATED COUNT: 13.3 % (ref 11.9–14.6)
EST. AVERAGE GLUCOSE BLD GHB EST-MCNC: 195 MG/DL
GAS FLOW.O2 O2 DELIVERY SYS: ABNORMAL
GLUCOSE BLD STRIP.AUTO-MCNC: 243 MG/DL (ref 65–100)
GLUCOSE BLD STRIP.AUTO-MCNC: 257 MG/DL (ref 65–100)
GLUCOSE BLD STRIP.AUTO-MCNC: 294 MG/DL (ref 65–100)
GLUCOSE BLD STRIP.AUTO-MCNC: 343 MG/DL (ref 65–100)
GLUCOSE BLD STRIP.AUTO-MCNC: 392 MG/DL (ref 65–100)
GLUCOSE SERPL-MCNC: 427 MG/DL (ref 70–99)
HBA1C MFR BLD: 8.4 % (ref 0–5.6)
HCO3 BLD-SCNC: 28.3 MMOL/L (ref 22–26)
HCT VFR BLD AUTO: 43 % (ref 35.8–46.3)
HGB BLD-MCNC: 13.1 G/DL (ref 11.7–15.4)
IMM GRANULOCYTES # BLD AUTO: 0.1 K/UL (ref 0–0.5)
IMM GRANULOCYTES NFR BLD AUTO: 1 % (ref 0–5)
LYMPHOCYTES # BLD: 1.1 K/UL (ref 0.5–4.6)
LYMPHOCYTES NFR BLD: 12 % (ref 13–44)
MCH RBC QN AUTO: 31.6 PG (ref 26.1–32.9)
MCHC RBC AUTO-ENTMCNC: 30.5 G/DL (ref 31.4–35)
MCV RBC AUTO: 103.6 FL (ref 82–102)
MONOCYTES # BLD: 0.8 K/UL (ref 0.1–1.3)
MONOCYTES NFR BLD: 9 % (ref 4–12)
NEUTS SEG # BLD: 7.3 K/UL (ref 1.7–8.2)
NEUTS SEG NFR BLD: 77 % (ref 43–78)
NRBC # BLD: 0 K/UL (ref 0–0.2)
PCO2 BLD: 47.7 MMHG (ref 35–45)
PH BLD: 7.38 (ref 7.35–7.45)
PLATELET # BLD AUTO: 197 K/UL (ref 150–450)
PMV BLD AUTO: 11.3 FL (ref 9.4–12.3)
PO2 BLD: 60 MMHG (ref 75–100)
POTASSIUM SERPL-SCNC: 3.9 MMOL/L (ref 3.5–5.1)
RBC # BLD AUTO: 4.15 M/UL (ref 4.05–5.2)
RESPIRATORY RATE, POC: 17 (ref 5–40)
SAO2 % BLD: 89.6 % (ref 95–98)
SERVICE CMNT-IMP: ABNORMAL
SODIUM SERPL-SCNC: 158 MMOL/L (ref 136–145)
SODIUM SERPL-SCNC: 158 MMOL/L (ref 136–145)
SODIUM SERPL-SCNC: 160 MMOL/L (ref 136–145)
SODIUM SERPL-SCNC: 162 MMOL/L (ref 136–145)
SODIUM SERPL-SCNC: 162 MMOL/L (ref 136–145)
SPECIMEN TYPE: ABNORMAL
WBC # BLD AUTO: 9.4 K/UL (ref 4.3–11.1)

## 2025-01-03 PROCEDURE — 80048 BASIC METABOLIC PNL TOTAL CA: CPT

## 2025-01-03 PROCEDURE — 6360000002 HC RX W HCPCS: Performed by: STUDENT IN AN ORGANIZED HEALTH CARE EDUCATION/TRAINING PROGRAM

## 2025-01-03 PROCEDURE — 2580000003 HC RX 258: Performed by: INTERNAL MEDICINE

## 2025-01-03 PROCEDURE — 2500000003 HC RX 250 WO HCPCS: Performed by: STUDENT IN AN ORGANIZED HEALTH CARE EDUCATION/TRAINING PROGRAM

## 2025-01-03 PROCEDURE — 6370000000 HC RX 637 (ALT 250 FOR IP): Performed by: FAMILY MEDICINE

## 2025-01-03 PROCEDURE — 82803 BLOOD GASES ANY COMBINATION: CPT

## 2025-01-03 PROCEDURE — 85025 COMPLETE CBC W/AUTO DIFF WBC: CPT

## 2025-01-03 PROCEDURE — 6370000000 HC RX 637 (ALT 250 FOR IP): Performed by: STUDENT IN AN ORGANIZED HEALTH CARE EDUCATION/TRAINING PROGRAM

## 2025-01-03 PROCEDURE — 84295 ASSAY OF SERUM SODIUM: CPT

## 2025-01-03 PROCEDURE — 2500000003 HC RX 250 WO HCPCS: Performed by: FAMILY MEDICINE

## 2025-01-03 PROCEDURE — 2700000000 HC OXYGEN THERAPY PER DAY

## 2025-01-03 PROCEDURE — 83036 HEMOGLOBIN GLYCOSYLATED A1C: CPT

## 2025-01-03 PROCEDURE — 6360000002 HC RX W HCPCS: Performed by: FAMILY MEDICINE

## 2025-01-03 PROCEDURE — 82962 GLUCOSE BLOOD TEST: CPT

## 2025-01-03 PROCEDURE — 2580000003 HC RX 258: Performed by: STUDENT IN AN ORGANIZED HEALTH CARE EDUCATION/TRAINING PROGRAM

## 2025-01-03 PROCEDURE — 36600 WITHDRAWAL OF ARTERIAL BLOOD: CPT

## 2025-01-03 PROCEDURE — 1100000000 HC RM PRIVATE

## 2025-01-03 PROCEDURE — 36415 COLL VENOUS BLD VENIPUNCTURE: CPT

## 2025-01-03 RX ORDER — DEXTROSE MONOHYDRATE 50 MG/ML
INJECTION, SOLUTION INTRAVENOUS CONTINUOUS
Status: DISCONTINUED | OUTPATIENT
Start: 2025-01-03 | End: 2025-01-05

## 2025-01-03 RX ORDER — SODIUM CHLORIDE, SODIUM LACTATE, POTASSIUM CHLORIDE, AND CALCIUM CHLORIDE .6; .31; .03; .02 G/100ML; G/100ML; G/100ML; G/100ML
750 INJECTION, SOLUTION INTRAVENOUS ONCE
Status: COMPLETED | OUTPATIENT
Start: 2025-01-03 | End: 2025-01-04

## 2025-01-03 RX ORDER — MICONAZOLE NITRATE 20 MG/G
CREAM TOPICAL 2 TIMES DAILY
Status: DISCONTINUED | OUTPATIENT
Start: 2025-01-03 | End: 2025-01-10 | Stop reason: HOSPADM

## 2025-01-03 RX ORDER — SODIUM CHLORIDE, SODIUM LACTATE, POTASSIUM CHLORIDE, AND CALCIUM CHLORIDE .6; .31; .03; .02 G/100ML; G/100ML; G/100ML; G/100ML
250 INJECTION, SOLUTION INTRAVENOUS ONCE
Status: COMPLETED | OUTPATIENT
Start: 2025-01-03 | End: 2025-01-03

## 2025-01-03 RX ADMIN — WATER 1000 MG: 1 INJECTION INTRAMUSCULAR; INTRAVENOUS; SUBCUTANEOUS at 14:50

## 2025-01-03 RX ADMIN — DIVALPROEX SODIUM 125 MG: 125 TABLET, DELAYED RELEASE ORAL at 14:51

## 2025-01-03 RX ADMIN — INSULIN LISPRO 4 UNITS: 100 INJECTION, SOLUTION INTRAVENOUS; SUBCUTANEOUS at 21:21

## 2025-01-03 RX ADMIN — INSULIN LISPRO 8 UNITS: 100 INJECTION, SOLUTION INTRAVENOUS; SUBCUTANEOUS at 11:53

## 2025-01-03 RX ADMIN — MEMANTINE 10 MG: 5 TABLET ORAL at 09:47

## 2025-01-03 RX ADMIN — MIRTAZAPINE 7.5 MG: 15 TABLET, FILM COATED ORAL at 21:21

## 2025-01-03 RX ADMIN — MEMANTINE 10 MG: 5 TABLET ORAL at 21:21

## 2025-01-03 RX ADMIN — DIVALPROEX SODIUM 125 MG: 125 TABLET, DELAYED RELEASE ORAL at 09:47

## 2025-01-03 RX ADMIN — CETIRIZINE HYDROCHLORIDE 10 MG: 10 TABLET, FILM COATED ORAL at 21:21

## 2025-01-03 RX ADMIN — MICONAZOLE NITRATE: 20 CREAM TOPICAL at 14:51

## 2025-01-03 RX ADMIN — INSULIN LISPRO 6 UNITS: 100 INJECTION, SOLUTION INTRAVENOUS; SUBCUTANEOUS at 18:40

## 2025-01-03 RX ADMIN — SODIUM CHLORIDE, POTASSIUM CHLORIDE, SODIUM LACTATE AND CALCIUM CHLORIDE 250 ML: 600; 310; 30; 20 INJECTION, SOLUTION INTRAVENOUS at 10:43

## 2025-01-03 RX ADMIN — DEXTROSE MONOHYDRATE: 50 INJECTION, SOLUTION INTRAVENOUS at 09:05

## 2025-01-03 RX ADMIN — SODIUM CHLORIDE, PRESERVATIVE FREE 10 ML: 5 INJECTION INTRAVENOUS at 21:21

## 2025-01-03 RX ADMIN — DIVALPROEX SODIUM 125 MG: 125 TABLET, DELAYED RELEASE ORAL at 21:21

## 2025-01-03 RX ADMIN — HEPARIN SODIUM 5000 UNITS: 5000 INJECTION INTRAVENOUS; SUBCUTANEOUS at 14:50

## 2025-01-03 RX ADMIN — HEPARIN SODIUM 5000 UNITS: 5000 INJECTION INTRAVENOUS; SUBCUTANEOUS at 05:47

## 2025-01-03 RX ADMIN — SODIUM CHLORIDE, PRESERVATIVE FREE 10 ML: 5 INJECTION INTRAVENOUS at 09:48

## 2025-01-03 RX ADMIN — INSULIN LISPRO 4 UNITS: 100 INJECTION, SOLUTION INTRAVENOUS; SUBCUTANEOUS at 09:47

## 2025-01-03 RX ADMIN — MICONAZOLE NITRATE: 20 CREAM TOPICAL at 21:24

## 2025-01-03 RX ADMIN — SODIUM CHLORIDE, POTASSIUM CHLORIDE, SODIUM LACTATE AND CALCIUM CHLORIDE 750 ML: 600; 310; 30; 20 INJECTION, SOLUTION INTRAVENOUS at 15:14

## 2025-01-03 NOTE — CARE COORDINATION
CM made attempt to make contact with daughter and left a VM.  Patient is currently from [facility] and can return will need a pre-cert.  Patient has [employer] insurance.  CM will continue to follow          01/03/25 9710   Service Assessment   History Provided By Medical Record   Primary Caregiver   ([facility])   Patient's Healthcare Decision Maker is: Legal Next of Kin   PCP Verified by CM Yes   Prior Functional Level Assistance with the following:   Current Functional Level Assistance with the following:   Can patient return to prior living arrangement Yes   Ability to make needs known: Unable   Family able to assist with home care needs: Yes   Would you like for me to discuss the discharge plan with any other family members/significant others, and if so, who? Yes   Financial Resources Medicaid;Medicare   Community Resources None   Social/Functional History   Lives With Other (Comment)  ([facility])   Home Layout One level   Home Access Level entry   Bathroom Shower/Tub Walk-in shower   Bathroom Toilet Standard   Bathroom Equipment Commode   Bathroom Accessibility Accessible   Home Equipment None   Prior Level of Assist for ADLs Needs assistance   Toileting Needs assistance   Prior Level of Assist for Homemaking Needs assistance   Homemaking Responsibilities No   Ambulation Assistance Needs assistance   Prior Level of Assist for Transfers Needs assistance   Active  No   Occupation Retired   Discharge Planning   Type of Residence Skilled Nursing Facility   Living Arrangements Other (Comment)  (Skilled Nursing Home)   Current Services Prior To Admission Skilled Nursing Facility   Potential Assistance Needed Skilled Nursing Facility   DME Ordered? No   Potential Assistance Purchasing Medications No   Type of Home Care Services None   Patient expects to be discharged to: Skilled nursing facility   One/Two Story Residence One story   History of falls? 0   Services At/After Discharge   Transition of Care Consult

## 2025-01-03 NOTE — PLAN OF CARE
Problem: Safety - Adult  Goal: Free from fall injury  1/3/2025 0114 by Portia Recio RN  Outcome: Progressing  Flowsheets (Taken 1/2/2025 2010)  Free From Fall Injury: Instruct family/caregiver on patient safety  1/2/2025 1829 by Lexy Mtz RN  Outcome: Progressing  Flowsheets (Taken 1/2/2025 1731)  Free From Fall Injury: Instruct family/caregiver on patient safety     Problem: Chronic Conditions and Co-morbidities  Goal: Patient's chronic conditions and co-morbidity symptoms are monitored and maintained or improved  1/3/2025 0114 by Portia Recio RN  Outcome: Progressing  Flowsheets (Taken 1/2/2025 2010)  Care Plan - Patient's Chronic Conditions and Co-Morbidity Symptoms are Monitored and Maintained or Improved: Monitor and assess patient's chronic conditions and comorbid symptoms for stability, deterioration, or improvement  1/2/2025 1829 by Lexy Mtz RN  Outcome: Progressing     Problem: Discharge Planning  Goal: Discharge to home or other facility with appropriate resources  1/3/2025 0114 by Portia Recio RN  Outcome: Progressing  Flowsheets (Taken 1/2/2025 2010)  Discharge to home or other facility with appropriate resources:   Identify barriers to discharge with patient and caregiver   Identify discharge learning needs (meds, wound care, etc)  1/2/2025 1829 by Lexy Mtz RN  Outcome: Progressing     Problem: Skin/Tissue Integrity  Goal: Absence of new skin breakdown  Description: 1.  Monitor for areas of redness and/or skin breakdown  2.  Assess vascular access sites hourly  3.  Every 4-6 hours minimum:  Change oxygen saturation probe site  4.  Every 4-6 hours:  If on nasal continuous positive airway pressure, respiratory therapy assess nares and determine need for appliance change or resting period.  1/3/2025 0114 by Portia Recio RN  Outcome: Progressing  1/2/2025 1829 by Lexy Mtz RN  Outcome: Progressing     Problem: ABCDS Injury

## 2025-01-03 NOTE — PLAN OF CARE
Problem: Safety - Adult  Goal: Free from fall injury  1/3/2025 1005 by Lexy Mtz RN  Outcome: Progressing  Flowsheets (Taken 1/3/2025 0744)  Free From Fall Injury: Instruct family/caregiver on patient safety  1/3/2025 0114 by Portia Recio RN  Outcome: Progressing  Flowsheets (Taken 1/2/2025 2010)  Free From Fall Injury: Instruct family/caregiver on patient safety     Problem: Chronic Conditions and Co-morbidities  Goal: Patient's chronic conditions and co-morbidity symptoms are monitored and maintained or improved  1/3/2025 1005 by Lexy Mtz RN  Outcome: Progressing  Flowsheets (Taken 1/3/2025 0744)  Care Plan - Patient's Chronic Conditions and Co-Morbidity Symptoms are Monitored and Maintained or Improved: Monitor and assess patient's chronic conditions and comorbid symptoms for stability, deterioration, or improvement  1/3/2025 0114 by Portia Recio RN  Outcome: Progressing  Flowsheets (Taken 1/2/2025 2010)  Care Plan - Patient's Chronic Conditions and Co-Morbidity Symptoms are Monitored and Maintained or Improved: Monitor and assess patient's chronic conditions and comorbid symptoms for stability, deterioration, or improvement     Problem: Discharge Planning  Goal: Discharge to home or other facility with appropriate resources  1/3/2025 1005 by Lexy Mtz RN  Outcome: Progressing  Flowsheets (Taken 1/3/2025 0744)  Discharge to home or other facility with appropriate resources: Identify barriers to discharge with patient and caregiver  1/3/2025 0114 by Portia Recio RN  Outcome: Progressing  Flowsheets (Taken 1/2/2025 2010)  Discharge to home or other facility with appropriate resources:   Identify barriers to discharge with patient and caregiver   Identify discharge learning needs (meds, wound care, etc)     Problem: Skin/Tissue Integrity  Goal: Absence of new skin breakdown  Description: 1.  Monitor for areas of redness and/or skin breakdown  2.  Assess vascular

## 2025-01-04 ENCOUNTER — APPOINTMENT (OUTPATIENT)
Dept: GENERAL RADIOLOGY | Age: 89
DRG: 871 | End: 2025-01-04
Payer: MEDICARE

## 2025-01-04 LAB
ANION GAP SERPL CALC-SCNC: 11 MMOL/L (ref 7–16)
BACTERIA SPEC CULT: ABNORMAL
BACTERIA SPEC CULT: ABNORMAL
BASOPHILS # BLD: 0 K/UL (ref 0–0.2)
BASOPHILS NFR BLD: 0 % (ref 0–2)
BUN SERPL-MCNC: 43 MG/DL (ref 8–23)
CALCIUM SERPL-MCNC: 8.8 MG/DL (ref 8.8–10.2)
CHLORIDE SERPL-SCNC: 124 MMOL/L (ref 98–107)
CO2 SERPL-SCNC: 26 MMOL/L (ref 20–29)
CREAT SERPL-MCNC: 1.59 MG/DL (ref 0.6–1.1)
DIFFERENTIAL METHOD BLD: ABNORMAL
EOSINOPHIL # BLD: 0.6 K/UL (ref 0–0.8)
EOSINOPHIL NFR BLD: 6 % (ref 0.5–7.8)
ERYTHROCYTE [DISTWIDTH] IN BLOOD BY AUTOMATED COUNT: 13.3 % (ref 11.9–14.6)
GLUCOSE BLD STRIP.AUTO-MCNC: 211 MG/DL (ref 65–100)
GLUCOSE BLD STRIP.AUTO-MCNC: 214 MG/DL (ref 65–100)
GLUCOSE BLD STRIP.AUTO-MCNC: 218 MG/DL (ref 65–100)
GLUCOSE BLD STRIP.AUTO-MCNC: 302 MG/DL (ref 65–100)
GLUCOSE SERPL-MCNC: 263 MG/DL (ref 70–99)
HCT VFR BLD AUTO: 37.9 % (ref 35.8–46.3)
HGB BLD-MCNC: 11.7 G/DL (ref 11.7–15.4)
IMM GRANULOCYTES # BLD AUTO: 0.1 K/UL (ref 0–0.5)
IMM GRANULOCYTES NFR BLD AUTO: 1 % (ref 0–5)
LYMPHOCYTES # BLD: 1.7 K/UL (ref 0.5–4.6)
LYMPHOCYTES NFR BLD: 17 % (ref 13–44)
MCH RBC QN AUTO: 31.5 PG (ref 26.1–32.9)
MCHC RBC AUTO-ENTMCNC: 30.9 G/DL (ref 31.4–35)
MCV RBC AUTO: 102.2 FL (ref 82–102)
MONOCYTES # BLD: 0.8 K/UL (ref 0.1–1.3)
MONOCYTES NFR BLD: 8 % (ref 4–12)
NEUTS SEG # BLD: 7.1 K/UL (ref 1.7–8.2)
NEUTS SEG NFR BLD: 68 % (ref 43–78)
NRBC # BLD: 0 K/UL (ref 0–0.2)
PLATELET # BLD AUTO: 188 K/UL (ref 150–450)
PMV BLD AUTO: 11.7 FL (ref 9.4–12.3)
POTASSIUM SERPL-SCNC: 3.9 MMOL/L (ref 3.5–5.1)
RBC # BLD AUTO: 3.71 M/UL (ref 4.05–5.2)
SERVICE CMNT-IMP: ABNORMAL
SODIUM SERPL-SCNC: 158 MMOL/L (ref 136–145)
SODIUM SERPL-SCNC: 159 MMOL/L (ref 136–145)
SODIUM SERPL-SCNC: 160 MMOL/L (ref 136–145)
SODIUM SERPL-SCNC: 161 MMOL/L (ref 136–145)
WBC # BLD AUTO: 10.4 K/UL (ref 4.3–11.1)

## 2025-01-04 PROCEDURE — 36415 COLL VENOUS BLD VENIPUNCTURE: CPT

## 2025-01-04 PROCEDURE — 2580000003 HC RX 258: Performed by: STUDENT IN AN ORGANIZED HEALTH CARE EDUCATION/TRAINING PROGRAM

## 2025-01-04 PROCEDURE — 6370000000 HC RX 637 (ALT 250 FOR IP): Performed by: STUDENT IN AN ORGANIZED HEALTH CARE EDUCATION/TRAINING PROGRAM

## 2025-01-04 PROCEDURE — 84295 ASSAY OF SERUM SODIUM: CPT

## 2025-01-04 PROCEDURE — 94760 N-INVAS EAR/PLS OXIMETRY 1: CPT

## 2025-01-04 PROCEDURE — 71045 X-RAY EXAM CHEST 1 VIEW: CPT

## 2025-01-04 PROCEDURE — 6360000002 HC RX W HCPCS: Performed by: STUDENT IN AN ORGANIZED HEALTH CARE EDUCATION/TRAINING PROGRAM

## 2025-01-04 PROCEDURE — 80048 BASIC METABOLIC PNL TOTAL CA: CPT

## 2025-01-04 PROCEDURE — 2500000003 HC RX 250 WO HCPCS: Performed by: FAMILY MEDICINE

## 2025-01-04 PROCEDURE — 82962 GLUCOSE BLOOD TEST: CPT

## 2025-01-04 PROCEDURE — 1100000000 HC RM PRIVATE

## 2025-01-04 PROCEDURE — 6370000000 HC RX 637 (ALT 250 FOR IP): Performed by: FAMILY MEDICINE

## 2025-01-04 PROCEDURE — 2700000000 HC OXYGEN THERAPY PER DAY

## 2025-01-04 PROCEDURE — 2500000003 HC RX 250 WO HCPCS: Performed by: STUDENT IN AN ORGANIZED HEALTH CARE EDUCATION/TRAINING PROGRAM

## 2025-01-04 PROCEDURE — 85025 COMPLETE CBC W/AUTO DIFF WBC: CPT

## 2025-01-04 RX ORDER — SODIUM CHLORIDE 450 MG/100ML
INJECTION, SOLUTION INTRAVENOUS CONTINUOUS
Status: ACTIVE | OUTPATIENT
Start: 2025-01-04 | End: 2025-01-04

## 2025-01-04 RX ADMIN — INSULIN LISPRO 2 UNITS: 100 INJECTION, SOLUTION INTRAVENOUS; SUBCUTANEOUS at 21:25

## 2025-01-04 RX ADMIN — MICONAZOLE NITRATE: 20 CREAM TOPICAL at 20:45

## 2025-01-04 RX ADMIN — INSULIN LISPRO 2 UNITS: 100 INJECTION, SOLUTION INTRAVENOUS; SUBCUTANEOUS at 16:37

## 2025-01-04 RX ADMIN — ACETAMINOPHEN 650 MG: 325 TABLET ORAL at 17:04

## 2025-01-04 RX ADMIN — CETIRIZINE HYDROCHLORIDE 10 MG: 10 TABLET, FILM COATED ORAL at 20:40

## 2025-01-04 RX ADMIN — DIVALPROEX SODIUM 125 MG: 125 TABLET, DELAYED RELEASE ORAL at 10:01

## 2025-01-04 RX ADMIN — INSULIN LISPRO 4 UNITS: 100 INJECTION, SOLUTION INTRAVENOUS; SUBCUTANEOUS at 10:01

## 2025-01-04 RX ADMIN — INSULIN LISPRO 6 UNITS: 100 INJECTION, SOLUTION INTRAVENOUS; SUBCUTANEOUS at 11:12

## 2025-01-04 RX ADMIN — PHENOL 1 SPRAY: 1.5 LIQUID ORAL at 16:59

## 2025-01-04 RX ADMIN — MEMANTINE 10 MG: 5 TABLET ORAL at 20:40

## 2025-01-04 RX ADMIN — SODIUM CHLORIDE, PRESERVATIVE FREE 10 ML: 5 INJECTION INTRAVENOUS at 10:02

## 2025-01-04 RX ADMIN — WATER 1000 MG: 1 INJECTION INTRAMUSCULAR; INTRAVENOUS; SUBCUTANEOUS at 14:25

## 2025-01-04 RX ADMIN — SODIUM CHLORIDE, PRESERVATIVE FREE 10 ML: 5 INJECTION INTRAVENOUS at 20:41

## 2025-01-04 RX ADMIN — MICONAZOLE NITRATE: 20 CREAM TOPICAL at 10:08

## 2025-01-04 RX ADMIN — MEMANTINE 10 MG: 5 TABLET ORAL at 10:01

## 2025-01-04 RX ADMIN — DIVALPROEX SODIUM 125 MG: 125 TABLET, DELAYED RELEASE ORAL at 20:40

## 2025-01-04 RX ADMIN — MIRTAZAPINE 7.5 MG: 15 TABLET, FILM COATED ORAL at 20:40

## 2025-01-04 RX ADMIN — SODIUM CHLORIDE: 4.5 INJECTION, SOLUTION INTRAVENOUS at 11:07

## 2025-01-04 RX ADMIN — DIVALPROEX SODIUM 125 MG: 125 TABLET, DELAYED RELEASE ORAL at 14:25

## 2025-01-04 ASSESSMENT — PAIN DESCRIPTION - LOCATION: LOCATION: THROAT

## 2025-01-04 ASSESSMENT — PAIN DESCRIPTION - DESCRIPTORS: DESCRIPTORS: ACHING;SORE

## 2025-01-04 ASSESSMENT — PAIN SCALES - GENERAL
PAINLEVEL_OUTOF10: 2
PAINLEVEL_OUTOF10: 0

## 2025-01-04 NOTE — PLAN OF CARE
Problem: Safety - Adult  Goal: Free from fall injury  1/4/2025 0000 by Tracy Zavala RN  Outcome: Progressing  Flowsheets (Taken 1/3/2025 2000)  Free From Fall Injury: Instruct family/caregiver on patient safety  1/3/2025 1005 by Lexy Mtz RN  Outcome: Progressing  Flowsheets (Taken 1/3/2025 0744)  Free From Fall Injury: Instruct family/caregiver on patient safety     Problem: Chronic Conditions and Co-morbidities  Goal: Patient's chronic conditions and co-morbidity symptoms are monitored and maintained or improved  1/4/2025 0000 by Tracy Zavala RN  Outcome: Progressing  Flowsheets (Taken 1/3/2025 2000)  Care Plan - Patient's Chronic Conditions and Co-Morbidity Symptoms are Monitored and Maintained or Improved:   Monitor and assess patient's chronic conditions and comorbid symptoms for stability, deterioration, or improvement   Collaborate with multidisciplinary team to address chronic and comorbid conditions and prevent exacerbation or deterioration  1/3/2025 1005 by Lexy Mtz RN  Outcome: Progressing  Flowsheets (Taken 1/3/2025 0744)  Care Plan - Patient's Chronic Conditions and Co-Morbidity Symptoms are Monitored and Maintained or Improved: Monitor and assess patient's chronic conditions and comorbid symptoms for stability, deterioration, or improvement     Problem: Discharge Planning  Goal: Discharge to home or other facility with appropriate resources  1/4/2025 0000 by Tracy Zavala RN  Outcome: Progressing  Flowsheets (Taken 1/3/2025 2000)  Discharge to home or other facility with appropriate resources: Identify barriers to discharge with patient and caregiver  1/3/2025 1005 by Lexy Mtz RN  Outcome: Progressing  Flowsheets (Taken 1/3/2025 0744)  Discharge to home or other facility with appropriate resources: Identify barriers to discharge with patient and caregiver     Problem: Skin/Tissue Integrity  Goal: Absence of new skin

## 2025-01-05 LAB
ANION GAP SERPL CALC-SCNC: 11 MMOL/L (ref 7–16)
BASOPHILS # BLD: 0 K/UL (ref 0–0.2)
BASOPHILS NFR BLD: 0 % (ref 0–2)
BUN SERPL-MCNC: 32 MG/DL (ref 8–23)
C. DIFFICILE TOXIN MOLECULAR: NEGATIVE
CALCIUM SERPL-MCNC: 8.4 MG/DL (ref 8.8–10.2)
CHLORIDE SERPL-SCNC: 123 MMOL/L (ref 98–107)
CO2 SERPL-SCNC: 27 MMOL/L (ref 20–29)
CREAT SERPL-MCNC: 1.26 MG/DL (ref 0.6–1.1)
DIFFERENTIAL METHOD BLD: ABNORMAL
EOSINOPHIL # BLD: 0.5 K/UL (ref 0–0.8)
EOSINOPHIL NFR BLD: 4 % (ref 0.5–7.8)
ERYTHROCYTE [DISTWIDTH] IN BLOOD BY AUTOMATED COUNT: 13.2 % (ref 11.9–14.6)
GLUCOSE BLD STRIP.AUTO-MCNC: 234 MG/DL (ref 65–100)
GLUCOSE BLD STRIP.AUTO-MCNC: 245 MG/DL (ref 65–100)
GLUCOSE BLD STRIP.AUTO-MCNC: 249 MG/DL (ref 65–100)
GLUCOSE BLD STRIP.AUTO-MCNC: 364 MG/DL (ref 65–100)
GLUCOSE SERPL-MCNC: 274 MG/DL (ref 70–99)
HCT VFR BLD AUTO: 38.8 % (ref 35.8–46.3)
HGB BLD-MCNC: 11.8 G/DL (ref 11.7–15.4)
IMM GRANULOCYTES # BLD AUTO: 0.2 K/UL (ref 0–0.5)
IMM GRANULOCYTES NFR BLD AUTO: 1 % (ref 0–5)
LYMPHOCYTES # BLD: 1.5 K/UL (ref 0.5–4.6)
LYMPHOCYTES NFR BLD: 14 % (ref 13–44)
MCH RBC QN AUTO: 31.3 PG (ref 26.1–32.9)
MCHC RBC AUTO-ENTMCNC: 30.4 G/DL (ref 31.4–35)
MCV RBC AUTO: 102.9 FL (ref 82–102)
MONOCYTES # BLD: 0.6 K/UL (ref 0.1–1.3)
MONOCYTES NFR BLD: 5 % (ref 4–12)
NEUTS SEG # BLD: 8.3 K/UL (ref 1.7–8.2)
NEUTS SEG NFR BLD: 75 % (ref 43–78)
NRBC # BLD: 0 K/UL (ref 0–0.2)
PLATELET # BLD AUTO: 202 K/UL (ref 150–450)
PMV BLD AUTO: 11.5 FL (ref 9.4–12.3)
POTASSIUM SERPL-SCNC: 3.8 MMOL/L (ref 3.5–5.1)
RBC # BLD AUTO: 3.77 M/UL (ref 4.05–5.2)
SERVICE CMNT-IMP: ABNORMAL
SODIUM SERPL-SCNC: 158 MMOL/L (ref 136–145)
SODIUM SERPL-SCNC: 161 MMOL/L (ref 136–145)
WBC # BLD AUTO: 11 K/UL (ref 4.3–11.1)

## 2025-01-05 PROCEDURE — 6370000000 HC RX 637 (ALT 250 FOR IP): Performed by: STUDENT IN AN ORGANIZED HEALTH CARE EDUCATION/TRAINING PROGRAM

## 2025-01-05 PROCEDURE — 6370000000 HC RX 637 (ALT 250 FOR IP): Performed by: FAMILY MEDICINE

## 2025-01-05 PROCEDURE — 87493 C DIFF AMPLIFIED PROBE: CPT

## 2025-01-05 PROCEDURE — 87507 IADNA-DNA/RNA PROBE TQ 12-25: CPT

## 2025-01-05 PROCEDURE — 80048 BASIC METABOLIC PNL TOTAL CA: CPT

## 2025-01-05 PROCEDURE — 1100000000 HC RM PRIVATE

## 2025-01-05 PROCEDURE — 2500000003 HC RX 250 WO HCPCS: Performed by: STUDENT IN AN ORGANIZED HEALTH CARE EDUCATION/TRAINING PROGRAM

## 2025-01-05 PROCEDURE — 84295 ASSAY OF SERUM SODIUM: CPT

## 2025-01-05 PROCEDURE — 85025 COMPLETE CBC W/AUTO DIFF WBC: CPT

## 2025-01-05 PROCEDURE — 2580000003 HC RX 258: Performed by: INTERNAL MEDICINE

## 2025-01-05 PROCEDURE — 82962 GLUCOSE BLOOD TEST: CPT

## 2025-01-05 PROCEDURE — 2500000003 HC RX 250 WO HCPCS: Performed by: FAMILY MEDICINE

## 2025-01-05 PROCEDURE — 6360000002 HC RX W HCPCS: Performed by: STUDENT IN AN ORGANIZED HEALTH CARE EDUCATION/TRAINING PROGRAM

## 2025-01-05 PROCEDURE — 36415 COLL VENOUS BLD VENIPUNCTURE: CPT

## 2025-01-05 PROCEDURE — 92610 EVALUATE SWALLOWING FUNCTION: CPT

## 2025-01-05 RX ORDER — INSULIN GLARGINE 100 [IU]/ML
10 INJECTION, SOLUTION SUBCUTANEOUS NIGHTLY
Status: DISCONTINUED | OUTPATIENT
Start: 2025-01-05 | End: 2025-01-08

## 2025-01-05 RX ORDER — SODIUM CHLORIDE 9 MG/ML
INJECTION, SOLUTION INTRAVENOUS CONTINUOUS
Status: ACTIVE | OUTPATIENT
Start: 2025-01-05 | End: 2025-01-05

## 2025-01-05 RX ORDER — DEXTROSE MONOHYDRATE 50 MG/ML
INJECTION, SOLUTION INTRAVENOUS CONTINUOUS
Status: DISCONTINUED | OUTPATIENT
Start: 2025-01-05 | End: 2025-01-09

## 2025-01-05 RX ADMIN — SODIUM CHLORIDE, PRESERVATIVE FREE 10 ML: 5 INJECTION INTRAVENOUS at 21:00

## 2025-01-05 RX ADMIN — INSULIN LISPRO 2 UNITS: 100 INJECTION, SOLUTION INTRAVENOUS; SUBCUTANEOUS at 17:45

## 2025-01-05 RX ADMIN — DIVALPROEX SODIUM 125 MG: 125 TABLET, DELAYED RELEASE ORAL at 20:59

## 2025-01-05 RX ADMIN — PHENOL 1 SPRAY: 1.5 LIQUID ORAL at 12:33

## 2025-01-05 RX ADMIN — SODIUM CHLORIDE: 9 INJECTION, SOLUTION INTRAVENOUS at 12:38

## 2025-01-05 RX ADMIN — PHENOL 1 SPRAY: 1.5 LIQUID ORAL at 10:57

## 2025-01-05 RX ADMIN — INSULIN GLARGINE 10 UNITS: 100 INJECTION, SOLUTION SUBCUTANEOUS at 20:59

## 2025-01-05 RX ADMIN — WATER 1000 MG: 1 INJECTION INTRAMUSCULAR; INTRAVENOUS; SUBCUTANEOUS at 10:50

## 2025-01-05 RX ADMIN — CETIRIZINE HYDROCHLORIDE 10 MG: 10 TABLET, FILM COATED ORAL at 20:59

## 2025-01-05 RX ADMIN — DIVALPROEX SODIUM 125 MG: 125 TABLET, DELAYED RELEASE ORAL at 14:12

## 2025-01-05 RX ADMIN — MEMANTINE 10 MG: 5 TABLET ORAL at 20:59

## 2025-01-05 RX ADMIN — MEMANTINE 10 MG: 5 TABLET ORAL at 10:45

## 2025-01-05 RX ADMIN — MICONAZOLE NITRATE: 20 CREAM TOPICAL at 10:51

## 2025-01-05 RX ADMIN — INSULIN LISPRO 2 UNITS: 100 INJECTION, SOLUTION INTRAVENOUS; SUBCUTANEOUS at 20:59

## 2025-01-05 RX ADMIN — INSULIN LISPRO 8 UNITS: 100 INJECTION, SOLUTION INTRAVENOUS; SUBCUTANEOUS at 12:32

## 2025-01-05 RX ADMIN — INSULIN LISPRO 2 UNITS: 100 INJECTION, SOLUTION INTRAVENOUS; SUBCUTANEOUS at 10:45

## 2025-01-05 RX ADMIN — SODIUM CHLORIDE, PRESERVATIVE FREE 10 ML: 5 INJECTION INTRAVENOUS at 10:46

## 2025-01-05 RX ADMIN — MIRTAZAPINE 7.5 MG: 15 TABLET, FILM COATED ORAL at 20:59

## 2025-01-05 RX ADMIN — DIVALPROEX SODIUM 125 MG: 125 TABLET, DELAYED RELEASE ORAL at 10:45

## 2025-01-05 RX ADMIN — MICONAZOLE NITRATE: 20 CREAM TOPICAL at 21:00

## 2025-01-05 ASSESSMENT — PAIN SCALES - GENERAL
PAINLEVEL_OUTOF10: 2
PAINLEVEL_OUTOF10: 1
PAINLEVEL_OUTOF10: 0

## 2025-01-05 ASSESSMENT — PAIN DESCRIPTION - LOCATION
LOCATION: THROAT
LOCATION: THROAT

## 2025-01-05 ASSESSMENT — PAIN SCALES - WONG BAKER: WONGBAKER_NUMERICALRESPONSE: NO HURT

## 2025-01-05 NOTE — PLAN OF CARE
Problem: Safety - Adult  Goal: Free from fall injury  1/5/2025 1158 by Lexy Mtz RN  Outcome: Progressing  Flowsheets (Taken 1/5/2025 0800)  Free From Fall Injury: Instruct family/caregiver on patient safety  1/4/2025 2237 by Tracy Zavala RN  Outcome: Progressing     Problem: Chronic Conditions and Co-morbidities  Goal: Patient's chronic conditions and co-morbidity symptoms are monitored and maintained or improved  1/5/2025 1158 by Lexy Mtz RN  Outcome: Progressing  Flowsheets (Taken 1/5/2025 0800)  Care Plan - Patient's Chronic Conditions and Co-Morbidity Symptoms are Monitored and Maintained or Improved: Monitor and assess patient's chronic conditions and comorbid symptoms for stability, deterioration, or improvement  1/4/2025 2237 by Tracy Zavala RN  Outcome: Progressing  Flowsheets (Taken 1/4/2025 2000)  Care Plan - Patient's Chronic Conditions and Co-Morbidity Symptoms are Monitored and Maintained or Improved:   Monitor and assess patient's chronic conditions and comorbid symptoms for stability, deterioration, or improvement   Collaborate with multidisciplinary team to address chronic and comorbid conditions and prevent exacerbation or deterioration     Problem: Discharge Planning  Goal: Discharge to home or other facility with appropriate resources  1/5/2025 1158 by Lexy Mtz RN  Outcome: Progressing  Flowsheets (Taken 1/5/2025 0800)  Discharge to home or other facility with appropriate resources: Identify barriers to discharge with patient and caregiver  1/4/2025 2237 by Tracy Zavala RN  Outcome: Progressing  Flowsheets (Taken 1/4/2025 2000)  Discharge to home or other facility with appropriate resources:   Identify barriers to discharge with patient and caregiver   Arrange for needed discharge resources and transportation as appropriate     Problem: Skin/Tissue Integrity  Goal: Absence of new skin breakdown  Description: 1.  Monitor for

## 2025-01-05 NOTE — CONSULTS
[facility] CONSULT NOTE    Admission Date:  1/2/2025    Admission Diagnosis:  Hyperglycemia [R73.9]  SAWYER (acute kidney injury) (HCC) [N17.9]  Urinary tract infection in female [N39.0]    Consulting physician: Eben  Reason for consult: SAWYER and hypernatremia    Subjective:   History of Present Illness: Maryuri Calderón is a 89 y.o. female with dementia who presented to [facility] emergency department from her long-term care facility with report of progressive decline and failure to thrive.  Blood sugar there was found to be >500.  EMS was called and transported to the ER for further evaluation.  She was found to be in renal failure with fairly significant hypernatremia. Treated with fluids with improvement in renal function. She is on antibiotics for UTI. Sugars have been brought under control.     Past Medical History:   Diagnosis Date    Broken bones     Diabetes (HCC)     Ear problems     Hearing problem     History of mammogram 2015    History of Papanicolaou smear of cervix >5 years ago    Hypertension     Seasonal allergic rhinitis due to pollen 9/21/2020      Past Surgical History:   Procedure Laterality Date    COCHLEAR IMPLANT  02/21/2017    [place], [place]    COLONOSCOPY  2008    ORTHOPEDIC SURGERY  2008    lumbar laminectomy; [place], [place]    SIGMOIDOSCOPY N/A 3/18/2024    SIGMOIDOSCOPY DIAGNOSTIC FLEXIBLE performed by Darlene Bender MD at [facility] ENDOSCOPY    TUBAL LIGATION        Current Facility-Administered Medications   Medication Dose Route Frequency    insulin glargine (LANTUS) injection vial 10 Units  10 Units SubCUTAneous Nightly    cefTRIAXone (ROCEPHIN) 1,000 mg in sterile water 10 mL IV syringe  1,000 mg IntraVENous Q24H    phenol 1.4 % mouth spray 1 spray  1 spray Mouth/Throat Q2H PRN    [Held by provider] dextrose 5 % solution   IntraVENous Continuous    miconazole (MICOTIN) 2 % cream   Topical BID    sodium chloride flush 0.9 % injection 5-40 mL  5-40

## 2025-01-05 NOTE — PLAN OF CARE
Problem: Safety - Adult  Goal: Free from fall injury  Outcome: Progressing     Problem: Chronic Conditions and Co-morbidities  Goal: Patient's chronic conditions and co-morbidity symptoms are monitored and maintained or improved  Outcome: Progressing  Flowsheets (Taken 1/4/2025 2000)  Care Plan - Patient's Chronic Conditions and Co-Morbidity Symptoms are Monitored and Maintained or Improved:   Monitor and assess patient's chronic conditions and comorbid symptoms for stability, deterioration, or improvement   Collaborate with multidisciplinary team to address chronic and comorbid conditions and prevent exacerbation or deterioration     Problem: Discharge Planning  Goal: Discharge to home or other facility with appropriate resources  Outcome: Progressing  Flowsheets (Taken 1/4/2025 2000)  Discharge to home or other facility with appropriate resources:   Identify barriers to discharge with patient and caregiver   Arrange for needed discharge resources and transportation as appropriate     Problem: Skin/Tissue Integrity  Goal: Absence of new skin breakdown  Description: 1.  Monitor for areas of redness and/or skin breakdown  2.  Assess vascular access sites hourly  3.  Every 4-6 hours minimum:  Change oxygen saturation probe site  4.  Every 4-6 hours:  If on nasal continuous positive airway pressure, respiratory therapy assess nares and determine need for appliance change or resting period.  Outcome: Progressing     Problem: ABCDS Injury Assessment  Goal: Absence of physical injury  Outcome: Progressing

## 2025-01-06 LAB
ANION GAP SERPL CALC-SCNC: 8 MMOL/L (ref 7–16)
BASOPHILS # BLD: 0 K/UL (ref 0–0.2)
BASOPHILS NFR BLD: 0 % (ref 0–2)
BUN SERPL-MCNC: 35 MG/DL (ref 8–23)
CALCIUM SERPL-MCNC: 8.3 MG/DL (ref 8.8–10.2)
CHLORIDE SERPL-SCNC: 128 MMOL/L (ref 98–107)
CO2 SERPL-SCNC: 29 MMOL/L (ref 20–29)
CREAT SERPL-MCNC: 1.24 MG/DL (ref 0.6–1.1)
DIFFERENTIAL METHOD BLD: ABNORMAL
EOSINOPHIL # BLD: 0.4 K/UL (ref 0–0.8)
EOSINOPHIL NFR BLD: 4 % (ref 0.5–7.8)
ERYTHROCYTE [DISTWIDTH] IN BLOOD BY AUTOMATED COUNT: 13.2 % (ref 11.9–14.6)
GASTROINTESTINAL PATHOGEN PANEL: NORMAL
GLUCOSE BLD STRIP.AUTO-MCNC: 203 MG/DL (ref 65–100)
GLUCOSE BLD STRIP.AUTO-MCNC: 263 MG/DL (ref 65–100)
GLUCOSE BLD STRIP.AUTO-MCNC: 264 MG/DL (ref 65–100)
GLUCOSE BLD STRIP.AUTO-MCNC: 338 MG/DL (ref 65–100)
GLUCOSE SERPL-MCNC: 279 MG/DL (ref 70–99)
HCT VFR BLD AUTO: 35.5 % (ref 35.8–46.3)
HGB BLD-MCNC: 10.8 G/DL (ref 11.7–15.4)
IMM GRANULOCYTES # BLD AUTO: 0.1 K/UL (ref 0–0.5)
IMM GRANULOCYTES NFR BLD AUTO: 1 % (ref 0–5)
LYMPHOCYTES # BLD: 1.3 K/UL (ref 0.5–4.6)
LYMPHOCYTES NFR BLD: 16 % (ref 13–44)
MCH RBC QN AUTO: 31.5 PG (ref 26.1–32.9)
MCHC RBC AUTO-ENTMCNC: 30.4 G/DL (ref 31.4–35)
MCV RBC AUTO: 103.5 FL (ref 82–102)
MONOCYTES # BLD: 0.5 K/UL (ref 0.1–1.3)
MONOCYTES NFR BLD: 6 % (ref 4–12)
NEUTS SEG # BLD: 5.7 K/UL (ref 1.7–8.2)
NEUTS SEG NFR BLD: 72 % (ref 43–78)
NRBC # BLD: 0.02 K/UL (ref 0–0.2)
PLATELET # BLD AUTO: 196 K/UL (ref 150–450)
PMV BLD AUTO: 11.6 FL (ref 9.4–12.3)
POTASSIUM SERPL-SCNC: 3.6 MMOL/L (ref 3.5–5.1)
RBC # BLD AUTO: 3.43 M/UL (ref 4.05–5.2)
SERVICE CMNT-IMP: ABNORMAL
SODIUM SERPL-SCNC: 165 MMOL/L (ref 136–145)
WBC # BLD AUTO: 7.9 K/UL (ref 4.3–11.1)

## 2025-01-06 PROCEDURE — 36415 COLL VENOUS BLD VENIPUNCTURE: CPT

## 2025-01-06 PROCEDURE — 76937 US GUIDE VASCULAR ACCESS: CPT

## 2025-01-06 PROCEDURE — 2500000003 HC RX 250 WO HCPCS: Performed by: STUDENT IN AN ORGANIZED HEALTH CARE EDUCATION/TRAINING PROGRAM

## 2025-01-06 PROCEDURE — 82962 GLUCOSE BLOOD TEST: CPT

## 2025-01-06 PROCEDURE — 6370000000 HC RX 637 (ALT 250 FOR IP): Performed by: FAMILY MEDICINE

## 2025-01-06 PROCEDURE — 92526 ORAL FUNCTION THERAPY: CPT

## 2025-01-06 PROCEDURE — 2580000003 HC RX 258: Performed by: NURSE PRACTITIONER

## 2025-01-06 PROCEDURE — 6370000000 HC RX 637 (ALT 250 FOR IP): Performed by: STUDENT IN AN ORGANIZED HEALTH CARE EDUCATION/TRAINING PROGRAM

## 2025-01-06 PROCEDURE — 6360000002 HC RX W HCPCS: Performed by: STUDENT IN AN ORGANIZED HEALTH CARE EDUCATION/TRAINING PROGRAM

## 2025-01-06 PROCEDURE — 85025 COMPLETE CBC W/AUTO DIFF WBC: CPT

## 2025-01-06 PROCEDURE — 80048 BASIC METABOLIC PNL TOTAL CA: CPT

## 2025-01-06 PROCEDURE — 2500000003 HC RX 250 WO HCPCS: Performed by: FAMILY MEDICINE

## 2025-01-06 PROCEDURE — 1100000000 HC RM PRIVATE

## 2025-01-06 RX ORDER — LOPERAMIDE HYDROCHLORIDE 2 MG/1
2 CAPSULE ORAL 4 TIMES DAILY PRN
Status: DISCONTINUED | OUTPATIENT
Start: 2025-01-06 | End: 2025-01-10 | Stop reason: HOSPADM

## 2025-01-06 RX ADMIN — SODIUM CHLORIDE, PRESERVATIVE FREE 10 ML: 5 INJECTION INTRAVENOUS at 09:32

## 2025-01-06 RX ADMIN — MEMANTINE 10 MG: 5 TABLET ORAL at 09:32

## 2025-01-06 RX ADMIN — CETIRIZINE HYDROCHLORIDE 10 MG: 10 TABLET, FILM COATED ORAL at 21:23

## 2025-01-06 RX ADMIN — INSULIN LISPRO 4 UNITS: 100 INJECTION, SOLUTION INTRAVENOUS; SUBCUTANEOUS at 21:22

## 2025-01-06 RX ADMIN — DIVALPROEX SODIUM 125 MG: 125 TABLET, DELAYED RELEASE ORAL at 09:32

## 2025-01-06 RX ADMIN — MICONAZOLE NITRATE: 20 CREAM TOPICAL at 09:30

## 2025-01-06 RX ADMIN — DEXTROSE MONOHYDRATE: 50 INJECTION, SOLUTION INTRAVENOUS at 11:21

## 2025-01-06 RX ADMIN — MEMANTINE 10 MG: 5 TABLET ORAL at 21:23

## 2025-01-06 RX ADMIN — INSULIN LISPRO 4 UNITS: 100 INJECTION, SOLUTION INTRAVENOUS; SUBCUTANEOUS at 09:38

## 2025-01-06 RX ADMIN — INSULIN LISPRO 6 UNITS: 100 INJECTION, SOLUTION INTRAVENOUS; SUBCUTANEOUS at 17:11

## 2025-01-06 RX ADMIN — MIRTAZAPINE 7.5 MG: 15 TABLET, FILM COATED ORAL at 21:23

## 2025-01-06 RX ADMIN — WATER 1000 MG: 1 INJECTION INTRAMUSCULAR; INTRAVENOUS; SUBCUTANEOUS at 09:38

## 2025-01-06 RX ADMIN — DIVALPROEX SODIUM 125 MG: 125 TABLET, DELAYED RELEASE ORAL at 21:23

## 2025-01-06 RX ADMIN — DIVALPROEX SODIUM 125 MG: 125 TABLET, DELAYED RELEASE ORAL at 14:59

## 2025-01-06 RX ADMIN — INSULIN GLARGINE 10 UNITS: 100 INJECTION, SOLUTION SUBCUTANEOUS at 22:10

## 2025-01-06 RX ADMIN — DEXTROSE MONOHYDRATE: 50 INJECTION, SOLUTION INTRAVENOUS at 18:41

## 2025-01-06 RX ADMIN — INSULIN LISPRO 2 UNITS: 100 INJECTION, SOLUTION INTRAVENOUS; SUBCUTANEOUS at 12:31

## 2025-01-06 NOTE — CARE COORDINATION
Pt chart reviewed. Per previous CM note, pt was at [facility] for rehab, will need pre cert to return. No other needs expressed at this time for case management. Discharge to be determined. MSW will follow patient for care.

## 2025-01-06 NOTE — PLAN OF CARE
Problem: Safety - Adult  Goal: Free from fall injury  1/6/2025 1018 by Lexy Mtz RN  Outcome: Progressing  Flowsheets (Taken 1/6/2025 0754)  Free From Fall Injury: Instruct family/caregiver on patient safety  1/5/2025 2217 by Tracy Zavala RN  Outcome: Progressing  Flowsheets (Taken 1/5/2025 2000)  Free From Fall Injury: Instruct family/caregiver on patient safety     Problem: Chronic Conditions and Co-morbidities  Goal: Patient's chronic conditions and co-morbidity symptoms are monitored and maintained or improved  1/6/2025 1018 by Lexy Mtz RN  Outcome: Progressing  Flowsheets (Taken 1/6/2025 0754)  Care Plan - Patient's Chronic Conditions and Co-Morbidity Symptoms are Monitored and Maintained or Improved: Monitor and assess patient's chronic conditions and comorbid symptoms for stability, deterioration, or improvement  1/5/2025 2217 by Tracy Zavala RN  Outcome: Progressing  Flowsheets (Taken 1/5/2025 2000)  Care Plan - Patient's Chronic Conditions and Co-Morbidity Symptoms are Monitored and Maintained or Improved:   Monitor and assess patient's chronic conditions and comorbid symptoms for stability, deterioration, or improvement   Collaborate with multidisciplinary team to address chronic and comorbid conditions and prevent exacerbation or deterioration     Problem: Discharge Planning  Goal: Discharge to home or other facility with appropriate resources  1/6/2025 1018 by Lexy Mtz RN  Outcome: Progressing  Flowsheets (Taken 1/6/2025 0754)  Discharge to home or other facility with appropriate resources: Identify barriers to discharge with patient and caregiver  1/5/2025 2217 by Tracy Zavala RN  Outcome: Progressing  Flowsheets (Taken 1/5/2025 2000)  Discharge to home or other facility with appropriate resources:   Identify barriers to discharge with patient and caregiver   Arrange for needed discharge resources and transportation as appropriate

## 2025-01-06 NOTE — PLAN OF CARE
Problem: Safety - Adult  Goal: Free from fall injury  1/5/2025 2217 by Tracy Zavala, RN  Outcome: Progressing  Flowsheets (Taken 1/5/2025 2000)  Free From Fall Injury: Instruct family/caregiver on patient safety  1/5/2025 1158 by Lexy Mtz RN  Outcome: Progressing  Flowsheets (Taken 1/5/2025 0800)  Free From Fall Injury: Instruct family/caregiver on patient safety     Problem: Chronic Conditions and Co-morbidities  Goal: Patient's chronic conditions and co-morbidity symptoms are monitored and maintained or improved  1/5/2025 2217 by Tracy Zavala RN  Outcome: Progressing  Flowsheets (Taken 1/5/2025 2000)  Care Plan - Patient's Chronic Conditions and Co-Morbidity Symptoms are Monitored and Maintained or Improved:   Monitor and assess patient's chronic conditions and comorbid symptoms for stability, deterioration, or improvement   Collaborate with multidisciplinary team to address chronic and comorbid conditions and prevent exacerbation or deterioration  1/5/2025 1158 by Lexy Mtz RN  Outcome: Progressing  Flowsheets (Taken 1/5/2025 0800)  Care Plan - Patient's Chronic Conditions and Co-Morbidity Symptoms are Monitored and Maintained or Improved: Monitor and assess patient's chronic conditions and comorbid symptoms for stability, deterioration, or improvement     Problem: Discharge Planning  Goal: Discharge to home or other facility with appropriate resources  1/5/2025 2217 by Tracy Zavala RN  Outcome: Progressing  Flowsheets (Taken 1/5/2025 2000)  Discharge to home or other facility with appropriate resources:   Identify barriers to discharge with patient and caregiver   Arrange for needed discharge resources and transportation as appropriate  1/5/2025 1158 by Lexy Mtz RN  Outcome: Progressing  Flowsheets (Taken 1/5/2025 0800)  Discharge to home or other facility with appropriate resources: Identify barriers to discharge with patient and caregiver

## 2025-01-07 ENCOUNTER — APPOINTMENT (OUTPATIENT)
Dept: GENERAL RADIOLOGY | Age: 89
DRG: 871 | End: 2025-01-07
Payer: MEDICARE

## 2025-01-07 LAB
ANION GAP SERPL CALC-SCNC: 11 MMOL/L (ref 7–16)
BACTERIA SPEC CULT: NORMAL
BACTERIA SPEC CULT: NORMAL
BASOPHILS # BLD: 0.03 K/UL (ref 0–0.2)
BASOPHILS NFR BLD: 0.3 % (ref 0–2)
BUN SERPL-MCNC: 23 MG/DL (ref 8–23)
CALCIUM SERPL-MCNC: 8 MG/DL (ref 8.8–10.2)
CHLORIDE SERPL-SCNC: 115 MMOL/L (ref 98–107)
CO2 SERPL-SCNC: 29 MMOL/L (ref 20–29)
CREAT SERPL-MCNC: 1.11 MG/DL (ref 0.6–1.1)
DIFFERENTIAL METHOD BLD: ABNORMAL
EOSINOPHIL # BLD: 0.39 K/UL (ref 0–0.8)
EOSINOPHIL NFR BLD: 4.3 % (ref 0.5–7.8)
ERYTHROCYTE [DISTWIDTH] IN BLOOD BY AUTOMATED COUNT: 13 % (ref 11.9–14.6)
GLUCOSE BLD STRIP.AUTO-MCNC: 164 MG/DL (ref 65–100)
GLUCOSE BLD STRIP.AUTO-MCNC: 238 MG/DL (ref 65–100)
GLUCOSE BLD STRIP.AUTO-MCNC: 289 MG/DL (ref 65–100)
GLUCOSE BLD STRIP.AUTO-MCNC: 306 MG/DL (ref 65–100)
GLUCOSE SERPL-MCNC: 301 MG/DL (ref 70–99)
HCT VFR BLD AUTO: 37.9 % (ref 35.8–46.3)
HGB BLD-MCNC: 11.6 G/DL (ref 11.7–15.4)
IMM GRANULOCYTES # BLD AUTO: 0.08 K/UL (ref 0–0.5)
IMM GRANULOCYTES NFR BLD AUTO: 0.9 % (ref 0–5)
LYMPHOCYTES # BLD: 1.76 K/UL (ref 0.5–4.6)
LYMPHOCYTES NFR BLD: 19.3 % (ref 13–44)
MAGNESIUM SERPL-MCNC: 2.2 MG/DL (ref 1.8–2.4)
MCH RBC QN AUTO: 31.5 PG (ref 26.1–32.9)
MCHC RBC AUTO-ENTMCNC: 30.6 G/DL (ref 31.4–35)
MCV RBC AUTO: 103 FL (ref 82–102)
MONOCYTES # BLD: 0.49 K/UL (ref 0.1–1.3)
MONOCYTES NFR BLD: 5.4 % (ref 4–12)
NEUTS SEG # BLD: 6.37 K/UL (ref 1.7–8.2)
NEUTS SEG NFR BLD: 69.8 % (ref 43–78)
NRBC # BLD: 0 K/UL (ref 0–0.2)
PLATELET # BLD AUTO: 194 K/UL (ref 150–450)
PMV BLD AUTO: 11.6 FL (ref 9.4–12.3)
POTASSIUM SERPL-SCNC: 3 MMOL/L (ref 3.5–5.1)
RBC # BLD AUTO: 3.68 M/UL (ref 4.05–5.2)
SERVICE CMNT-IMP: ABNORMAL
SERVICE CMNT-IMP: NORMAL
SERVICE CMNT-IMP: NORMAL
SODIUM SERPL-SCNC: 154 MMOL/L (ref 136–145)
WBC # BLD AUTO: 9.1 K/UL (ref 4.3–11.1)

## 2025-01-07 PROCEDURE — 6370000000 HC RX 637 (ALT 250 FOR IP): Performed by: NURSE PRACTITIONER

## 2025-01-07 PROCEDURE — 94640 AIRWAY INHALATION TREATMENT: CPT

## 2025-01-07 PROCEDURE — 80048 BASIC METABOLIC PNL TOTAL CA: CPT

## 2025-01-07 PROCEDURE — 92611 MOTION FLUOROSCOPY/SWALLOW: CPT

## 2025-01-07 PROCEDURE — 36415 COLL VENOUS BLD VENIPUNCTURE: CPT

## 2025-01-07 PROCEDURE — 1100000000 HC RM PRIVATE

## 2025-01-07 PROCEDURE — 2500000003 HC RX 250 WO HCPCS: Performed by: FAMILY MEDICINE

## 2025-01-07 PROCEDURE — 6370000000 HC RX 637 (ALT 250 FOR IP): Performed by: STUDENT IN AN ORGANIZED HEALTH CARE EDUCATION/TRAINING PROGRAM

## 2025-01-07 PROCEDURE — 6370000000 HC RX 637 (ALT 250 FOR IP): Performed by: FAMILY MEDICINE

## 2025-01-07 PROCEDURE — 2500000003 HC RX 250 WO HCPCS: Performed by: STUDENT IN AN ORGANIZED HEALTH CARE EDUCATION/TRAINING PROGRAM

## 2025-01-07 PROCEDURE — 82962 GLUCOSE BLOOD TEST: CPT

## 2025-01-07 PROCEDURE — 6360000002 HC RX W HCPCS: Performed by: INTERNAL MEDICINE

## 2025-01-07 PROCEDURE — 74230 X-RAY XM SWLNG FUNCJ C+: CPT

## 2025-01-07 PROCEDURE — 94760 N-INVAS EAR/PLS OXIMETRY 1: CPT

## 2025-01-07 PROCEDURE — 85025 COMPLETE CBC W/AUTO DIFF WBC: CPT

## 2025-01-07 PROCEDURE — 83735 ASSAY OF MAGNESIUM: CPT

## 2025-01-07 PROCEDURE — 2580000003 HC RX 258: Performed by: NURSE PRACTITIONER

## 2025-01-07 PROCEDURE — 6360000002 HC RX W HCPCS: Performed by: STUDENT IN AN ORGANIZED HEALTH CARE EDUCATION/TRAINING PROGRAM

## 2025-01-07 RX ORDER — POTASSIUM CHLORIDE 7.45 MG/ML
10 INJECTION INTRAVENOUS
Status: COMPLETED | OUTPATIENT
Start: 2025-01-07 | End: 2025-01-07

## 2025-01-07 RX ORDER — IPRATROPIUM BROMIDE AND ALBUTEROL SULFATE 2.5; .5 MG/3ML; MG/3ML
1 SOLUTION RESPIRATORY (INHALATION) EVERY 4 HOURS PRN
Status: DISCONTINUED | OUTPATIENT
Start: 2025-01-07 | End: 2025-01-10 | Stop reason: HOSPADM

## 2025-01-07 RX ADMIN — MIRTAZAPINE 7.5 MG: 15 TABLET, FILM COATED ORAL at 21:17

## 2025-01-07 RX ADMIN — POTASSIUM CHLORIDE 10 MEQ: 7.45 INJECTION INTRAVENOUS at 14:08

## 2025-01-07 RX ADMIN — POTASSIUM CHLORIDE 10 MEQ: 7.45 INJECTION INTRAVENOUS at 19:48

## 2025-01-07 RX ADMIN — IPRATROPIUM BROMIDE AND ALBUTEROL SULFATE 1 DOSE: 2.5; .5 SOLUTION RESPIRATORY (INHALATION) at 22:24

## 2025-01-07 RX ADMIN — BARIUM SULFATE 15 ML: 400 PASTE ORAL at 11:46

## 2025-01-07 RX ADMIN — MEMANTINE 10 MG: 5 TABLET ORAL at 21:17

## 2025-01-07 RX ADMIN — CETIRIZINE HYDROCHLORIDE 10 MG: 10 TABLET, FILM COATED ORAL at 21:17

## 2025-01-07 RX ADMIN — INSULIN GLARGINE 10 UNITS: 100 INJECTION, SOLUTION SUBCUTANEOUS at 21:17

## 2025-01-07 RX ADMIN — SODIUM CHLORIDE, PRESERVATIVE FREE 10 ML: 5 INJECTION INTRAVENOUS at 10:09

## 2025-01-07 RX ADMIN — INSULIN LISPRO 6 UNITS: 100 INJECTION, SOLUTION INTRAVENOUS; SUBCUTANEOUS at 12:40

## 2025-01-07 RX ADMIN — BARIUM SULFATE 30 ML: 0.81 POWDER, FOR SUSPENSION ORAL at 11:46

## 2025-01-07 RX ADMIN — SODIUM CHLORIDE, PRESERVATIVE FREE 10 ML: 5 INJECTION INTRAVENOUS at 09:26

## 2025-01-07 RX ADMIN — DEXTROSE MONOHYDRATE: 50 INJECTION, SOLUTION INTRAVENOUS at 03:37

## 2025-01-07 RX ADMIN — ACETAMINOPHEN 650 MG: 325 TABLET ORAL at 21:41

## 2025-01-07 RX ADMIN — MICONAZOLE NITRATE: 20 CREAM TOPICAL at 05:46

## 2025-01-07 RX ADMIN — SODIUM CHLORIDE, PRESERVATIVE FREE 10 ML: 5 INJECTION INTRAVENOUS at 21:17

## 2025-01-07 RX ADMIN — MEMANTINE 10 MG: 5 TABLET ORAL at 09:26

## 2025-01-07 RX ADMIN — DIVALPROEX SODIUM 125 MG: 125 TABLET, DELAYED RELEASE ORAL at 14:02

## 2025-01-07 RX ADMIN — BARIUM SULFATE 15 ML: 400 SUSPENSION ORAL at 11:46

## 2025-01-07 RX ADMIN — POTASSIUM CHLORIDE 10 MEQ: 7.45 INJECTION INTRAVENOUS at 17:18

## 2025-01-07 RX ADMIN — POTASSIUM CHLORIDE 10 MEQ: 7.45 INJECTION INTRAVENOUS at 16:03

## 2025-01-07 RX ADMIN — DEXTROSE MONOHYDRATE: 50 INJECTION, SOLUTION INTRAVENOUS at 13:03

## 2025-01-07 RX ADMIN — MICONAZOLE NITRATE: 20 CREAM TOPICAL at 21:18

## 2025-01-07 RX ADMIN — MICONAZOLE NITRATE: 20 CREAM TOPICAL at 09:35

## 2025-01-07 RX ADMIN — DIVALPROEX SODIUM 125 MG: 125 TABLET, DELAYED RELEASE ORAL at 21:17

## 2025-01-07 RX ADMIN — POTASSIUM CHLORIDE 10 MEQ: 7.45 INJECTION INTRAVENOUS at 12:32

## 2025-01-07 RX ADMIN — INSULIN LISPRO 4 UNITS: 100 INJECTION, SOLUTION INTRAVENOUS; SUBCUTANEOUS at 09:25

## 2025-01-07 RX ADMIN — INSULIN LISPRO 2 UNITS: 100 INJECTION, SOLUTION INTRAVENOUS; SUBCUTANEOUS at 17:07

## 2025-01-07 RX ADMIN — WATER 1000 MG: 1 INJECTION INTRAMUSCULAR; INTRAVENOUS; SUBCUTANEOUS at 10:08

## 2025-01-07 RX ADMIN — DIVALPROEX SODIUM 125 MG: 125 TABLET, DELAYED RELEASE ORAL at 09:26

## 2025-01-07 RX ADMIN — LOPERAMIDE HYDROCHLORIDE 2 MG: 2 CAPSULE ORAL at 17:07

## 2025-01-08 ENCOUNTER — APPOINTMENT (OUTPATIENT)
Dept: GENERAL RADIOLOGY | Age: 89
DRG: 871 | End: 2025-01-08
Payer: MEDICARE

## 2025-01-08 LAB
ANION GAP SERPL CALC-SCNC: 8 MMOL/L (ref 7–16)
BASOPHILS # BLD: 0.03 K/UL (ref 0–0.2)
BASOPHILS NFR BLD: 0.3 % (ref 0–2)
BUN SERPL-MCNC: 14 MG/DL (ref 8–23)
CALCIUM SERPL-MCNC: 7.7 MG/DL (ref 8.8–10.2)
CHLORIDE SERPL-SCNC: 112 MMOL/L (ref 98–107)
CO2 SERPL-SCNC: 29 MMOL/L (ref 20–29)
CREAT SERPL-MCNC: 0.96 MG/DL (ref 0.6–1.1)
DIFFERENTIAL METHOD BLD: ABNORMAL
EOSINOPHIL # BLD: 0.33 K/UL (ref 0–0.8)
EOSINOPHIL NFR BLD: 3.8 % (ref 0.5–7.8)
ERYTHROCYTE [DISTWIDTH] IN BLOOD BY AUTOMATED COUNT: 12.5 % (ref 11.9–14.6)
GLUCOSE BLD STRIP.AUTO-MCNC: 201 MG/DL (ref 65–100)
GLUCOSE BLD STRIP.AUTO-MCNC: 206 MG/DL (ref 65–100)
GLUCOSE BLD STRIP.AUTO-MCNC: 232 MG/DL (ref 65–100)
GLUCOSE BLD STRIP.AUTO-MCNC: 243 MG/DL (ref 65–100)
GLUCOSE SERPL-MCNC: 223 MG/DL (ref 70–99)
HCT VFR BLD AUTO: 33.3 % (ref 35.8–46.3)
HGB BLD-MCNC: 10.6 G/DL (ref 11.7–15.4)
IMM GRANULOCYTES # BLD AUTO: 0.06 K/UL (ref 0–0.5)
IMM GRANULOCYTES NFR BLD AUTO: 0.7 % (ref 0–5)
LYMPHOCYTES # BLD: 1.57 K/UL (ref 0.5–4.6)
LYMPHOCYTES NFR BLD: 17.9 % (ref 13–44)
MAGNESIUM SERPL-MCNC: 1.9 MG/DL (ref 1.8–2.4)
MCH RBC QN AUTO: 31.9 PG (ref 26.1–32.9)
MCHC RBC AUTO-ENTMCNC: 31.8 G/DL (ref 31.4–35)
MCV RBC AUTO: 100.3 FL (ref 82–102)
MONOCYTES # BLD: 0.48 K/UL (ref 0.1–1.3)
MONOCYTES NFR BLD: 5.5 % (ref 4–12)
NEUTS SEG # BLD: 6.29 K/UL (ref 1.7–8.2)
NEUTS SEG NFR BLD: 71.8 % (ref 43–78)
NRBC # BLD: 0 K/UL (ref 0–0.2)
PLATELET # BLD AUTO: 191 K/UL (ref 150–450)
PMV BLD AUTO: 11.7 FL (ref 9.4–12.3)
POTASSIUM SERPL-SCNC: 3.2 MMOL/L (ref 3.5–5.1)
RBC # BLD AUTO: 3.32 M/UL (ref 4.05–5.2)
SERVICE CMNT-IMP: ABNORMAL
SODIUM SERPL-SCNC: 148 MMOL/L (ref 136–145)
WBC # BLD AUTO: 8.8 K/UL (ref 4.3–11.1)

## 2025-01-08 PROCEDURE — 80048 BASIC METABOLIC PNL TOTAL CA: CPT

## 2025-01-08 PROCEDURE — 6370000000 HC RX 637 (ALT 250 FOR IP): Performed by: FAMILY MEDICINE

## 2025-01-08 PROCEDURE — 94761 N-INVAS EAR/PLS OXIMETRY MLT: CPT

## 2025-01-08 PROCEDURE — 2500000003 HC RX 250 WO HCPCS: Performed by: FAMILY MEDICINE

## 2025-01-08 PROCEDURE — 2500000003 HC RX 250 WO HCPCS: Performed by: STUDENT IN AN ORGANIZED HEALTH CARE EDUCATION/TRAINING PROGRAM

## 2025-01-08 PROCEDURE — 36415 COLL VENOUS BLD VENIPUNCTURE: CPT

## 2025-01-08 PROCEDURE — 1100000000 HC RM PRIVATE

## 2025-01-08 PROCEDURE — 85025 COMPLETE CBC W/AUTO DIFF WBC: CPT

## 2025-01-08 PROCEDURE — 6360000002 HC RX W HCPCS: Performed by: STUDENT IN AN ORGANIZED HEALTH CARE EDUCATION/TRAINING PROGRAM

## 2025-01-08 PROCEDURE — 94640 AIRWAY INHALATION TREATMENT: CPT

## 2025-01-08 PROCEDURE — 6370000000 HC RX 637 (ALT 250 FOR IP): Performed by: INTERNAL MEDICINE

## 2025-01-08 PROCEDURE — 6370000000 HC RX 637 (ALT 250 FOR IP): Performed by: NURSE PRACTITIONER

## 2025-01-08 PROCEDURE — 71045 X-RAY EXAM CHEST 1 VIEW: CPT

## 2025-01-08 PROCEDURE — 83735 ASSAY OF MAGNESIUM: CPT

## 2025-01-08 PROCEDURE — 2580000003 HC RX 258: Performed by: INTERNAL MEDICINE

## 2025-01-08 PROCEDURE — 82962 GLUCOSE BLOOD TEST: CPT

## 2025-01-08 RX ORDER — INSULIN GLARGINE 100 [IU]/ML
14 INJECTION, SOLUTION SUBCUTANEOUS NIGHTLY
Status: DISCONTINUED | OUTPATIENT
Start: 2025-01-08 | End: 2025-01-09

## 2025-01-08 RX ADMIN — SODIUM CHLORIDE, PRESERVATIVE FREE 10 ML: 5 INJECTION INTRAVENOUS at 09:10

## 2025-01-08 RX ADMIN — DIVALPROEX SODIUM 125 MG: 125 TABLET, DELAYED RELEASE ORAL at 21:31

## 2025-01-08 RX ADMIN — INSULIN LISPRO 2 UNITS: 100 INJECTION, SOLUTION INTRAVENOUS; SUBCUTANEOUS at 08:59

## 2025-01-08 RX ADMIN — DIVALPROEX SODIUM 125 MG: 125 TABLET, DELAYED RELEASE ORAL at 09:02

## 2025-01-08 RX ADMIN — DEXTROSE MONOHYDRATE: 50 INJECTION, SOLUTION INTRAVENOUS at 08:58

## 2025-01-08 RX ADMIN — INSULIN GLARGINE 14 UNITS: 100 INJECTION, SOLUTION SUBCUTANEOUS at 21:31

## 2025-01-08 RX ADMIN — MICONAZOLE NITRATE: 20 CREAM TOPICAL at 09:09

## 2025-01-08 RX ADMIN — INSULIN LISPRO 2 UNITS: 100 INJECTION, SOLUTION INTRAVENOUS; SUBCUTANEOUS at 16:44

## 2025-01-08 RX ADMIN — DIVALPROEX SODIUM 125 MG: 125 TABLET, DELAYED RELEASE ORAL at 14:06

## 2025-01-08 RX ADMIN — MIRTAZAPINE 7.5 MG: 15 TABLET, FILM COATED ORAL at 21:31

## 2025-01-08 RX ADMIN — POTASSIUM BICARBONATE 40 MEQ: 782 TABLET, EFFERVESCENT ORAL at 21:31

## 2025-01-08 RX ADMIN — INSULIN LISPRO 2 UNITS: 100 INJECTION, SOLUTION INTRAVENOUS; SUBCUTANEOUS at 21:32

## 2025-01-08 RX ADMIN — IPRATROPIUM BROMIDE AND ALBUTEROL SULFATE 1 DOSE: 2.5; .5 SOLUTION RESPIRATORY (INHALATION) at 17:41

## 2025-01-08 RX ADMIN — CETIRIZINE HYDROCHLORIDE 10 MG: 10 TABLET, FILM COATED ORAL at 21:31

## 2025-01-08 RX ADMIN — MEMANTINE 10 MG: 5 TABLET ORAL at 21:31

## 2025-01-08 RX ADMIN — MICONAZOLE NITRATE: 20 CREAM TOPICAL at 21:32

## 2025-01-08 RX ADMIN — WATER 1000 MG: 1 INJECTION INTRAMUSCULAR; INTRAVENOUS; SUBCUTANEOUS at 10:12

## 2025-01-08 RX ADMIN — POTASSIUM BICARBONATE 40 MEQ: 782 TABLET, EFFERVESCENT ORAL at 12:16

## 2025-01-08 RX ADMIN — INSULIN LISPRO 2 UNITS: 100 INJECTION, SOLUTION INTRAVENOUS; SUBCUTANEOUS at 12:16

## 2025-01-08 RX ADMIN — MEMANTINE 10 MG: 5 TABLET ORAL at 09:02

## 2025-01-08 RX ADMIN — PHENOL 1 SPRAY: 1.5 LIQUID ORAL at 09:09

## 2025-01-08 ASSESSMENT — PAIN SCALES - GENERAL
PAINLEVEL_OUTOF10: 0
PAINLEVEL_OUTOF10: 2

## 2025-01-08 ASSESSMENT — PAIN DESCRIPTION - DESCRIPTORS: DESCRIPTORS: ACHING

## 2025-01-08 ASSESSMENT — PAIN DESCRIPTION - LOCATION: LOCATION: THROAT

## 2025-01-08 NOTE — PLAN OF CARE
Problem: Safety - Adult  Goal: Free from fall injury  1/8/2025 0947 by Lexy Mtz RN  Outcome: Progressing  Flowsheets (Taken 1/8/2025 0721)  Free From Fall Injury: Instruct family/caregiver on patient safety  1/8/2025 0029 by Staci Mkceon RN  Outcome: Progressing     Problem: Chronic Conditions and Co-morbidities  Goal: Patient's chronic conditions and co-morbidity symptoms are monitored and maintained or improved  1/8/2025 0947 by Lexy Mtz RN  Outcome: Progressing  Flowsheets (Taken 1/8/2025 0721)  Care Plan - Patient's Chronic Conditions and Co-Morbidity Symptoms are Monitored and Maintained or Improved: Monitor and assess patient's chronic conditions and comorbid symptoms for stability, deterioration, or improvement  1/8/2025 0029 by Staci Mckeon RN  Outcome: Progressing     Problem: Discharge Planning  Goal: Discharge to home or other facility with appropriate resources  1/8/2025 0947 by Lexy Mtz RN  Outcome: Progressing  Flowsheets (Taken 1/8/2025 0721)  Discharge to home or other facility with appropriate resources: Identify barriers to discharge with patient and caregiver  1/8/2025 0029 by Staci Mckeon RN  Outcome: Progressing     Problem: Skin/Tissue Integrity  Goal: Absence of new skin breakdown  Description: 1.  Monitor for areas of redness and/or skin breakdown  2.  Assess vascular access sites hourly  3.  Every 4-6 hours minimum:  Change oxygen saturation probe site  4.  Every 4-6 hours:  If on nasal continuous positive airway pressure, respiratory therapy assess nares and determine need for appliance change or resting period.  1/8/2025 0947 by Lexy Mtz RN  Outcome: Progressing  1/8/2025 0029 by Staci Mckeon RN  Outcome: Progressing     Problem: ABCDS Injury Assessment  Goal: Absence of physical injury  1/8/2025 0947 by Lexy Mtz RN  Outcome: Progressing  Flowsheets

## 2025-01-08 NOTE — CARE COORDINATION
CM reviewed clinical notes then met with patient and her daughter. Diagnosed UTI. Room air.Nephrology consulting. Patient oriented only to self. Daughter/HCPOA, Natalya, at bedside. Natalya reports patient is improving. Dementia history. Dual coverage.  Reports patient is a resident at [facility] for the last 1 1/2 yrs. Prior to current illness, patient was up in wheelchair out in the facility. Assistance with transfer to  when ambulating.Natalya reports patient is not receiving therapy in the facility.   SLP recommending pureed diet.     Natalya reports transition of care plan to return to [facility] bed.  [facility] has accepted return of patient at discharge.      0

## 2025-01-09 LAB
ANION GAP SERPL CALC-SCNC: 12 MMOL/L (ref 7–16)
BASOPHILS # BLD: 0.02 K/UL (ref 0–0.2)
BASOPHILS NFR BLD: 0.2 % (ref 0–2)
BUN SERPL-MCNC: 12 MG/DL (ref 8–23)
CALCIUM SERPL-MCNC: 7.5 MG/DL (ref 8.8–10.2)
CHLORIDE SERPL-SCNC: 107 MMOL/L (ref 98–107)
CO2 SERPL-SCNC: 26 MMOL/L (ref 20–29)
CREAT SERPL-MCNC: 0.85 MG/DL (ref 0.6–1.1)
DIFFERENTIAL METHOD BLD: ABNORMAL
EOSINOPHIL # BLD: 0.33 K/UL (ref 0–0.8)
EOSINOPHIL NFR BLD: 3.6 % (ref 0.5–7.8)
ERYTHROCYTE [DISTWIDTH] IN BLOOD BY AUTOMATED COUNT: 12.6 % (ref 11.9–14.6)
GLUCOSE BLD STRIP.AUTO-MCNC: 128 MG/DL (ref 65–100)
GLUCOSE BLD STRIP.AUTO-MCNC: 158 MG/DL (ref 65–100)
GLUCOSE BLD STRIP.AUTO-MCNC: 173 MG/DL (ref 65–100)
GLUCOSE BLD STRIP.AUTO-MCNC: 181 MG/DL (ref 65–100)
GLUCOSE BLD STRIP.AUTO-MCNC: 73 MG/DL (ref 65–100)
GLUCOSE SERPL-MCNC: 191 MG/DL (ref 70–99)
HCT VFR BLD AUTO: 32.2 % (ref 35.8–46.3)
HGB BLD-MCNC: 10.8 G/DL (ref 11.7–15.4)
IMM GRANULOCYTES # BLD AUTO: 0.07 K/UL (ref 0–0.5)
IMM GRANULOCYTES NFR BLD AUTO: 0.8 % (ref 0–5)
LYMPHOCYTES # BLD: 1.59 K/UL (ref 0.5–4.6)
LYMPHOCYTES NFR BLD: 17.1 % (ref 13–44)
MCH RBC QN AUTO: 32 PG (ref 26.1–32.9)
MCHC RBC AUTO-ENTMCNC: 33.5 G/DL (ref 31.4–35)
MCV RBC AUTO: 95.5 FL (ref 82–102)
MONOCYTES # BLD: 0.57 K/UL (ref 0.1–1.3)
MONOCYTES NFR BLD: 6.1 % (ref 4–12)
NEUTS SEG # BLD: 6.7 K/UL (ref 1.7–8.2)
NEUTS SEG NFR BLD: 72.2 % (ref 43–78)
NRBC # BLD: 0 K/UL (ref 0–0.2)
PLATELET # BLD AUTO: 217 K/UL (ref 150–450)
PMV BLD AUTO: 11.4 FL (ref 9.4–12.3)
POTASSIUM SERPL-SCNC: 3.6 MMOL/L (ref 3.5–5.1)
RBC # BLD AUTO: 3.37 M/UL (ref 4.05–5.2)
SERVICE CMNT-IMP: ABNORMAL
SERVICE CMNT-IMP: NORMAL
SODIUM SERPL-SCNC: 145 MMOL/L (ref 136–145)
WBC # BLD AUTO: 9.3 K/UL (ref 4.3–11.1)

## 2025-01-09 PROCEDURE — 2500000003 HC RX 250 WO HCPCS: Performed by: FAMILY MEDICINE

## 2025-01-09 PROCEDURE — 6370000000 HC RX 637 (ALT 250 FOR IP): Performed by: NURSE PRACTITIONER

## 2025-01-09 PROCEDURE — 2580000003 HC RX 258: Performed by: INTERNAL MEDICINE

## 2025-01-09 PROCEDURE — 97550 CAREGIVER TRAING 1ST 30 MIN: CPT

## 2025-01-09 PROCEDURE — 6370000000 HC RX 637 (ALT 250 FOR IP): Performed by: HOSPITALIST

## 2025-01-09 PROCEDURE — 36415 COLL VENOUS BLD VENIPUNCTURE: CPT

## 2025-01-09 PROCEDURE — 80048 BASIC METABOLIC PNL TOTAL CA: CPT

## 2025-01-09 PROCEDURE — 6370000000 HC RX 637 (ALT 250 FOR IP): Performed by: FAMILY MEDICINE

## 2025-01-09 PROCEDURE — 85025 COMPLETE CBC W/AUTO DIFF WBC: CPT

## 2025-01-09 PROCEDURE — 6360000002 HC RX W HCPCS: Performed by: HOSPITALIST

## 2025-01-09 PROCEDURE — 1100000000 HC RM PRIVATE

## 2025-01-09 PROCEDURE — 82962 GLUCOSE BLOOD TEST: CPT

## 2025-01-09 RX ORDER — CEFDINIR 300 MG/1
300 CAPSULE ORAL EVERY 12 HOURS SCHEDULED
Status: DISCONTINUED | OUTPATIENT
Start: 2025-01-09 | End: 2025-01-10 | Stop reason: HOSPADM

## 2025-01-09 RX ORDER — SODIUM CHLORIDE AND POTASSIUM CHLORIDE 150; 900 MG/100ML; MG/100ML
INJECTION, SOLUTION INTRAVENOUS CONTINUOUS
Status: DISCONTINUED | OUTPATIENT
Start: 2025-01-09 | End: 2025-01-10 | Stop reason: HOSPADM

## 2025-01-09 RX ORDER — INSULIN LISPRO 100 [IU]/ML
4 INJECTION, SOLUTION INTRAVENOUS; SUBCUTANEOUS
Status: DISCONTINUED | OUTPATIENT
Start: 2025-01-09 | End: 2025-01-10 | Stop reason: HOSPADM

## 2025-01-09 RX ORDER — INSULIN GLARGINE 100 [IU]/ML
6 INJECTION, SOLUTION SUBCUTANEOUS NIGHTLY
Status: DISCONTINUED | OUTPATIENT
Start: 2025-01-09 | End: 2025-01-10 | Stop reason: HOSPADM

## 2025-01-09 RX ORDER — INSULIN LISPRO 100 [IU]/ML
5 INJECTION, SOLUTION INTRAVENOUS; SUBCUTANEOUS
Status: DISCONTINUED | OUTPATIENT
Start: 2025-01-09 | End: 2025-01-09

## 2025-01-09 RX ORDER — INSULIN GLARGINE 100 [IU]/ML
12 INJECTION, SOLUTION SUBCUTANEOUS NIGHTLY
Status: DISCONTINUED | OUTPATIENT
Start: 2025-01-09 | End: 2025-01-09

## 2025-01-09 RX ORDER — INSULIN GLARGINE 100 [IU]/ML
15 INJECTION, SOLUTION SUBCUTANEOUS NIGHTLY
Status: DISCONTINUED | OUTPATIENT
Start: 2025-01-09 | End: 2025-01-09

## 2025-01-09 RX ADMIN — MEMANTINE 10 MG: 5 TABLET ORAL at 20:51

## 2025-01-09 RX ADMIN — SODIUM CHLORIDE, PRESERVATIVE FREE 10 ML: 5 INJECTION INTRAVENOUS at 20:51

## 2025-01-09 RX ADMIN — DIVALPROEX SODIUM 125 MG: 125 TABLET, DELAYED RELEASE ORAL at 15:46

## 2025-01-09 RX ADMIN — DEXTROSE MONOHYDRATE: 50 INJECTION, SOLUTION INTRAVENOUS at 05:44

## 2025-01-09 RX ADMIN — INSULIN LISPRO 4 UNITS: 100 INJECTION, SOLUTION INTRAVENOUS; SUBCUTANEOUS at 18:23

## 2025-01-09 RX ADMIN — MEMANTINE 10 MG: 5 TABLET ORAL at 10:24

## 2025-01-09 RX ADMIN — DIVALPROEX SODIUM 125 MG: 125 TABLET, DELAYED RELEASE ORAL at 20:51

## 2025-01-09 RX ADMIN — CEFDINIR 300 MG: 300 CAPSULE ORAL at 20:51

## 2025-01-09 RX ADMIN — SODIUM CHLORIDE, PRESERVATIVE FREE 10 ML: 5 INJECTION INTRAVENOUS at 10:25

## 2025-01-09 RX ADMIN — CEFDINIR 300 MG: 300 CAPSULE ORAL at 10:24

## 2025-01-09 RX ADMIN — MICONAZOLE NITRATE: 20 CREAM TOPICAL at 20:52

## 2025-01-09 RX ADMIN — DIVALPROEX SODIUM 125 MG: 125 TABLET, DELAYED RELEASE ORAL at 10:24

## 2025-01-09 RX ADMIN — INSULIN GLARGINE 6 UNITS: 100 INJECTION, SOLUTION SUBCUTANEOUS at 21:50

## 2025-01-09 RX ADMIN — POTASSIUM CHLORIDE AND SODIUM CHLORIDE: 900; 150 INJECTION, SOLUTION INTRAVENOUS at 15:48

## 2025-01-09 RX ADMIN — INSULIN LISPRO 5 UNITS: 100 INJECTION, SOLUTION INTRAVENOUS; SUBCUTANEOUS at 12:58

## 2025-01-09 RX ADMIN — CETIRIZINE HYDROCHLORIDE 10 MG: 10 TABLET, FILM COATED ORAL at 20:51

## 2025-01-09 RX ADMIN — MICONAZOLE NITRATE: 20 CREAM TOPICAL at 10:25

## 2025-01-10 VITALS
WEIGHT: 150.3 LBS | HEART RATE: 90 BPM | TEMPERATURE: 98.1 F | DIASTOLIC BLOOD PRESSURE: 50 MMHG | HEIGHT: 64 IN | SYSTOLIC BLOOD PRESSURE: 149 MMHG | BODY MASS INDEX: 25.66 KG/M2 | OXYGEN SATURATION: 92 % | RESPIRATION RATE: 18 BRPM

## 2025-01-10 LAB
GLUCOSE BLD STRIP.AUTO-MCNC: 115 MG/DL (ref 65–100)
GLUCOSE BLD STRIP.AUTO-MCNC: 91 MG/DL (ref 65–100)
SERVICE CMNT-IMP: ABNORMAL
SERVICE CMNT-IMP: NORMAL

## 2025-01-10 PROCEDURE — 2500000003 HC RX 250 WO HCPCS: Performed by: FAMILY MEDICINE

## 2025-01-10 PROCEDURE — 82962 GLUCOSE BLOOD TEST: CPT

## 2025-01-10 PROCEDURE — 6370000000 HC RX 637 (ALT 250 FOR IP): Performed by: HOSPITALIST

## 2025-01-10 PROCEDURE — 6370000000 HC RX 637 (ALT 250 FOR IP): Performed by: FAMILY MEDICINE

## 2025-01-10 PROCEDURE — 92526 ORAL FUNCTION THERAPY: CPT

## 2025-01-10 RX ORDER — CEFDINIR 300 MG/1
300 CAPSULE ORAL DAILY
Qty: 4 CAPSULE | Refills: 0 | Status: SHIPPED | OUTPATIENT
Start: 2025-01-10 | End: 2025-01-14

## 2025-01-10 RX ORDER — SENNA AND DOCUSATE SODIUM 50; 8.6 MG/1; MG/1
2 TABLET, FILM COATED ORAL 2 TIMES DAILY PRN
Qty: 10 TABLET | Refills: 0
Start: 2025-01-10

## 2025-01-10 RX ADMIN — DIVALPROEX SODIUM 125 MG: 125 TABLET, DELAYED RELEASE ORAL at 09:36

## 2025-01-10 RX ADMIN — INSULIN LISPRO 4 UNITS: 100 INJECTION, SOLUTION INTRAVENOUS; SUBCUTANEOUS at 09:36

## 2025-01-10 RX ADMIN — CEFDINIR 300 MG: 300 CAPSULE ORAL at 09:36

## 2025-01-10 RX ADMIN — MICONAZOLE NITRATE: 20 CREAM TOPICAL at 09:36

## 2025-01-10 RX ADMIN — MEMANTINE 10 MG: 5 TABLET ORAL at 09:36

## 2025-01-10 RX ADMIN — SODIUM CHLORIDE, PRESERVATIVE FREE 10 ML: 5 INJECTION INTRAVENOUS at 09:36

## 2025-01-10 ASSESSMENT — PAIN SCALES - PAIN ASSESSMENT IN ADVANCED DEMENTIA (PAINAD)
CONSOLABILITY: NO NEED TO CONSOLE
BREATHING: NORMAL
BODYLANGUAGE: RELAXED
TOTALSCORE: 0
FACIALEXPRESSION: SMILING OR INEXPRESSIVE

## 2025-01-10 ASSESSMENT — PAIN SCALES - GENERAL: PAINLEVEL_OUTOF10: 0

## 2025-01-10 NOTE — PROGRESS NOTES
Maryuri Calderón  Admission Date: 1/2/2025         [facility] Progress Note: 1/9/2025    Follow-up for: Hypernatremia     The patient's chart is reviewed and the patient is discussed with the staff.    Subjective:   Pt seen and examined in room daughter at the bedside, pt drowsy, responds to some questions, + weakness, poor PO intake.     ROS:  No CP, No SOB, no N/V    Current Facility-Administered Medications   Medication Dose Route Frequency    cefdinir (OMNICEF) capsule 300 mg  300 mg Oral 2 times per day    insulin glargine (LANTUS) injection vial 15 Units  15 Units SubCUTAneous Nightly    insulin lispro (HUMALOG,ADMELOG) injection vial 5 Units  5 Units SubCUTAneous TID WC    ipratropium 0.5 mg-albuterol 2.5 mg (DUONEB) nebulizer solution 1 Dose  1 Dose Inhalation Q4H PRN    loperamide (IMODIUM) capsule 2 mg  2 mg Oral 4x Daily PRN    phenol 1.4 % mouth spray 1 spray  1 spray Mouth/Throat Q2H PRN    miconazole (MICOTIN) 2 % cream   Topical BID    sodium chloride flush 0.9 % injection 5-40 mL  5-40 mL IntraVENous 2 times per day    sodium chloride flush 0.9 % injection 5-40 mL  5-40 mL IntraVENous PRN    0.9 % sodium chloride infusion   IntraVENous PRN    ondansetron (ZOFRAN-ODT) disintegrating tablet 4 mg  4 mg Oral Q6H PRN    Or    ondansetron (ZOFRAN) injection 4 mg  4 mg IntraVENous Q6H PRN    polyethylene glycol (GLYCOLAX) packet 17 g  17 g Oral Daily PRN    acetaminophen (TYLENOL) tablet 650 mg  650 mg Oral Q6H PRN    Or    acetaminophen (TYLENOL) suppository 650 mg  650 mg Rectal Q6H PRN    glucose chewable tablet 16 g  4 tablet Oral PRN    dextrose bolus 10% 125 mL  125 mL IntraVENous PRN    Or    dextrose bolus 10% 250 mL  250 mL IntraVENous PRN    Glucagon Emergency KIT 1 mg  1 mg SubCUTAneous PRN    dextrose 10 % infusion   IntraVENous Continuous PRN    insulin lispro (HUMALOG,ADMELOG) injection vial 0-8 Units  0-8 Units SubCUTAneous 4x Daily AC & HS    cetirizine (ZYRTEC) 
                     Maryuri Calderón  Admission Date: 1/2/2025         [facility] Progress Note: 1/7/2025    Follow-up for: Hypernatremia     The patient's chart is reviewed and the patient is discussed with the staff.    Subjective:   Pt seen and examined in room daughter at the bedside, pt is up in bed more awake and interactive today, denies pain, poor appetite.     ROS:  No CP, No SOB, no N/V    Current Facility-Administered Medications   Medication Dose Route Frequency    potassium chloride 10 mEq/100 mL IVPB (Peripheral Line)  10 mEq IntraVENous Q2H    loperamide (IMODIUM) capsule 2 mg  2 mg Oral 4x Daily PRN    insulin glargine (LANTUS) injection vial 10 Units  10 Units SubCUTAneous Nightly    cefTRIAXone (ROCEPHIN) 1,000 mg in sterile water 10 mL IV syringe  1,000 mg IntraVENous Q24H    dextrose 5 % solution   IntraVENous Continuous    phenol 1.4 % mouth spray 1 spray  1 spray Mouth/Throat Q2H PRN    miconazole (MICOTIN) 2 % cream   Topical BID    sodium chloride flush 0.9 % injection 5-40 mL  5-40 mL IntraVENous 2 times per day    sodium chloride flush 0.9 % injection 5-40 mL  5-40 mL IntraVENous PRN    0.9 % sodium chloride infusion   IntraVENous PRN    ondansetron (ZOFRAN-ODT) disintegrating tablet 4 mg  4 mg Oral Q6H PRN    Or    ondansetron (ZOFRAN) injection 4 mg  4 mg IntraVENous Q6H PRN    polyethylene glycol (GLYCOLAX) packet 17 g  17 g Oral Daily PRN    acetaminophen (TYLENOL) tablet 650 mg  650 mg Oral Q6H PRN    Or    acetaminophen (TYLENOL) suppository 650 mg  650 mg Rectal Q6H PRN    glucose chewable tablet 16 g  4 tablet Oral PRN    dextrose bolus 10% 125 mL  125 mL IntraVENous PRN    Or    dextrose bolus 10% 250 mL  250 mL IntraVENous PRN    Glucagon Emergency KIT 1 mg  1 mg SubCUTAneous PRN    dextrose 10 % infusion   IntraVENous Continuous PRN    insulin lispro (HUMALOG,ADMELOG) injection vial 0-8 Units  0-8 Units SubCUTAneous 4x Daily AC & HS    cetirizine (ZYRTEC) tablet 10 mg  10 
                     Maryuri Calderón  Admission Date: 1/2/2025         [facility] Progress Note: 1/8/2025    Follow-up for: Hypernatremia     The patient's chart is reviewed and the patient is discussed with the staff.    Subjective:   Pt seen and examined in room daughter at the bedside, pt awake, taking in some PO food and liquids- not much. Denies pain.     ROS:  No CP, No SOB, no N/V    Current Facility-Administered Medications   Medication Dose Route Frequency    potassium bicarb-citric acid (EFFER-K) effervescent tablet 40 mEq  40 mEq Oral BID    insulin glargine (LANTUS) injection vial 14 Units  14 Units SubCUTAneous Nightly    [START ON 1/9/2025] cefTRIAXone (ROCEPHIN) 1,000 mg in sterile water 10 mL IV syringe  1,000 mg IntraVENous Q24H    ipratropium 0.5 mg-albuterol 2.5 mg (DUONEB) nebulizer solution 1 Dose  1 Dose Inhalation Q4H PRN    loperamide (IMODIUM) capsule 2 mg  2 mg Oral 4x Daily PRN    dextrose 5 % solution   IntraVENous Continuous    phenol 1.4 % mouth spray 1 spray  1 spray Mouth/Throat Q2H PRN    miconazole (MICOTIN) 2 % cream   Topical BID    sodium chloride flush 0.9 % injection 5-40 mL  5-40 mL IntraVENous 2 times per day    sodium chloride flush 0.9 % injection 5-40 mL  5-40 mL IntraVENous PRN    0.9 % sodium chloride infusion   IntraVENous PRN    ondansetron (ZOFRAN-ODT) disintegrating tablet 4 mg  4 mg Oral Q6H PRN    Or    ondansetron (ZOFRAN) injection 4 mg  4 mg IntraVENous Q6H PRN    polyethylene glycol (GLYCOLAX) packet 17 g  17 g Oral Daily PRN    acetaminophen (TYLENOL) tablet 650 mg  650 mg Oral Q6H PRN    Or    acetaminophen (TYLENOL) suppository 650 mg  650 mg Rectal Q6H PRN    glucose chewable tablet 16 g  4 tablet Oral PRN    dextrose bolus 10% 125 mL  125 mL IntraVENous PRN    Or    dextrose bolus 10% 250 mL  250 mL IntraVENous PRN    Glucagon Emergency KIT 1 mg  1 mg SubCUTAneous PRN    dextrose 10 % infusion   IntraVENous Continuous PRN    insulin lispro 
       Hospitalist Progress Note   Admit Date:  2025  1:25 PM   Name:  Maryuri Calderón   Age:  89 y.o.  Sex:  female  :  1935   MRN:  [id number]   Room:  [place] [address]     89 y.o. female with medical history significant for dementia, type 2 diabetes who is currently living at a memory care facility.  She presented on 2025 with altered mental status and hypoxia requiring 15 L of high flow nasal cannula.  Workup revealed urinary tract infection.  Patient met criteria for sepsis on admission with fever and tachypnea.  Patient was started on IV antibiotics.  Urine culture with pansensitive E. coli.  During hospitalization, patient was noted to be in SAWYER and hypernatremia.      Today, no ac events, poor appetite, daughter at bedside      Assessment & Plan:     Sepsis, severe, due to E. coli acute recurrent UTI  Hx of urinary incontinence  Cephalosporin. Improving    SAWYER, prerenal, resolved w/ IVF    Hypernatremia, hypovolemic, resolved    Acute metabolic encephalopathy due to hyponatremia and UTI  Hx of Dementia  -Continue with Prasanna Dillon, not well controlled  Insulin advanced    Hx of Bilateral Deafness: Uses hearing aids.    Hyperkalemia: Resolved      Dispo; back to nursing home if able to hydrate herself and has better PO intake, possibly tomorrow      Objective:   Patient Vitals for the past 24 hrs:   Temp Pulse Resp BP SpO2   25 0726 98.4 °F (36.9 °C) 89 20 (!) 145/56 98 %   25 0150 97.9 °F (36.6 °C) (!) 106 17 126/82 93 %   25 1937 99 °F (37.2 °C) 89 16 118/79 92 %   25 1741 -- 82 16 -- 98 %   25 1549 99.5 °F (37.5 °C) 88 17 128/85 100 %       Oxygen Therapy  SpO2: 98 %  Pulse via Oximetry: 92 beats per minute  Pulse Oximeter Device Mode: Intermittent  O2 Device: None (Room air)  FiO2 : 40 %  O2 Flow Rate (L/min): 2 L/min  Blood Gas  Performed?: Yes  Benedict's Test #1: Collateral flow confirmed  Site #1: Right Radial  Site Prepped #1: Yes  Number of Attempts #1: 
       Hospitalist Progress Note   Admit Date:  2025  1:25 PM   Name:  Maryuri Calderón   Age:  89 y.o.  Sex:  female  :  1935   MRN:  [id number]   Room:  [place] [address]    Presenting/Chief Complaint: Shortness of Breath and Altered Mental Status     Reason(s) for Admission: Hyperglycemia [R73.9]  SAWYER (acute kidney injury) (HCC) [N17.9]  Urinary tract infection in female [N39.0]     Hospital Course:   Maryuri Calderón is a 89 y.o. female with medical history of dementia, type 2 diabetes, and a memory care facility resident, who presented with AMS.  Also had hypoxia initially requiring 15 L of HFNC.  Currently treating for UTI.      Subjective & 24hr Events:   Had a fever 100.6 °F    AxO x1.  Very difficult to hear.  No particular issues.  Per daughter, she is very close to baseline      Assessment & Plan:       Acute hypoxic respiratory failure -resolved  -In the setting of sepsis  -Initially required 15 L of HFNC  -Treat the underlying cause  -Continue supplemental oxygen to keep O2 >90%      Severe sepsis  -Met SIRS criteria with fever and tachypnea  -In the setting of UTI  -Having SAWYER and encephalopathy  -Treat the underlying cause  -Follow up with blood culture results      Acute metabolic encephalopathy  -In the setting of UTI  -Treat the underlying cause  -Delirium precautions      SAWYER  Hypernatremia  -Creatinine 2.59 on admission.  Baseline 0.6.  -In the setting of decreased p.o. intake and UTI  -S/p IV fluids  -She refuses to drink water.  Continue IV fluids.  Will order 1/2 NS  -Daily CMP/BMP      E. coli acute cystitis  -UA showed large leukocyte esterase, large blood, negative nitrite  -Presenting with AMS  -Urine culture showed E. coli sensitive to ceftriaxone  -Continue ceftriaxone      Hyperglycemia  -In the setting of D5  -Stopped D5  -Continue to monitor with daily CMP/BMP      Type 2 diabetes  -A1c 8.4% this admission  -Continue sliding scale      Hyperkalemia -resolved  -In the setting of SAWYER  -S/p 
       Hospitalist Progress Note   Admit Date:  2025  1:25 PM   Name:  Maryuri Calderón   Age:  89 y.o.  Sex:  female  :  1935   MRN:  [id number]   Room:  [place] [address]    Presenting/Chief Complaint: Shortness of Breath and Altered Mental Status     Reason(s) for Admission: Hyperglycemia [R73.9]  SAWYER (acute kidney injury) (HCC) [N17.9]  Urinary tract infection in female [N39.0]     Hospital Course:   Maryuri Calderón is a 89 y.o. female with medical history of dementia, type 2 diabetes, and a memory care facility resident, who presented with AMS.  Also had hypoxia initially requiring 15 L of HFNC.  Currently treating for UTI.      Subjective & 24hr Events:   No acute overnight event.    AxO x1.  Very difficult to hear.  No particular issues.  Per daughter, she is much improved.      Assessment & Plan:       Acute hypoxic respiratory failure -improving  -In the setting of sepsis  -Initially required 15 L of HFNC  -Treat the underlying cause  -Continue supplemental oxygen to keep O2 >90%      Severe sepsis  -Met SIRS criteria with fever and tachypnea  -In the setting of UTI  -Having SAWYER and encephalopathy  -Treat the underlying cause  -Follow up with blood culture results      Acute metabolic encephalopathy  -In the setting of UTI  -Treat the underlying cause  -Delirium precautions      SAWYER  Hypernatremia  -Creatinine 2.59 on admission.  Baseline 0.6.  -In the setting of decreased p.o. intake and UTI  -S/p IV fluids  -Continue IV fluids  -Daily CMP/BMP      UTI  -UA showed large leukocyte esterase, large blood, negative nitrite  -Presenting with AMS  -Continue ceftriaxone  -Awaiting urine culture results      Hyperglycemia  -In the setting of D5  -Will stop D5.  Will initiate LR      Type 2 diabetes  -A1c 8.4% this admission  -Continue sliding scale      Hyperkalemia -resolved  -In the setting of SAWYER  -S/p IV fluid  -Continue to monitor with daily CMP/BMP      Essential hypertension  -Currently holding home medicines 
       Hospitalist Progress Note   Admit Date:  2025  1:25 PM   Name:  Maryuri Calderón   Age:  89 y.o.  Sex:  female  :  1935   MRN:  [id number]   Room:  [place] [address]    Presenting/Chief Complaint: Shortness of Breath and Altered Mental Status     Reason(s) for Admission: Hyperglycemia [R73.9]  SAWYER (acute kidney injury) (HCC) [N17.9]  Urinary tract infection in female [N39.0]     Hospital Course:   Please refer to the admission H&P for details of presentation.      In summary, Maryuri Calderón is a 89 y.o. female with medical history significant for dementia, type 2 diabetes who is currently living at a memory care facility.  She presented on 2025 with altered mental status and hypoxia requiring 15 L of high flow nasal cannula.  Workup revealed urinary tract infection.  Patient met criteria for sepsis on admission with fever and tachypnea.  Patient was started on IV antibiotics.  Urine culture with pansensitive E. coli.  During hospitalization, patient was noted to be in SAWYER and hypernatremia.      Subjective/24 hr Events (25) :  Patient is seen and examined at bedside.    Had febrile episode with temp of 102F yesterday evening.   Patient laying in bed.   More sleepy today. Reports she feels the same as yesterday.    Went back in the afternoon while daughter is bedside.  She remained sleepy but arousable.     Assessment & Plan:   Sepsis, severe, due to E. coli cystitis  Met criteria for sepsis on admission.  Urine culture with pansensitive E. coli  -continue with rocephin given febrile episode yesterday.     SAWYER  Creatinine of 2.59 on admission.  Baseline around 0.6.  Renal function is still not back at baseline. Cr of 0.96 today.  -C/w IV fluid.      Hypernatremia  Sodium of 162 on admission.  Peaked to 165 on 2025.  Sodium still elevated at 148 today.  -Continue with D5 IV fluids. Likely can be dc tomorrow  -Monitor sodium closely.    Acute metabolic encephalopathy due to hyponatremia and 
       Hospitalist Progress Note   Admit Date:  2025  1:25 PM   Name:  Maryuri Calderón   Age:  89 y.o.  Sex:  female  :  1935   MRN:  [id number]   Room:  [place] [address]    Presenting/Chief Complaint: Shortness of Breath and Altered Mental Status     Reason(s) for Admission: Hyperglycemia [R73.9]  SAWYER (acute kidney injury) (HCC) [N17.9]  Urinary tract infection in female [N39.0]     Hospital Course:   Please refer to the admission H&P for details of presentation.      In summary, Maryuri Calderón is a 89 y.o. female with medical history significant for dementia, type 2 diabetes who is currently living at a memory care facility.  She presented on 2025 with altered mental status and hypoxia requiring 15 L of high flow nasal cannula.  Workup revealed urinary tract infection.  Patient met criteria for sepsis on admission with fever and tachypnea.  Patient was started on IV antibiotics.  Urine culture with pansensitive E. coli.  During hospitalization, patient was noted to be in SAWYER and hypernatremia.      Subjective/24 hr Events (25) :  Patient is seen and examined at bedside.    Patient laying in bed.  Daughter is at bedside in the afternoon.  Patient has been upgraded to puréed diet per SLP evaluation.  Is alert and awake but confused which is her baseline.    Assessment & Plan:   Sepsis, severe, due to E. coli cystitis  Met criteria for sepsis on admission.  Urine culture with pansensitive E. coli  -Completing a course of Rocephin today.    SAWYER  Creatinine of 2.59 on admission.  Baseline around 0.6.  -Renal function improving but not back to baseline.  Creatinine of 1.11 today.  -Will decrease IV fluid as patient is now starting p.o. diet.  Encourage p.o. intake.      Hypernatremia  Sodium of 162 on admission.  Peaked to 165 on 2025.  Sodium still elevated at 154 today.  -Continue with D5 IV fluids but decrease rate to 50 given that patient sodium came down by more than 8 points in last 24 
  SPEECH LANGUAGE PATHOLOGY: ATTEMPT     NAME: Maryuri Calderón  : 1935  MRN: [id number]    ADMISSION DATE: 2025  PRIMARY DIAGNOSIS: SAWYER (acute kidney injury) (HCC)    Speech Therapy attempted. Patient is inappropriate for oral intake at this time. RN has held medications this morning due to somnolence and patient moaning when attempting to reposition for oral trials. Will hold speech therapy at this time. Recommending GOC discussion, as patient is unlikely to meet nutrition needs by mouth in current state.      Sarah Wing M.S., CCC-SLP   2025 11:03 AM    
ABG results @ 1036=7.38, 48,, 60, (HC03) 28, 90%,,done on 1 lpm n/c  
GOALS:  LTG: Patient will maintain adequate hydration/nutrition with optimum safety and efficiency of swallowing function with PO intake without overt signs or symptoms of aspiration for the highest appropriate diet level.  STG: PROGRESSING 25  Patient will consume pureed textures and thin liquids without overt signs or symptoms of airway compromise.  Patient will complete a Modified Barium Swallow study to fully assess physiology and anatomy of the swallow and determine safest appropriate diet and/or rehabilitation strategies, as medically indicated and pending family goals of care. MET 25    SPEECH LANGUAGE PATHOLOGY: Caregiver Training Daily Note #1    Acknowledge Order  I  Therapy Time  I   Charges     I  Rehab Caseload Tracker      NAME: Maryuri Calderón  : 1935  MRN: [id number]    ADMISSION DATE: 2025  PRIMARY DIAGNOSIS: SAWYER (acute kidney injury) ([facility])    ICD-10: Treatment Diagnosis: R13.12 Dysphagia, Oropharyngeal Phase    RECOMMENDATIONS   Diet:    Pureed  Thin Liquids    Medication: crushed in puree or applesauce, as permitted by pharmacy   Compensatory Swallowing Strategies:   Fully awake/alert  Upright for all PO  1:1 assistance with all PO  Slow rate of PO intake  Small bites and sips  Make sure mouth is completely clear before taking additional bites  Liquid wash  Remain upright for 20-30 min after any PO   Therapeutic Intervention:   Patient/family education   Patient continues to require skilled intervention:  Yes. Recommend ongoing speech therapy services during this hospitalization.     Anticipated Discharge Needs: Ongoing speech therapy is recommended at next level of care.      ASSESSMENT    Discussed aspiration precautions with caregiver, reviewed results and recommendations from Modified Barium Swallow Study and persistent penetration with thin liquids. Daughter mentioned interest in Hospice consult, as patient's lab numbers are improved this date, however patient has visually 
GOALS:  LTG: Patient will maintain adequate hydration/nutrition with optimum safety and efficiency of swallowing function with PO intake without overt signs or symptoms of aspiration for the highest appropriate diet level.  STG: PROGRESSING 25  Patient will consume pureed textures and thin liquids without overt signs or symptoms of airway compromise.  Patient will complete a Modified Barium Swallow study to fully assess physiology and anatomy of the swallow and determine safest appropriate diet and/or rehabilitation strategies, as medically indicated and pending family goals of care. MET 25    SPEECH LANGUAGE PATHOLOGY: Modified Barium Swallow Study   Initial Assessment    Acknowledge Order  I  Therapy Time  I   Charges     I  Rehab Caseload Tracker  NAME: Maryuri Calderón  : 1935  MRN: [id number]    ADMISSION DATE: 2025  PRIMARY DIAGNOSIS: SAWYER (acute kidney injury) ([facility])    ICD-10: Treatment Diagnosis: R13.12 Dysphagia, Oropharyngeal Phase    RECOMMENDATIONS   Diet:    Pureed  Thin Liquids    Medication: crushed in puree or applesauce, as permitted by pharmacy   Compensatory Swallowing Strategies:   Fully awake/alert  Upright for all PO  1:1 assistance with all PO  Slow rate of PO intake  Small bites and sips  Make sure mouth is completely clear before taking additional bites  Liquid wash  Remain upright for 20-30 min after any PO   Therapeutic Intervention:   Patient/family education  Instrumental swallow assessment  Dysphagia treatment   Patient continues to require skilled intervention:  Yes. Recommend ongoing speech therapy services during this hospitalization.     Anticipated Discharge Needs: Do not anticipate ongoing speech therapy needs upon discharge.      ASSESSMENT    Patient presents with mild to moderate oropharyngeal dysphagia. Patient is edentulous at time of assessment, though daughter reports that patient has upper dentures only that are consistently worn when at her facility. 
IP Consult to Vascular Access Team  Consult performed by: Puneet Mckeon RN  Consult ordered by: Theresa Ordaz MD       US Guided PIV access-    Ultrasound was used to find the vein which was compressible and without any ultrasound features of an artery or nerve bundle. Skin was cleaned and disinfected prior to IV puncture.  Under real-time ultrasound guidance peripheral access was obtained in the right brachial using 22 G 1.75\" Peripheral IV catheter after 1 attempt(s). Blood return was present and IV flushed without difficulty with no clinical signs of infiltration. IV dressing applied and no immediate complications noted. Patient tolerated the procedure well.       
TRANSFER - OUT REPORT:    Verbal report given to JENSEN Small on Maryuri Calderón  being transferred to [facility], Room 134A for routine progression of patient care       Report consisted of patient's Situation, Background, Assessment and   Recommendations(SBAR).     Information from the following report(s) Nurse Handoff Report was reviewed with the receiving nurse.          Opportunity for questions and clarification was provided.      Patient transported with:  [facility]         
Daughter reports patient coughs with straw yesterday, which persists this date with throat clear on one trial. Therefore patient was administered liquids via cup without difficulty.     Continue current diet of pureed solids and thin liquids.   GENERAL    Subjective: Patient resting in bed, awake and agreeable to breakfast meal.     Reason for Consult: hx of dysphagia    History of Present Injury/Illness: Ms. Calderón  has a past medical history of Broken bones, Diabetes (HCC), Ear problems, Hearing problem, History of mammogram, History of Papanicolaou smear of cervix, Hypertension, and Seasonal allergic rhinitis due to pollen.. She also  has a past surgical history that includes orthopedic surgery (2008); Colonoscopy (2008); Tubal ligation; Cochlear implant (02/21/2017); and sigmoidoscopy (N/A, 3/18/2024).  Precautions/Allergies: Donepezil       OBJECTIVE    Patient administered sips of thin liquids via cup, pureed solids via spoon, consuming about 50% of breakfast meal without overt indications of airway compromise and independent alternating of solids and liquids to facilitate oral clearance.   Patient continues to be most appropriate for pureed solids and thin liquids with medications crushed.     PLAN    Duration/Frequency: Continue to follow patient 1x/week for duration of hospitalization and/or until goals met    Rehabilitation Potential For Stated Goals: Good    Interdisciplinary Collaboration: MD/ PA/ NP  and RN/ PCT    Medical Necessity    Skilled intervention continues to be required due to patient still consuming a modified diet and medical complications.    Education:   Patient educated on Role of speech therapy, Diet recommendations, SLP recommendations, and SLP plan  Education provided to Family/Caregiver and MD  Education response: Verbalizes understanding      Therapy Time:  Time In: 0749  Time Out: 0803  Minutes: 14    Sarah Wing M.S., CCC-SLP   1/10/2025 8:30 AM    
tablet 7.5 mg  7.5 mg Oral Nightly         Objective:     Vitals:    01/05/25 1543 01/05/25 1921 01/06/25 0305 01/06/25 0754   BP: (!) 131/52 (!) 141/54 (!) 143/62 (!) 140/61   Pulse: 93 97 86 94   Resp: (!) 38 20 (!) 32 22   Temp:  98.2 °F (36.8 °C) 98.7 °F (37.1 °C) 98.8 °F (37.1 °C)   TempSrc:  Oral Oral Oral   SpO2: 90% 90% 90% 90%   Weight:       Height:         Intake and Output:   01/04 1901 - 01/06 0700  In: 220 [P.O.:220]  Out: 1475 [Urine:1475]  No intake/output data recorded.    Physical Exam:   Constitutional:  the patient is well developed and in no acute distress, awake, not following commands, frail   HEENT:  oral mucosa dry   Lungs: clear bilaterally   Cardiovascular:  Regular rate, S1, S2, no Rub   Abd/GI: soft and non-tender; with positive bowel sounds.  Ext: warm without cyanosis. There is no lower leg edema.  Skin:  no jaundice or rashes  Neuro: deferred   Psychiatric: Calm.       LAB  Recent Labs     01/04/25  0720 01/05/25  0647 01/06/25  0642   WBC 10.4 11.0 7.9   HGB 11.7 11.8 10.8*   HCT 37.9 38.8 35.5*    202 196     Recent Labs     01/04/25  0720 01/04/25  1134 01/05/25  0647 01/05/25  1327 01/06/25  0642   *   < > 161* 158* 165*   K 3.9  --  3.8  --  3.6   *  --  123*  --  128*   CO2 26  --  27  --  29   BUN 43*  --  32*  --  35*   CREATININE 1.59*  --  1.26*  --  1.24*    < > = values in this interval not displayed.     No results for input(s): \"PH\", \"PCO2\", \"PO2\", \"HCO3\" in the last 72 hours.      Assessment/Plan:  (Medical Decision Making)   1) SAWYER and hypernatremia in the setting of infection, prolonged hyperglycemia and limited free access to water.   Resume D5W with poor PO intake  DW primary RN, reports more lethargic today      2) SAWYER - improving with fluids      3) UTI - on rocephin      4) Dementia and worsened encephalopathy  Recommend palliative care       KONRAD Lam - CNP  [facility]    
  01/05/25 1921 98.2 °F (36.8 °C) 97 20 (!) 141/54 90 %   01/05/25 1543 -- 93 (!) 38 (!) 131/52 90 %       Oxygen Therapy  SpO2: 90 %  Pulse via Oximetry: 92 beats per minute  Pulse Oximeter Device Mode: Intermittent  O2 Device: Nasal cannula  FiO2 : 40 %  O2 Flow Rate (L/min): 2 L/min  Blood Gas  Performed?: Yes  Benedict's Test #1: Collateral flow confirmed  Site #1: Right Radial  Site Prepped #1: Yes  Number of Attempts #1: 1  Pressure Held #1: Yes  Complications #1: None  Post-procedure #1: Standard  Specimen Status #1: Point of care  How Tolerated?: Tolerated well    Estimated body mass index is 25.8 kg/m² as calculated from the following:    Height as of this encounter: 1.626 m (5' 4\").    Weight as of this encounter: 68.2 kg (150 lb 4.8 oz).    Intake/Output Summary (Last 24 hours) at 1/6/2025 1443  Last data filed at 1/6/2025 0754  Gross per 24 hour   Intake 120 ml   Output --   Net 120 ml         Physical Exam:     General:    Well nourished.  NAD.  Head:  Normocephalic, atraumatic.  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  Pupils equally round.  ENT:  Nares appear normal.  Moist oral mucosa  Neck:  No restricted ROM.  Trachea midline   CV:   RRR.  No m/r/g.  No jugular venous distension.  Lungs:   CTAB.  No wheezing, rhonchi, or rales.  Symmetric expansion.  Abdomen:   Soft, nontender, nondistended.  Extremities: No cyanosis or clubbing.  No edema  Skin:     No rashes.  Normal coloration.   Warm and dry.    Neuro:  AxO x1.  No focal neurologic deficits.  Psych:  Normal mood and affect.      I have personally reviewed labs and tests:  Recent Labs:  Recent Results (from the past 48 hour(s))   POCT Glucose    Collection Time: 01/04/25  4:22 PM   Result Value Ref Range    POC Glucose 214 (H) 65 - 100 mg/dL    Performed by: Marika    Sodium    Collection Time: 01/04/25  6:03 PM   Result Value Ref Range    Sodium 159 (H) 136 - 145 mmol/L   POCT Glucose 
her tongue with visual cues to open.  However, pt did close her mouth around the swab when it was in her oral cavity.    Oropharyngeal Assessment: Pt observed with thin liquids, pureed, Ensure and one trial minced/moist eggs from breakfast.  Significantly prolonged mastication and incomplete and ineffective anterior-posterior transfer with minced/moist trial resulting in trial throughout oral cavity-buccal cavities bilaterally, along tongue and majority left within anterior sulcus.  SLP used several swabs to extract residue.  Pt required visual cues for each swab due to being deaf.  Pt with facial grimmacing with all consistencies which occurred inconsistently.  Pt also with throat clears with all consistencies.  Adequate oral clearance with pureed.  Feel pt is at aspiration risk with all po given weakened state and oropharyngeal dysphagia.      Dysphagia Outcome and Severity Scale (VERONICA)  Score: 3 Description   [] 7 Normal in all situations   [] 6 Within Functional Limits/ Modified independent   [] 5 Mild Dysphagia: Distant Supervision. May need one diet consistency      restricted.   [] 4 Mild-Moderate Dysphagia: Intermittent supervision/cuing. One-two diet    consistencies restricted.   [] 3 Moderate Dysphagia: Total assistance, supervision, or strategies.       Two or more diet consistencies restricted.   [] 2 Moderate-Severe Dysphagia: Maximum assistance or maximum use     of strategies with partial po intake   [] 1 Severe dysphagia- NPO. Unable to tolerate any po safely     PLAN    Duration/Frequency: Continue to follow patient 2x/week for duration of hospitalization and/or until goals met    Rehabilitation Potential For Stated Goals: Fair    Interdisciplinary Collaboration: MD/ PA/ NP  and RN/ PCT    Medical Necessity    Skilled intervention continues to be required due to persistent signs and symptoms of aspiration and patient still consuming a modified diet.    Education:   Patient and/or family/caregiver 
  Call light within reach  In Bed  RN notified     Therapy Time:  Time In: 1446  Time Out: 1507  Minutes: 21    Sarah Wing M.S., CCC-SLP   1/6/2025 3:17 PM      
Value Ref Range    POC Glucose 364 (H) 65 - 100 mg/dL    Performed by: Jeff        Recent Labs     01/02/25  1502   COVID19 NOT DETECTED       Current Meds:  Current Facility-Administered Medications   Medication Dose Route Frequency    insulin glargine (LANTUS) injection vial 10 Units  10 Units SubCUTAneous Nightly    cefTRIAXone (ROCEPHIN) 1,000 mg in sterile water 10 mL IV syringe  1,000 mg IntraVENous Q24H    [Held by provider] dextrose 5 % solution   IntraVENous Continuous    0.9 % sodium chloride infusion   IntraVENous Continuous    phenol 1.4 % mouth spray 1 spray  1 spray Mouth/Throat Q2H PRN    miconazole (MICOTIN) 2 % cream   Topical BID    sodium chloride flush 0.9 % injection 5-40 mL  5-40 mL IntraVENous 2 times per day    sodium chloride flush 0.9 % injection 5-40 mL  5-40 mL IntraVENous PRN    0.9 % sodium chloride infusion   IntraVENous PRN    ondansetron (ZOFRAN-ODT) disintegrating tablet 4 mg  4 mg Oral Q6H PRN    Or    ondansetron (ZOFRAN) injection 4 mg  4 mg IntraVENous Q6H PRN    polyethylene glycol (GLYCOLAX) packet 17 g  17 g Oral Daily PRN    acetaminophen (TYLENOL) tablet 650 mg  650 mg Oral Q6H PRN    Or    acetaminophen (TYLENOL) suppository 650 mg  650 mg Rectal Q6H PRN    glucose chewable tablet 16 g  4 tablet Oral PRN    dextrose bolus 10% 125 mL  125 mL IntraVENous PRN    Or    dextrose bolus 10% 250 mL  250 mL IntraVENous PRN    Glucagon Emergency KIT 1 mg  1 mg SubCUTAneous PRN    dextrose 10 % infusion   IntraVENous Continuous PRN    insulin lispro (HUMALOG,ADMELOG) injection vial 0-8 Units  0-8 Units SubCUTAneous 4x Daily AC & HS    cetirizine (ZYRTEC) tablet 10 mg  10 mg Oral QHS    divalproex (DEPAKOTE) DR tablet 125 mg  125 mg Oral TID    memantine (NAMENDA) tablet 10 mg  10 mg Oral BID    mirtazapine (REMERON) tablet 7.5 mg  7.5 mg Oral Nightly       Signed:  Theresa Ordaz MD    Part of this note may have been written by using a voice dictation software.  The

## 2025-01-10 NOTE — CARE COORDINATION
Discharge planning was discussed with the primary nurse and attending MD.    The Important Message from Medicare letter was delivered and explained to the patient and daughter. The patient's daughter Natalya verbalized understanding of the information and signed the letter. The patient and daughter were given a copy of the letter and the original was placed in the chart.

## 2025-01-10 NOTE — PLAN OF CARE
Problem: Safety - Adult  Goal: Free from fall injury  Outcome: Progressing     Problem: Chronic Conditions and Co-morbidities  Goal: Patient's chronic conditions and co-morbidity symptoms are monitored and maintained or improved  Outcome: Progressing     Problem: Discharge Planning  Goal: Discharge to home or other facility with appropriate resources  Outcome: Progressing  Flowsheets (Taken 1/9/2025 1929)  Discharge to home or other facility with appropriate resources:   Identify barriers to discharge with patient and caregiver   Arrange for needed discharge resources and transportation as appropriate   Identify discharge learning needs (meds, wound care, etc)   Refer to discharge planning if patient needs post-hospital services based on physician order or complex needs related to functional status, cognitive ability or social support system     Problem: Skin/Tissue Integrity  Goal: Absence of new skin breakdown  Outcome: Progressing     Problem: ABCDS Injury Assessment  Goal: Absence of physical injury  Outcome: Progressing     Problem: Pain  Goal: Verbalizes/displays adequate comfort level or baseline comfort level  Outcome: Progressing

## 2025-01-10 NOTE — CARE COORDINATION
The attending MD confirmed the patient is discharging today.    The primary nurse can call report to [phone], room 134A.    Medical transport was arranged for today 1030. The attending MD, [facility] liaison, patient's daughter, [facility], and nursing staff were notified of the arranged transport time. The report information was given to the primary nurse. Orders and clinicals were placed in the patient's discharge packet.

## 2025-01-10 NOTE — CARE COORDINATION
01/10/25 1025   Service Assessment   Patient Orientation Alert and Oriented   Cognition Alert   History Provided By Patient   Support Systems Children   Patient's Healthcare Decision Maker is: Legal Next of Kin   Prior Functional Level Assistance with the following:   Current Functional Level Assistance with the following:   Can patient return to prior living arrangement Yes   Financial Resources Medicare;Medicaid   Community Resources None   Social/Functional History   Lives With Other (Comment)  ([facility])   Type of Home Facility  ([facility])   Bathroom Toilet Standard   Bathroom Equipment Commode   Bathroom Accessibility Accessible   Home Equipment None   Prior Level of Assist for ADLs Needs assistance   Active  No   Discharge Planning   Type of Residence Skilled Nursing Facility   Current Services Prior To Admission Skilled Nursing Facility   Potential Assistance Needed Skilled Nursing Facility   DME Ordered? No   Potential Assistance Purchasing Medications No   Type of Home Care Services None   Patient expects to be discharged to: Skilled nursing facility   Services At/After Discharge   Transition of Care Consult (CM Consult) Discharge Planning   Services At/After Discharge Skilled Nursing Facility (SNF)   Condition of Participation: Discharge Planning   The Patient and/or Patient Representative was provided with a Choice of Provider? Patient;Patient Representative   Name of the Patient Representative who was provided with the Choice of Provider and agrees with the Discharge Plan?  Natalya Ford   The Patient and/Or Patient Representative agree with the Discharge Plan? Yes   Freedom of Choice list was provided with basic dialogue that supports the patient's individualized plan of care/goals, treatment preferences, and shares the quality data associated with the providers?  Yes     The patient is discharging to [facility]. [facility] confirmed they have accepted the patient for services.
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
## 2025-02-01 PROBLEM — E86.0 DEHYDRATION: Status: RESOLVED | Noted: 2025-01-01 | Resolved: 2025-01-01

## 2025-02-02 PROBLEM — N39.0 UTI (URINARY TRACT INFECTION): Status: RESOLVED | Noted: 2025-01-01 | Resolved: 2025-01-01

## (undated) DEVICE — SYRINGE MED 10ML LUERLOCK TIP W/O SFTY DISP

## (undated) DEVICE — 7 DAY SILVER-COATED ANTIMICROBIAL BARRIER DRESSING: Brand: ACTICOAT 7  4" X 5"

## (undated) DEVICE — INTENDED FOR TISSUE SEPARATION, AND OTHER PROCEDURES THAT REQUIRE A SHARP SURGICAL BLADE TO PUNCTURE OR CUT.: Brand: BARD-PARKER SAFETY BLADES SIZE 10, STERILE

## (undated) DEVICE — SINGLE PORT MANIFOLD: Brand: NEPTUNE 2

## (undated) DEVICE — GUIDE WIRE, BALL-TIPPED, STERILE

## (undated) DEVICE — SOLUTION IRRIG 1000ML 09% SOD CHL USP PIC PLAS CONTAINER

## (undated) DEVICE — REAMER SHAFT, MOD.TRINKLE: Brand: BIXCUT

## (undated) DEVICE — GAUZE,SPONGE,4"X4",12PLY,WOVEN,NS,LF: Brand: MEDLINE

## (undated) DEVICE — LUBE JELLY FOIL PACK 1.4 OZ: Brand: MEDLINE INDUSTRIES, INC.

## (undated) DEVICE — K-WIRE

## (undated) DEVICE — ENDOSCOPIC KIT 1.1+ OP4 CA DE 2 GWN AAMI LEVEL 3

## (undated) DEVICE — CANNULA NSL ORAL AD FOR CAPNOFLEX CO2 O2 AIRLFE

## (undated) DEVICE — DRAPE SHT 3 QTR PROXIMA 53X77 --

## (undated) DEVICE — SHEET, DRAPE, SPLIT, STERILE: Brand: MEDLINE

## (undated) DEVICE — CONNECTOR TBNG OD5-7MM O2 END DISP

## (undated) DEVICE — TFN: Brand: MEDLINE INDUSTRIES, INC.

## (undated) DEVICE — AIRLIFE™ OXYGEN TUBING 7 FEET (2.1 M) CRUSH RESISTANT OXYGEN TUBING, VINYL TIPPED: Brand: AIRLIFE™

## (undated) DEVICE — KENDALL RADIOLUCENT FOAM MONITORING ELECTRODE RECTANGULAR SHAPE: Brand: KENDALL

## (undated) DEVICE — SYRINGE MEDICAL 3ML CLEAR PLASTIC STANDARD NON CONTROL LUERLOCK TIP DISPOSABLE

## (undated) DEVICE — SYRINGE, LUER SLIP, STERILE, 60ML: Brand: MEDLINE

## (undated) DEVICE — NEEDLE SYR 18GA L1.5IN RED PLAS HUB S STL BLNT FILL W/O

## (undated) DEVICE — DRILL, AO, STERILE

## (undated) DEVICE — PAD,ABDOMINAL,5"X9",ST,LF,25/BX: Brand: MEDLINE INDUSTRIES, INC.

## (undated) DEVICE — PREP SKN CHLRAPRP APL 26ML STR --

## (undated) DEVICE — DRAPE,U/SHT,SPLIT,FILM,60X84,STERILE: Brand: MEDLINE

## (undated) DEVICE — DRAPE PT ISOLATN 130 IN X 96 IN

## (undated) DEVICE — SUTURE MCRYL SZ 2-0 L27IN ABSRB UD SH L26MM TAPERPOINT NDL Y417H

## (undated) DEVICE — DRAPE C-ARMOUR C-ARM KIT --

## (undated) DEVICE — SPONGE GZ W4XL4IN COT 12 PLY TYP VII WVN C FLD DSGN

## (undated) DEVICE — 3M™ IOBAN™ 2 ANTIMICROBIAL INCISE DRAPE 6650EZ: Brand: IOBAN™ 2